# Patient Record
Sex: FEMALE | Race: BLACK OR AFRICAN AMERICAN | NOT HISPANIC OR LATINO | Employment: OTHER | URBAN - METROPOLITAN AREA
[De-identification: names, ages, dates, MRNs, and addresses within clinical notes are randomized per-mention and may not be internally consistent; named-entity substitution may affect disease eponyms.]

---

## 2017-02-14 ENCOUNTER — LAB VISIT (OUTPATIENT)
Dept: LAB | Facility: HOSPITAL | Age: 62
End: 2017-02-14
Attending: INTERNAL MEDICINE
Payer: MEDICARE

## 2017-02-14 ENCOUNTER — INITIAL CONSULT (OUTPATIENT)
Dept: HEMATOLOGY/ONCOLOGY | Facility: CLINIC | Age: 62
End: 2017-02-14
Payer: MEDICARE

## 2017-02-14 VITALS
OXYGEN SATURATION: 95 % | BODY MASS INDEX: 28.86 KG/M2 | HEIGHT: 64 IN | TEMPERATURE: 98 F | WEIGHT: 169.06 LBS | SYSTOLIC BLOOD PRESSURE: 118 MMHG | DIASTOLIC BLOOD PRESSURE: 76 MMHG | HEART RATE: 62 BPM

## 2017-02-14 DIAGNOSIS — R76.8 ELEVATED SERUM IMMUNOGLOBULIN FREE LIGHT CHAINS: Primary | ICD-10-CM

## 2017-02-14 DIAGNOSIS — R76.8 ELEVATED SERUM IMMUNOGLOBULIN FREE LIGHT CHAINS: ICD-10-CM

## 2017-02-14 LAB
ALBUMIN SERPL BCP-MCNC: 3.6 G/DL
ALP SERPL-CCNC: 117 U/L
ALT SERPL W/O P-5'-P-CCNC: 12 U/L
ANION GAP SERPL CALC-SCNC: 6 MMOL/L
AST SERPL-CCNC: 25 U/L
B2 MICROGLOB SERPL-MCNC: 1.8 UG/ML
BASOPHILS # BLD AUTO: 0.03 K/UL
BASOPHILS NFR BLD: 0.6 %
BILIRUB SERPL-MCNC: 0.3 MG/DL
BUN SERPL-MCNC: 12 MG/DL
CALCIUM SERPL-MCNC: 9.2 MG/DL
CHLORIDE SERPL-SCNC: 108 MMOL/L
CO2 SERPL-SCNC: 28 MMOL/L
CREAT SERPL-MCNC: 0.9 MG/DL
DIFFERENTIAL METHOD: NORMAL
EOSINOPHIL # BLD AUTO: 0.1 K/UL
EOSINOPHIL NFR BLD: 2.8 %
ERYTHROCYTE [DISTWIDTH] IN BLOOD BY AUTOMATED COUNT: 14 %
EST. GFR  (AFRICAN AMERICAN): >60 ML/MIN/1.73 M^2
EST. GFR  (NON AFRICAN AMERICAN): >60 ML/MIN/1.73 M^2
GLUCOSE SERPL-MCNC: 83 MG/DL
HCT VFR BLD AUTO: 40 %
HGB BLD-MCNC: 12.9 G/DL
IGA SERPL-MCNC: 352 MG/DL
IGG SERPL-MCNC: 1514 MG/DL
IGM SERPL-MCNC: 69 MG/DL
LDH SERPL L TO P-CCNC: 188 U/L
LYMPHOCYTES # BLD AUTO: 1.7 K/UL
LYMPHOCYTES NFR BLD: 34 %
MCH RBC QN AUTO: 28.2 PG
MCHC RBC AUTO-ENTMCNC: 32.3 %
MCV RBC AUTO: 88 FL
MONOCYTES # BLD AUTO: 0.6 K/UL
MONOCYTES NFR BLD: 11.8 %
NEUTROPHILS # BLD AUTO: 2.5 K/UL
NEUTROPHILS NFR BLD: 50.6 %
PLATELET # BLD AUTO: 280 K/UL
PMV BLD AUTO: 11.2 FL
POTASSIUM SERPL-SCNC: 3.9 MMOL/L
PROT SERPL-MCNC: 7.7 G/DL
RBC # BLD AUTO: 4.57 M/UL
SODIUM SERPL-SCNC: 142 MMOL/L
WBC # BLD AUTO: 5 K/UL

## 2017-02-14 PROCEDURE — 80053 COMPREHEN METABOLIC PANEL: CPT

## 2017-02-14 PROCEDURE — 82784 ASSAY IGA/IGD/IGG/IGM EACH: CPT | Mod: 59

## 2017-02-14 PROCEDURE — 84165 PROTEIN E-PHORESIS SERUM: CPT

## 2017-02-14 PROCEDURE — 83615 LACTATE (LD) (LDH) ENZYME: CPT

## 2017-02-14 PROCEDURE — 85025 COMPLETE CBC W/AUTO DIFF WBC: CPT

## 2017-02-14 PROCEDURE — 36415 COLL VENOUS BLD VENIPUNCTURE: CPT

## 2017-02-14 PROCEDURE — 84165 PROTEIN E-PHORESIS SERUM: CPT | Mod: 26,,, | Performed by: PATHOLOGY

## 2017-02-14 PROCEDURE — 82232 ASSAY OF BETA-2 PROTEIN: CPT

## 2017-02-14 PROCEDURE — 83520 IMMUNOASSAY QUANT NOS NONAB: CPT

## 2017-02-14 PROCEDURE — 99204 OFFICE O/P NEW MOD 45 MIN: CPT | Mod: S$GLB,,, | Performed by: INTERNAL MEDICINE

## 2017-02-14 PROCEDURE — 99999 PR PBB SHADOW E&M-EST. PATIENT-LVL III: CPT | Mod: PBBFAC,,, | Performed by: INTERNAL MEDICINE

## 2017-02-14 NOTE — LETTER
February 15, 2017      Ervin Carlson MD  [address]  [place] [address]           [facility]  [address] [place] [address]  Phone: [phone]          Patient: Bossman Kumari   MR Number: [id number]   YOB: 1955   Date of Visit: 2/14/2017       Dear Dr. Ervin Carlson:    Thank you for referring Bossman Kumari to me for evaluation. Attached you will find relevant portions of my assessment and plan of care.    If you have questions, please do not hesitate to call me. I look forward to following Bossman Kumari along with you.    Sincerely,    Colette Madsen MD    Enclosure  CC:  No Recipients    If you would like to receive this communication electronically, please contact [email or web address] or [phone] to request more information on [employer] Link access.    For providers and/or their staff who would like to refer a patient to [facility], please contact us through our one-stop-shop provider referral line, [facility] , at [phone].    If you feel you have received this communication in error or would no longer like to receive these types of communications, please e-mail [email or web address]

## 2017-02-14 NOTE — MR AVS SNAPSHOT
[facility]  [address] [facility] [address] [place] [address]  Phone: [phone]                  Bossman Kumari   2017 8:00 AM   Initial consult    Description:  Female : 1955   Provider:  Colette Madsen MD   [facility]  [facility]           Reason for Visit     Consult           Diagnoses this Visit        Comments    Elevated serum immunoglobulin free light chains    -  Primary            To Do List           Future Appointments        Provider Department Dept Phone    2017 10:25 AM LAB, [facility] [phone]    3/15/2017 9:00 AM Colette Madsen MD [facility] [phone]      Goals (5 Years of Data)     None      Follow-Up and Disposition     Return in about 1 month (around 3/14/2017).      [facility]     [facility] -  Assistance  Registered nurses in the [facility] provide clinical advisement, health education, appointment booking, and other advisory services.  Call for this free service at [phone].             Medications           Message regarding Medications     Verify the changes and/or additions to your medication regime listed below are the same as discussed with your clinician today.  If any of these changes or additions are incorrect, please notify your healthcare provider.             Verify that the below list of medications is an accurate representation of the medications you are currently taking.  If none reported, the list may be blank. If incorrect, please contact your healthcare provider. Carry this list with you in case of emergency.           Current Medications     albuterol (PROAIR HFA) 90 mcg/actuation inhaler Inhale 2 puffs into the lungs every 6 (six) hours as needed.    cyanocobalamin (VITAMIN B-12) 1000 MCG tablet 1 a day    etodolac (LODINE) 400 MG tablet 1 twice a day if needed. Take with food.  Stop if upsets stomach  "   fluticasone (FLONASE) 50 mcg/actuation nasal spray 1 spray by Each Nare route once daily.    methocarbamol (ROBAXIN) 500 MG Tab 1 qid, prn neck pain    montelukast (SINGULAIR) 10 mg tablet Take 1 tablet (10 mg total) by mouth every evening.    pantoprazole (PROTONIX) 20 MG tablet Take 1 tablet (20 mg total) by mouth once daily.    tramadol (ULTRAM) 50 mg tablet 1 tid, prn, pain    trazodone (DESYREL) 100 MG tablet TK 1 TO 1 AND 1/2 TS PO QD AFTER MEALS AND HS PRN           Clinical Reference Information           Your Vitals Were     BP Pulse Temp Height Weight SpO2    118/76 (BP Location: Right arm, Patient Position: Sitting, BP Method: Manual) 62 98 °F (36.7 °C) (Oral) 5' 4" (1.626 m) 76.7 kg (169 lb 1.5 oz) 95%    BMI                29.02 kg/m2          Blood Pressure          Most Recent Value    BP  118/76      Allergies as of 2/14/2017     No Known Allergies      Immunizations Administered on Date of Encounter - 2/14/2017     None      Orders Placed During Today's Visit     Future Labs/Procedures Expected by Expires    Beta 2 Microglobulin, Serum  2/14/2017 2/14/2018    CBC auto differential  2/14/2017 2/14/2018    Comprehensive metabolic panel  2/14/2017 2/14/2018    Immunoglobulin free LT chains blood  2/14/2017 2/14/2018    Immunoglobulins (IgG, IgA, IgM) Quantitative  2/14/2017 2/14/2018    Lactate dehydrogenase  2/14/2017 2/14/2018    Protein electrophoresis, serum  2/14/2017 2/14/2018    Immunofixation, 24 hour Urine  As directed 2/14/2018    Protein electrophoresis, urine  As directed 2/15/2018      Language Assistance Services     ATTENTION: Language assistance services are available, free of charge. Please call [phone].      ATENCIÓN: Si archiela giovani, tiene a pa disposición servicios gratuitos de asistencia lingüística. Llame al [phone].     CHÚ Ý: N?u b?n nói Ti?ng Vi?t, có các d?ch v? h? tr? ngôn ng? mi?n phí dành cho b?n. G?i s? [phone].         [facility] complies with applicable Federal civil rights laws and does not discriminate on the basis of race, color, national origin, age, disability, or sex.

## 2017-02-14 NOTE — PROGRESS NOTES
Subjective:       Patient ID: Bossman Kumari is a 61 y.o. female.    Chief Complaint: Consult (Abnormal lab results)    HPI   PT ARRIVED 30 MIN LATE FOR VISIT AND STILL SEEN    REASON FOR REFERRAL : ABNORMAL LABORATORY TEST  REFERRING PHYSICIAN: JONELLE CARRILLO MD    HISTORY OF PRESENT ILLNESS:  The patient arrived 30 minutes late for visit and still seen.  The patient is a 61-year-old female with past medical history of GERD, osteoarthritis and  asthma seen today for abnormal laboratory testing.The patient frequently answering cell phone calls during visit.  She has chronic low back pain related to degenerative joint disease of the lumbar spine.  She also has chronic left foot pain related to plantar fasciitis.  She reports intermittent tingling in her feet for which she underwent further evaluation including SPEP testing, which was normal.  A kappa and free light chain level mildly elevated at 3.28 mg/dL with normal lambda free light chains of 1.99 mg/dL with a normal kappa/lambda free light chain ratio of 1.65. No history of anemia. Family history remarkable  for mother diagnosed with liver cancer.  No family history of amyloidosis.  No history of CKD.Her appetite and weight are stable.  No fevers or night sweats.  She is here for further evaluation.    PAST MEDICAL HISTORY:  Cognitive impairment, asthma, chronic, uncomplicated, DDD lumbar, plantar fasciitis, chronic back pain.    PAST SURGICAL HISTORY:  Total abdominal hysterectomy.        FAMILY HISTORY ; MOM  LIVER CANCER AT 83 Y/O. FATHER LIVING HAS HTN, DM.  6 SISTERS, 2 BROTHERS,     SOCIAL HISTORY ; NO TOBACCO . NO ETOH. . 4 CHILDREN. RETIRED AT [employer]  AS [employer] . . POSSIBLE ASBESTOS EXPOSURE    Review of Systems   Constitutional: Negative for appetite change, fatigue, fever and unexpected weight change.   HENT: Negative for mouth sores and nosebleeds.    Eyes: Negative for visual disturbance.   Respiratory:  "Negative for cough and shortness of breath.    Cardiovascular: Negative for chest pain and leg swelling.   Gastrointestinal: Negative for abdominal pain, blood in stool, constipation, diarrhea, nausea and vomiting.   Genitourinary: Negative for frequency and hematuria.   Musculoskeletal: Positive for arthralgias and back pain.   Skin: Negative for rash.   Neurological: Negative for dizziness, seizures, weakness, light-headedness, numbness and headaches.   Hematological: Negative for adenopathy.   Psychiatric/Behavioral: The patient is not nervous/anxious.        Objective:       Vitals:    02/14/17 0842   BP: 118/76   BP Location: Right arm   Patient Position: Sitting   BP Method: Manual   Pulse: 62   Temp: 98 °F (36.7 °C)   TempSrc: Oral   SpO2: 95%   Weight: 76.7 kg (169 lb 1.5 oz)   Height: 5' 4" (1.626 m)       Physical Exam   Constitutional: She is oriented to person, place, and time. She appears well-developed and well-nourished.   HENT:   Head: Normocephalic.   Mouth/Throat: Oropharynx is clear and moist. No oropharyngeal exudate.   Eyes: Conjunctivae and lids are normal. Pupils are equal, round, and reactive to light. No scleral icterus.   Neck: Normal range of motion. Neck supple. No thyromegaly present.   Cardiovascular: Normal rate, regular rhythm and normal heart sounds.    No murmur heard.  Pulmonary/Chest: Breath sounds normal. She has no wheezes. She has no rales.   Abdominal: Soft. Bowel sounds are normal. She exhibits no distension and no mass. There is no hepatosplenomegaly. There is no tenderness. There is no rebound and no guarding.   Musculoskeletal: Normal range of motion. She exhibits no edema or tenderness.   Lymphadenopathy:     She has no cervical adenopathy.     She has no axillary adenopathy.        Right: No supraclavicular adenopathy present.        Left: No supraclavicular adenopathy present.   Neurological: She is alert and oriented to person, place, and time. No cranial nerve " deficit. Coordination normal.   Skin: Skin is warm and dry. No ecchymosis, no petechiae and no rash noted. No erythema.   Psychiatric: She has a normal mood and affect.           Results for VANDANA MATTHEWS (MRN [id number]) as of 2/14/2017 09:04   Ref. Range 10/21/2016 11:30   [place] Free Light Chains Latest Ref Range: 0.33 - 1.94 mg/dL 3.28 (H)   Lambda Free Light Chains Latest Ref Range: 0.57 - 2.63 mg/dL 1.99   Kappa/Lambda FLC Ratio Latest Ref Range: 0.26 - 1.65  1.65     SPEP-Normal total protein.   No paraprotein bands are detected    Lab Results   Component Value Date    WBC 5.00 02/14/2017    HGB 12.9 02/14/2017    HCT 40.0 02/14/2017    MCV 88 02/14/2017     02/14/2017     Results for VANADNA MATTHEWS (MRN [id number]) as of 2/14/2017 09:18   Ref. Range 10/21/2016 11:30   Sodium Latest Ref Range: 136 - 145 mmol/L 142   Potassium Latest Ref Range: 3.5 - 5.1 mmol/L 3.6   Chloride Latest Ref Range: 95 - 110 mmol/L 108   CO2 Latest Ref Range: 23 - 29 mmol/L 26   Anion Gap Latest Ref Range: 8 - 16 mmol/L 8   BUN, Bld Latest Ref Range: 6 - 20 mg/dL 15   Creatinine Latest Ref Range: 0.5 - 1.4 mg/dL 0.9   eGFR if non African American Latest Ref Range: >60 mL/min/1.73 m^2 >60.0   eGFR if African American Latest Ref Range: >60 mL/min/1.73 m^2 >60.0   Glucose Latest Ref Range: 70 - 110 mg/dL 52 (L)   Calcium Latest Ref Range: 8.7 - 10.5 mg/dL 9.7   Alkaline Phosphatase Latest Ref Range: 55 - 135 U/L 145 (H)   Total Protein Latest Ref Range: 6.0 - 8.4 g/dL 8.0   Protein, Serum Latest Ref Range: 6.0 - 8.4 g/dL 7.4   Albumin Latest Ref Range: 3.5 - 5.2 g/dL 3.8   Total Bilirubin Latest Ref Range: 0.1 - 1.0 mg/dL 0.3   AST Latest Ref Range: 10 - 40 U/L 26   ALT Latest Ref Range: 10 - 44 U/L 22     MRI Lumbar Spine 5/29/2015   1. No evidence of a compression fracture involving the lumbar spine.    #2.  Multilevel degenerative change of the lumbar spine most significant at L4-L5 and L5-S1 as detailed above.    #3.   Probable cholelithiasis.    Assessment:       1. Elevated serum immunoglobulin free light chains        Plan:    In summary, the patient is a 61-year-old with a history of GERD and DDD with MLUS or  monoclonal light chain of undetermined significance.  Renal function is within normal.  CBC reveals normal blood counts.  The patient does have some minor tingling in her feet. Plan  testing today including SPEP, free light chains, quantitative immunoglobulins, beta-2 microglobulin and urine studies.  She will follow up in 1 month to review the results of the workup.  All questions posed were answered to the patient's satisfaction.      Thank you for allowing me to participate in the care of your patient.     CC: Ervin Carlson M.D.

## 2017-02-15 LAB
ALBUMIN SERPL ELPH-MCNC: 3.93 G/DL
ALPHA1 GLOB SERPL ELPH-MCNC: 0.32 G/DL
ALPHA2 GLOB SERPL ELPH-MCNC: 0.79 G/DL
B-GLOBULIN SERPL ELPH-MCNC: 1.13 G/DL
GAMMA GLOB SERPL ELPH-MCNC: 1.52 G/DL
KAPPA LC SER QL IA: 2.97 MG/DL
KAPPA LC/LAMBDA SER IA: 1.19
LAMBDA LC SER QL IA: 2.49 MG/DL
PROT SERPL-MCNC: 7.7 G/DL

## 2017-02-16 DIAGNOSIS — Z12.31 ENCOUNTER FOR SCREENING MAMMOGRAM FOR BREAST CANCER: Primary | ICD-10-CM

## 2017-02-17 DIAGNOSIS — J45.30 ASTHMA, CHRONIC, MILD PERSISTENT, UNCOMPLICATED: ICD-10-CM

## 2017-02-17 LAB — PATHOLOGIST INTERPRETATION SPE: NORMAL

## 2017-02-17 RX ORDER — ALBUTEROL SULFATE 90 UG/1
2 AEROSOL, METERED RESPIRATORY (INHALATION) EVERY 6 HOURS PRN
Qty: 3 INHALER | Refills: 5 | Status: SHIPPED | OUTPATIENT
Start: 2017-02-17 | End: 2017-04-06 | Stop reason: SDUPTHER

## 2017-02-17 NOTE — TELEPHONE ENCOUNTER
----- Message from Kashmri Austin sent at 2/17/2017  1:50 PM CST -----  Contact: Self  REFILL: albuterol (PROAIR HFA) 90 mcg/actuation inhaler

## 2017-03-15 ENCOUNTER — OFFICE VISIT (OUTPATIENT)
Dept: HEMATOLOGY/ONCOLOGY | Facility: CLINIC | Age: 62
End: 2017-03-15
Payer: MEDICARE

## 2017-03-15 ENCOUNTER — HOSPITAL ENCOUNTER (EMERGENCY)
Facility: HOSPITAL | Age: 62
Discharge: HOME | End: 2017-03-15
Attending: EMERGENCY MEDICINE
Payer: MEDICARE

## 2017-03-15 VITALS
DIASTOLIC BLOOD PRESSURE: 72 MMHG | HEIGHT: 64 IN | OXYGEN SATURATION: 98 % | HEART RATE: 81 BPM | RESPIRATION RATE: 18 BRPM | WEIGHT: 169 LBS | SYSTOLIC BLOOD PRESSURE: 113 MMHG | TEMPERATURE: 98 F | BODY MASS INDEX: 28.85 KG/M2

## 2017-03-15 VITALS
HEART RATE: 88 BPM | HEIGHT: 64 IN | OXYGEN SATURATION: 99 % | BODY MASS INDEX: 29.2 KG/M2 | WEIGHT: 171.06 LBS | TEMPERATURE: 98 F | DIASTOLIC BLOOD PRESSURE: 64 MMHG | SYSTOLIC BLOOD PRESSURE: 118 MMHG

## 2017-03-15 DIAGNOSIS — R76.8 ELEVATED SERUM IMMUNOGLOBULIN FREE LIGHT CHAINS: Primary | ICD-10-CM

## 2017-03-15 DIAGNOSIS — R53.1 WEAKNESS GENERALIZED: Primary | ICD-10-CM

## 2017-03-15 DIAGNOSIS — R42 DIZZINESS: ICD-10-CM

## 2017-03-15 DIAGNOSIS — R53.1 WEAKNESS: ICD-10-CM

## 2017-03-15 LAB
ALBUMIN SERPL BCP-MCNC: 3.6 G/DL
ALP SERPL-CCNC: 119 U/L
ALT SERPL W/O P-5'-P-CCNC: 31 U/L
ANION GAP SERPL CALC-SCNC: 8 MMOL/L
AST SERPL-CCNC: 36 U/L
BASOPHILS # BLD AUTO: 0.04 K/UL
BASOPHILS NFR BLD: 0.8 %
BILIRUB SERPL-MCNC: 0.2 MG/DL
BILIRUB UR QL STRIP: NEGATIVE
BUN SERPL-MCNC: 13 MG/DL
CALCIUM SERPL-MCNC: 9.6 MG/DL
CHLORIDE SERPL-SCNC: 111 MMOL/L
CLARITY UR: CLEAR
CO2 SERPL-SCNC: 24 MMOL/L
COLOR UR: YELLOW
CREAT SERPL-MCNC: 0.9 MG/DL
DIFFERENTIAL METHOD: NORMAL
EOSINOPHIL # BLD AUTO: 0.3 K/UL
EOSINOPHIL NFR BLD: 5.1 %
ERYTHROCYTE [DISTWIDTH] IN BLOOD BY AUTOMATED COUNT: 14.2 %
EST. GFR  (AFRICAN AMERICAN): >60 ML/MIN/1.73 M^2
EST. GFR  (NON AFRICAN AMERICAN): >60 ML/MIN/1.73 M^2
GLUCOSE SERPL-MCNC: 66 MG/DL
GLUCOSE UR QL STRIP: NEGATIVE
HCT VFR BLD AUTO: 38.1 %
HGB BLD-MCNC: 12.4 G/DL
HGB UR QL STRIP: NEGATIVE
KETONES UR QL STRIP: NEGATIVE
LEUKOCYTE ESTERASE UR QL STRIP: ABNORMAL
LYMPHOCYTES # BLD AUTO: 1.8 K/UL
LYMPHOCYTES NFR BLD: 36.9 %
MAGNESIUM SERPL-MCNC: 2.2 MG/DL
MCH RBC QN AUTO: 28.6 PG
MCHC RBC AUTO-ENTMCNC: 32.5 %
MCV RBC AUTO: 88 FL
MICROSCOPIC COMMENT: NORMAL
MONOCYTES # BLD AUTO: 0.7 K/UL
MONOCYTES NFR BLD: 13.8 %
NEUTROPHILS # BLD AUTO: 2.1 K/UL
NEUTROPHILS NFR BLD: 43.2 %
NITRITE UR QL STRIP: NEGATIVE
PH UR STRIP: 6 [PH] (ref 5–8)
PLATELET # BLD AUTO: 265 K/UL
PMV BLD AUTO: 12.1 FL
POCT GLUCOSE: 101 MG/DL (ref 70–110)
POTASSIUM SERPL-SCNC: 4.1 MMOL/L
PROT SERPL-MCNC: 7.7 G/DL
PROT UR QL STRIP: NEGATIVE
RBC # BLD AUTO: 4.34 M/UL
SODIUM SERPL-SCNC: 143 MMOL/L
SP GR UR STRIP: 1.01 (ref 1–1.03)
T4 FREE SERPL-MCNC: 0.81 NG/DL
TSH SERPL DL<=0.005 MIU/L-ACNC: 0.07 UIU/ML
URN SPEC COLLECT METH UR: ABNORMAL
UROBILINOGEN UR STRIP-ACNC: NEGATIVE EU/DL
WBC # BLD AUTO: 4.91 K/UL
WBC #/AREA URNS HPF: 2 /HPF (ref 0–5)

## 2017-03-15 PROCEDURE — 80053 COMPREHEN METABOLIC PANEL: CPT

## 2017-03-15 PROCEDURE — 93005 ELECTROCARDIOGRAM TRACING: CPT

## 2017-03-15 PROCEDURE — 82962 GLUCOSE BLOOD TEST: CPT

## 2017-03-15 PROCEDURE — 96361 HYDRATE IV INFUSION ADD-ON: CPT

## 2017-03-15 PROCEDURE — 84443 ASSAY THYROID STIM HORMONE: CPT

## 2017-03-15 PROCEDURE — 99213 OFFICE O/P EST LOW 20 MIN: CPT | Mod: S$GLB,,, | Performed by: INTERNAL MEDICINE

## 2017-03-15 PROCEDURE — 81000 URINALYSIS NONAUTO W/SCOPE: CPT

## 2017-03-15 PROCEDURE — 25000003 PHARM REV CODE 250: Performed by: EMERGENCY MEDICINE

## 2017-03-15 PROCEDURE — 1160F RVW MEDS BY RX/DR IN RCRD: CPT | Mod: S$GLB,,, | Performed by: INTERNAL MEDICINE

## 2017-03-15 PROCEDURE — 99284 EMERGENCY DEPT VISIT MOD MDM: CPT | Mod: 25

## 2017-03-15 PROCEDURE — 84439 ASSAY OF FREE THYROXINE: CPT

## 2017-03-15 PROCEDURE — 96360 HYDRATION IV INFUSION INIT: CPT

## 2017-03-15 PROCEDURE — 83735 ASSAY OF MAGNESIUM: CPT

## 2017-03-15 PROCEDURE — 85025 COMPLETE CBC W/AUTO DIFF WBC: CPT

## 2017-03-15 PROCEDURE — 99999 PR PBB SHADOW E&M-EST. PATIENT-LVL III: CPT | Mod: PBBFAC,,, | Performed by: INTERNAL MEDICINE

## 2017-03-15 RX ADMIN — SODIUM CHLORIDE 1000 ML: 0.9 INJECTION, SOLUTION INTRAVENOUS at 12:03

## 2017-03-15 NOTE — DISCHARGE INSTRUCTIONS
Please return immediately if you get worse or if new problems develop.  Please make an appointment to see your primary care doctor this week.  Rest.  Lots of liquids.  Small frequent meals.  Rest.

## 2017-03-15 NOTE — PROGRESS NOTES
"Subjective:       Patient ID: Bossman Kumari is a 61 y.o. female.    Chief Complaint: Follow-up    HPI       Follow-up for: elevated serum FLC      HISTORY OF PRESENT ILLNESS: The patient is a 61-year-old female with past medical history of GERD, osteoarthritis and  asthma seen today for f/u abnormal laboratory testing.T She has chronic low back pain related to degenerative joint disease of the lumbar spine.  She also has chronic left foot pain related to plantar fasciitis.  She reports intermittent tingling in her feet for which she underwent further evaluation including SPEP testing, which was normal.  A kappa and free light chain level mildly elevated at 3.28 mg/dL with normal lambda free light chains of 1.99 mg/dL with a normal kappa/lambda free light chain ratio of 1.65. No history of anemia. Family history remarkable  for mother diagnosed with liver cancer.  No family history of amyloidosis.  No history of CKD.      Today, she reports she "feels bad"  She reports she has dizziness and generalized weakness 2 days ago  She reports she " can't think"   No syncopal episode  Her appetite and weight are stable.    No fevers or night sweats.    No SOB/CP/N/V  No diarrhea or rashes  No pain     Pt again answering phone calls during visit  Pt reports " son in [facility]"     She is process of establishing care with new PCP    She did not undergo follow-up blood work or urine studies  She did not undergo surveillance Mammo       PAST MEDICAL HISTORY:  Cognitive impairment, asthma, chronic, uncomplicated, DDD lumbar, plantar fasciitis, chronic back pain.    PAST SURGICAL HISTORY:  Total abdominal hysterectomy.        FAMILY HISTORY ; MOM  LIVER CANCER AT 85 Y/O. FATHER LIVING HAS HTN, DM.  6 SISTERS, 2 BROTHERS,     SOCIAL HISTORY ; NO TOBACCO . NO ETOH. . 4 CHILDREN. RETIRED AT [employer]  AS [employer] . . POSSIBLE ASBESTOS EXPOSURE    Review of Systems   Constitutional: Positive for fatigue. " "Negative for appetite change, fever and unexpected weight change.   HENT: Negative for mouth sores and nosebleeds.    Eyes: Negative for visual disturbance.   Respiratory: Negative for cough and shortness of breath.    Cardiovascular: Negative for chest pain and leg swelling.   Gastrointestinal: Negative for abdominal pain, blood in stool, constipation, diarrhea, nausea and vomiting.   Genitourinary: Negative for frequency and hematuria.   Musculoskeletal: Positive for arthralgias and back pain.   Skin: Negative for rash.   Neurological: Positive for dizziness and weakness. Negative for seizures, light-headedness, numbness and headaches.   Hematological: Negative for adenopathy.   Psychiatric/Behavioral: The patient is not nervous/anxious.        Objective:       Vitals:    03/15/17 0912   BP: 118/64   BP Location: Right arm   Patient Position: Sitting   BP Method: Manual   Pulse: 88   Temp: 97.8 °F (36.6 °C)   TempSrc: Oral   SpO2: 99%   Weight: 77.6 kg (171 lb 1.2 oz)   Height: 5' 4" (1.626 m)       Physical Exam   Constitutional: She is oriented to person, place, and time. She appears well-developed and well-nourished.   HENT:   Head: Normocephalic.   Mouth/Throat: Oropharynx is clear and moist. No oropharyngeal exudate.   Eyes: Conjunctivae and lids are normal. Pupils are equal, round, and reactive to light. No scleral icterus.   Neck: Normal range of motion. Neck supple. No thyromegaly present.   Cardiovascular: Normal rate, regular rhythm and normal heart sounds.    No murmur heard.  Pulmonary/Chest: Breath sounds normal. She has no wheezes. She has no rales.   Abdominal: Soft. Bowel sounds are normal. She exhibits no distension and no mass. There is no hepatosplenomegaly. There is no tenderness. There is no rebound and no guarding.   Musculoskeletal: Normal range of motion. She exhibits no edema or tenderness.   Lymphadenopathy:     She has no cervical adenopathy.     She has no axillary adenopathy.        " Right: No supraclavicular adenopathy present.        Left: No supraclavicular adenopathy present.   Neurological: She is alert and oriented to person, place, and time. No cranial nerve deficit. Coordination normal.   Skin: Skin is warm and dry. No ecchymosis, no petechiae and no rash noted. No erythema.   Psychiatric: She has a normal mood and affect.         Results for VANDANA MATTHEWS (MRN [id number]) as of 3/15/2017 09:26   Ref. Range 10/21/2016 11:30 2/14/2017 10:07   Protein, Serum Latest Ref Range: 6.0 - 8.4 g/dL 7.4 7.7   Albumin grams/dl Latest Ref Range: 3.35 - 5.55 g/dL 3.74 3.93   Alpha-1 grams/dl Latest Ref Range: 0.17 - 0.41 g/dL 0.32 0.32   Alpha-2 grams/dl Latest Ref Range: 0.43 - 0.99 g/dL 0.71 0.79   Beta grams/dl Latest Ref Range: 0.50 - 1.10 g/dL 1.17 (H) 1.13 (H)   Gamma grams/dl Latest Ref Range: 0.67 - 1.58 g/dL 1.46 1.52   Pathologist Interpretation SPE Unknown REVIEWED REVIEWED   [place] Free Light Chains Latest Ref Range: 0.33 - 1.94 mg/dL 3.28 (H) 2.97 (H)   Lambda Free Light Chains Latest Ref Range: 0.57 - 2.63 mg/dL 1.99 2.49   Kappa/Lambda FLC Ratio Latest Ref Range: 0.26 - 1.65  1.65 1.19         SPEP-Normal total protein.   No paraprotein bands are detected    Lab Results   Component Value Date    WBC 5.00 02/14/2017    HGB 12.9 02/14/2017    HCT 40.0 02/14/2017    MCV 88 02/14/2017     02/14/2017         MRI Lumbar Spine 5/29/2015   1. No evidence of a compression fracture involving the lumbar spine.    #2.  Multilevel degenerative change of the lumbar spine most significant at L4-L5 and L5-S1 as detailed above.    #3.  Probable cholelithiasis.    Assessment:       1. Elevated serum immunoglobulin free light chains    2. Dizziness    3. Weakness        Plan:   61-year-old with a history of GERD and DDD with MLUS or  monoclonal light chain of undetermined significance.   No labs done   Urine studies not done  Referral to ED   F/u 6 mos with cbc,cmp, SPEP, FLC prior to f/u      CC:  Ervin Carlson M.D.

## 2017-03-15 NOTE — ED NOTES
Dr. Melendez notified of patients blood glucose level 66, per Dr. Melendez provide pt with 4oz juice and meal tray. Juice and food were given to patient. Will reassess blood glucose.

## 2017-03-15 NOTE — ED TRIAGE NOTES
Pt arrives to ED via transportation with c/o generalized weakness, fatigue, dizziness and nausea x couple days. Denies chest pain, SOB, or any other symptoms at this time.

## 2017-03-15 NOTE — ED AVS SNAPSHOT
[facility]  [address] [place] [address]               Bossman Kumari   3/15/2017 10:27 AM   ED    Description:  Female : 1955   Department:  [facility]           Your Care was Coordinated By:     Provider Role From To    Julio C Melendez MD Attending Provider 03/15/17 1029 --      Reason for Visit     General Weakness           Diagnoses this Visit        Comments    Weakness generalized    -  Primary     Dizziness           ED Disposition     None           To Do List           Follow-up Information     Follow up with Paula Martinez MD In 3 days.    Specialty:  Family Medicine    Contact information:    [address] [place] [address]  [phone]        [facility]     [facility] Care [facility] -  Assistance  Registered nurses in the [facility] provide clinical advisement, health education, appointment booking, and other advisory services.  Call for this free service at [phone].             Medications           Message regarding Medications     Verify the changes and/or additions to your medication regime listed below are the same as discussed with your clinician today.  If any of these changes or additions are incorrect, please notify your healthcare provider.        These medications were administered today        Dose Freq    sodium chloride 0.9% bolus 1,000 mL 1,000 mL ED 1 Time    Sig: Inject 1,000 mLs into the vein ED 1 Time.    Class: Normal    Route: Intravenous           Verify that the below list of medications is an accurate representation of the medications you are currently taking.  If none reported, the list may be blank. If incorrect, please contact your healthcare provider. Carry this list with you in case of emergency.           Current Medications     albuterol (PROAIR HFA) 90 mcg/actuation inhaler Inhale 2 puffs into the lungs every 6 (six) hours as needed.    cyanocobalamin  "(VITAMIN B-12) 1000 MCG tablet 1 a day    etodolac (LODINE) 400 MG tablet 1 twice a day if needed. Take with food.  Stop if upsets stomach    fluticasone (FLONASE) 50 mcg/actuation nasal spray 1 spray by Each Nare route once daily.    methocarbamol (ROBAXIN) 500 MG Tab 1 qid, prn neck pain    montelukast (SINGULAIR) 10 mg tablet Take 1 tablet (10 mg total) by mouth every evening.    pantoprazole (PROTONIX) 20 MG tablet Take 1 tablet (20 mg total) by mouth once daily.    tramadol (ULTRAM) 50 mg tablet 1 tid, prn, pain    trazodone (DESYREL) 100 MG tablet TK 1 TO 1 AND 1/2 TS PO QD AFTER MEALS AND HS PRN           Clinical Reference Information           Your Vitals Were     BP Pulse Temp Resp Height Weight    136/72 78 97.7 °F (36.5 °C) (Oral) 18 5' 4" (1.626 m) 76.7 kg (169 lb)    SpO2 BMI             100% 29.01 kg/m2         Allergies as of 3/15/2017     No Known Allergies      Immunizations Administered on Date of Encounter - 3/15/2017     None      ED Micro, Lab, POCT     Start Ordered       Status Ordering Provider    03/15/17 1324 03/15/17 1324  POCT glucose  Once      Final result     03/15/17 1217 03/15/17 1216  Comprehensive metabolic panel  STAT      Final result     03/15/17 1217 03/15/17 1216  CBC auto differential  STAT      Final result     03/15/17 1217 03/15/17 1216  Magnesium  STAT      Final result     03/15/17 1217 03/15/17 1216  TSH  STAT      Final result     03/15/17 1217 03/15/17 1216  Urinalysis  STAT      Final result     03/15/17 1216 03/15/17 1216  Urinalysis Microscopic  Once      Final result     03/15/17 1216 03/15/17 1216  T4, free  Once      Final result       ED Imaging Orders     None        Discharge Instructions       Please return immediately if you get worse or if new problems develop.  Please make an appointment to see your primary care doctor this week.  Rest.  Lots of liquids.  Small frequent meals.  Rest.     [facility] complies with applicable Federal " civil rights laws and does not discriminate on the basis of race, color, national origin, age, disability, or sex.        Language Assistance Services     ATTENTION: Language assistance services are available, free of charge. Please call [phone].      ATENCIÓN: Si habla giovani, tiene a pa disposición servicios gratuitos de asistencia lingüística. Llame al [phone].     CHÚ Ý: N?u b?n nói Ti?ng Vi?t, có các d?ch v? h? tr? ngôn ng? mi?n phí dành cho b?n. G?i s? [phone].

## 2017-03-15 NOTE — ED PROVIDER NOTES
"Encounter Date: 3/15/2017    SCRIBE #1 NOTE: I, Jose Julio, am scribing for, and in the presence of, Julio C Melendez MD. Other sections scribed: HPI, ROS.       History     Chief Complaint   Patient presents with    General Weakness     pta was at Dr. Perkins's office and started feeling dizzy; states she has been feeling nauseated and felt generally weak x 2 days; denies dizziness at the moment     Review of patient's allergies indicates:  No Known Allergies  HPI Comments: CC: Weakness, Dizziness  HPI: This 61 y.o. female with asthma, GERD, DDD, chronic bronchitis and Hx of TOBIN, cholecystectomy presents to the ED c/o generalized weakness, fatigue, nausea, subjective fevers, increased appetite, and episodes of dizziness for the past 3 days. Pt describes the dizziness as feeling "like I'm drunk". Pt states that she became SOB and diaphoretic during an episode of dizziness last night; she denies any episodes of dizziness since then. Pt states that she has been hydrating more than usual since onset, and it seems to have helped her dizziness. Pt also reports "a weird feeling inside, maybe it's like a nerve thing". She reports stinging pain in her feet 2 nights ago. Pt reports moderate bilateral eye pain yesterday. Sx are acute. Pt states that she was seen by Dr. Madsen for appointment this morning, and was referred here for evaluation. She denies any V/D, cough, abdominal pain, chest pain, HA, syncope.        The history is provided by the patient.     Past Medical History:   Diagnosis Date    Acute cholecystitis 12/10/2015    Cholelithiasis 12/10/2015    DDD (degenerative disc disease), lumbar     Family history of diabetes mellitus type II 11/11/2015    GERD (gastroesophageal reflux disease)     Unspecified asthma, with exacerbation     Vaginal delivery     x4     Past Surgical History:   Procedure Laterality Date    TOTAL ABDOMINAL HYSTERECTOMY       Family History   Problem Relation Age of Onset    " Hypertension Mother     Diabetes Mother     Hypertension Father      Social History   Substance Use Topics    Smoking status: Never Smoker    Smokeless tobacco: Never Used    Alcohol use No     Review of Systems   Constitutional: Positive for appetite change (increased), diaphoresis (brief episode last night, resolved), fatigue and fever (subjective).   HENT: Negative for ear pain, rhinorrhea and sore throat.    Eyes: Positive for pain (bilateral 2 days ago, resolved). Negative for visual disturbance.   Respiratory: Positive for shortness of breath (brief episode last night, resolved). Negative for cough.    Cardiovascular: Negative for chest pain.   Gastrointestinal: Positive for nausea. Negative for abdominal pain, diarrhea and vomiting.   Genitourinary: Negative for dysuria and flank pain.   Musculoskeletal: Negative for arthralgias, back pain, myalgias and neck pain.        (+) bilateral foot pain 2 days ago, resolved   Skin: Negative for rash and wound.   Neurological: Positive for weakness (generalized). Negative for syncope and headaches.       Physical Exam   Initial Vitals   BP Pulse Resp Temp SpO2   03/15/17 1011 03/15/17 1011 03/15/17 1011 03/15/17 1011 03/15/17 1011   105/61 71 20 97.7 °F (36.5 °C) 99 %     Physical Exam  The patient was examined specifically for the following:   General:No significant distress, Good color, Warm and dry. Head and neck:Scalp atraumatic, Neck supple. Neurological:Appropriate conversation, Gross motor deficits. Eyes:Conjugate gaze, Clear corneas. ENT: No epistaxis. Cardiac: Regular rate and rhythm, Grossly normal heart tones. Pulmonary: Wheezing, Rales. Gastrointestinal: Abdominal tenderness, Abdominal distention. Musculoskeletal: Extremity deformity, Apparent pain with range of motion of the joints. Skin: Rash.   The findings on examination were normal .  Mental status examination, cranial nerves, motor and sensory examination are normal.  The extremities are  nontender.  There is no pain with range of motion of any joints.  The patient has no rash.   ED Course   Procedures  Labs Reviewed   COMPREHENSIVE METABOLIC PANEL - Abnormal; Notable for the following:        Result Value    Chloride 111 (*)     Glucose 66 (*)     All other components within normal limits   TSH - Abnormal; Notable for the following:     TSH 0.075 (*)     All other components within normal limits   URINALYSIS - Abnormal; Notable for the following:     Leukocytes, UA Trace (*)     All other components within normal limits   CBC W/ AUTO DIFFERENTIAL   MAGNESIUM   URINALYSIS MICROSCOPIC   T4, FREE   POCT GLUCOSE     EKG Readings: (Independently Interpreted)   Patient's EKG reveals a sinus rhythm with a first-degree AV block.  There are no significant ST segment or T-wave changes.  There is no evidence of acute myocardial infarction or malignant arrhythmia.     Medical decision making: Given the above, this patient presents to emergency room with weakness and dizziness.  Laboratory evaluation is essentially unremarkable except for low TSH.  Her blood pressures 105/61.  I can find no significant laboratory abnormalities to explain the patient's symptoms.  She does have borderline low glucose.  I will advise regular small meals.  I will have the patient return if she gets worse or if new problems develop.  She also is a borderline low blood pressure.  I will advise lots of liquids.  I will have her return if she gets worse or if new problems develop.                  Scribe Attestation:   Scribe #1: I performed the above scribed service and the documentation accurately describes the services I performed. I attest to the accuracy of the note.    Attending Attestation:           Physician Attestation for Scribe:  Physician Attestation Statement for Scribe #1: I, Julio C Melendez MD, reviewed documentation, as scribed by Jose Kim in my presence, and it is both accurate and complete.                 ED  Course     Clinical Impression:   The primary encounter diagnosis was Weakness generalized. A diagnosis of Dizziness was also pertinent to this visit.          Julio C Melendez MD  03/16/17 4811

## 2017-04-06 ENCOUNTER — OFFICE VISIT (OUTPATIENT)
Dept: FAMILY MEDICINE | Facility: CLINIC | Age: 62
End: 2017-04-06
Payer: MEDICARE

## 2017-04-06 VITALS
SYSTOLIC BLOOD PRESSURE: 100 MMHG | HEART RATE: 94 BPM | BODY MASS INDEX: 28.79 KG/M2 | TEMPERATURE: 98 F | WEIGHT: 168.63 LBS | DIASTOLIC BLOOD PRESSURE: 70 MMHG | HEIGHT: 64 IN | OXYGEN SATURATION: 93 %

## 2017-04-06 DIAGNOSIS — Z00.00 ANNUAL PHYSICAL EXAM: Primary | ICD-10-CM

## 2017-04-06 DIAGNOSIS — K21.9 GASTROESOPHAGEAL REFLUX DISEASE, ESOPHAGITIS PRESENCE NOT SPECIFIED: ICD-10-CM

## 2017-04-06 DIAGNOSIS — J45.30 ASTHMA, CHRONIC, MILD PERSISTENT, UNCOMPLICATED: ICD-10-CM

## 2017-04-06 DIAGNOSIS — M54.50 CHRONIC MIDLINE LOW BACK PAIN WITHOUT SCIATICA: ICD-10-CM

## 2017-04-06 DIAGNOSIS — G89.29 CHRONIC MIDLINE LOW BACK PAIN WITHOUT SCIATICA: ICD-10-CM

## 2017-04-06 DIAGNOSIS — J30.9 ALLERGIC RHINITIS, UNSPECIFIED ALLERGIC RHINITIS TRIGGER, UNSPECIFIED RHINITIS SEASONALITY: ICD-10-CM

## 2017-04-06 DIAGNOSIS — G47.00 INSOMNIA, UNSPECIFIED TYPE: ICD-10-CM

## 2017-04-06 DIAGNOSIS — R79.9 ABNORMAL FINDING OF BLOOD CHEMISTRY: ICD-10-CM

## 2017-04-06 DIAGNOSIS — Z12.31 ENCOUNTER FOR SCREENING MAMMOGRAM FOR BREAST CANCER: ICD-10-CM

## 2017-04-06 PROCEDURE — 99999 PR PBB SHADOW E&M-EST. PATIENT-LVL IV: CPT | Mod: PBBFAC,,, | Performed by: FAMILY MEDICINE

## 2017-04-06 PROCEDURE — 99396 PREV VISIT EST AGE 40-64: CPT | Mod: S$GLB,,, | Performed by: FAMILY MEDICINE

## 2017-04-06 PROCEDURE — 99499 UNLISTED E&M SERVICE: CPT | Mod: S$GLB,,, | Performed by: FAMILY MEDICINE

## 2017-04-06 RX ORDER — PANTOPRAZOLE SODIUM 20 MG/1
20 TABLET, DELAYED RELEASE ORAL DAILY
Qty: 90 TABLET | Refills: 3 | Status: SHIPPED | OUTPATIENT
Start: 2017-04-06 | End: 2017-06-13 | Stop reason: SDUPTHER

## 2017-04-06 RX ORDER — MONTELUKAST SODIUM 10 MG/1
10 TABLET ORAL NIGHTLY
Qty: 90 TABLET | Refills: 1 | Status: SHIPPED | OUTPATIENT
Start: 2017-04-06 | End: 2017-06-13 | Stop reason: SDUPTHER

## 2017-04-06 RX ORDER — FLUTICASONE PROPIONATE 50 MCG
1 SPRAY, SUSPENSION (ML) NASAL DAILY
Qty: 1 BOTTLE | Refills: 5 | Status: SHIPPED | OUTPATIENT
Start: 2017-04-06 | End: 2017-06-13 | Stop reason: SDUPTHER

## 2017-04-06 RX ORDER — TRAZODONE HYDROCHLORIDE 150 MG/1
150 TABLET ORAL NIGHTLY PRN
Qty: 90 TABLET | Refills: 1 | Status: SHIPPED | OUTPATIENT
Start: 2017-04-06 | End: 2017-06-13 | Stop reason: SDUPTHER

## 2017-04-06 RX ORDER — ALBUTEROL SULFATE 90 UG/1
2 AEROSOL, METERED RESPIRATORY (INHALATION) EVERY 6 HOURS PRN
Qty: 3 INHALER | Refills: 5 | Status: SHIPPED | OUTPATIENT
Start: 2017-04-06 | End: 2017-06-13 | Stop reason: SDUPTHER

## 2017-04-06 RX ORDER — METHOCARBAMOL 500 MG/1
TABLET, FILM COATED ORAL
Qty: 40 TABLET | Refills: 1 | Status: SHIPPED | OUTPATIENT
Start: 2017-04-06 | End: 2017-06-13 | Stop reason: SDUPTHER

## 2017-04-06 RX ORDER — LANOLIN ALCOHOL/MO/W.PET/CERES
CREAM (GRAM) TOPICAL
Qty: 90 TABLET | Refills: 1 | COMMUNITY
Start: 2017-04-06 | End: 2017-08-17 | Stop reason: SDUPTHER

## 2017-04-06 RX ORDER — HYDROXYZINE HYDROCHLORIDE 25 MG/1
25 TABLET, FILM COATED ORAL 3 TIMES DAILY PRN
Qty: 60 TABLET | Refills: 0 | Status: SHIPPED | OUTPATIENT
Start: 2017-04-06 | End: 2017-06-13 | Stop reason: SDUPTHER

## 2017-04-06 RX ORDER — ETODOLAC 400 MG/1
TABLET, FILM COATED ORAL
Qty: 60 TABLET | Refills: 3 | Status: SHIPPED | OUTPATIENT
Start: 2017-04-06 | End: 2017-06-13

## 2017-04-06 RX ORDER — TRAMADOL HYDROCHLORIDE 50 MG/1
TABLET ORAL
Qty: 82 TABLET | Refills: 0 | Status: SHIPPED | OUTPATIENT
Start: 2017-04-06 | End: 2017-06-13

## 2017-04-06 NOTE — MR AVS SNAPSHOT
[facility]  [address]  [place] [address]  Phone: [phone]  Fax: [phone]                  Bossman Kumari   2017 10:30 AM   Office Visit    Description:  Female : 1955   Provider:  Malena Arevalo MD   Department:  [facility]           Reason for Visit     Back Pain     Rash           Diagnoses this Visit        Comments    Encounter for screening mammogram for breast cancer    -  Primary     Osteoarthritis of left knee, unspecified osteoarthritis type         Osteoarthritis of ankle, unspecified laterality, unspecified osteoarthritis type         Gastroesophageal reflux disease, esophagitis presence not specified         Cervical strain, initial encounter         Delgado splint, right, subsequent encounter         Asthma, chronic, mild persistent, uncomplicated         Annual physical exam         Chronic midline low back pain without sciatica         Abnormal finding of blood chemistry                To Do List           Goals (5 Years of Data)     None       These Medications        Disp Refills Start End    trazodone (DESYREL) 150 MG tablet 90 tablet 1 2017     Take 1 tablet (150 mg total) by mouth nightly as needed for Insomnia. - Oral    Pharmacy: [facility] [address] [place] [address] EXPY AT [facility] Ph #: [phone]       tramadol (ULTRAM) 50 mg tablet 82 tablet 0 2017     1 tid, prn, pain    Pharmacy: [facility] [address] [place] [address] [place] AT [facility] Ph #: [phone]       pantoprazole (PROTONIX) 20 MG tablet 90 tablet 3 2017     Take 1 tablet (20 mg total) by mouth once daily. - Oral    Pharmacy: [facility] [address] [place] [address] [place] AT [facility] Ph #: [phone]       montelukast (SINGULAIR) 10 mg tablet 90 tablet 1 2017     Take 1 tablet (10 mg total) by mouth every evening. - Oral    Pharmacy:  [facility] [address] [place] AT [facility] Ph #: [phone]       methocarbamol (ROBAXIN) 500 MG Tab 40 tablet 1 2017     1 qid, prn neck pain    Pharmacy: [address] [place] AT [facility] Ph #: [phone]       fluticasone (FLONASE) 50 mcg/actuation nasal spray 1 Bottle 5 2017     1 spray by Each Nare route once daily. - Each Nare    Pharmacy: [address] [place] AT [facility] Ph #: [phone]       etodolac (LODINE) 400 MG tablet 60 tablet 3 2017     1 twice a day if needed. Take with food.  Stop if upsets stomach    Pharmacy: [address] [place] AT [facility] Ph #: [phone]       albuterol (PROAIR HFA) 90 mcg/actuation inhaler 3 Inhaler 5 2017    Inhale 2 puffs into the lungs every 6 (six) hours as needed. - Inhalation    Pharmacy: [address] [place] AT [facility] Ph #: [phone]         PURCHASE these Medications (No prescription required)        Start End    cyanocobalamin (VITAMIN B-12) 1000 MCG tablet 2017     Si a day    Class: OTC      [facility]     [facility] On [facility] Nurse Care Line -  Assistance  Unless otherwise directed by your provider, please contact [facility], our nurse care line that is available for  assistance.     Registered nurses in the [facility] On Call Center provide: appointment scheduling, clinical advisement, health education, and other advisory services.  Call: [phone] (toll free)               Medications           Message regarding Medications     Verify the changes and/or additions to your medication regime listed below are the same as discussed with your clinician today.  If any of these changes or additions are incorrect, please notify  "your healthcare provider.        CHANGE how you are taking these medications     Start Taking Instead of    trazodone (DESYREL) 150 MG tablet trazodone (DESYREL) 100 MG tablet    Dosage:  Take 1 tablet (150 mg total) by mouth nightly as needed for Insomnia. Dosage:  TK 1 TO 1 AND 1/2 TS PO QD AFTER MEALS AND HS PRN    Reason for Change:  Reorder            Verify that the below list of medications is an accurate representation of the medications you are currently taking.  If none reported, the list may be blank. If incorrect, please contact your healthcare provider. Carry this list with you in case of emergency.           Current Medications     albuterol (PROAIR HFA) 90 mcg/actuation inhaler Inhale 2 puffs into the lungs every 6 (six) hours as needed.    cyanocobalamin (VITAMIN B-12) 1000 MCG tablet 1 a day    etodolac (LODINE) 400 MG tablet 1 twice a day if needed. Take with food.  Stop if upsets stomach    fluticasone (FLONASE) 50 mcg/actuation nasal spray 1 spray by Each Nare route once daily.    methocarbamol (ROBAXIN) 500 MG Tab 1 qid, prn neck pain    montelukast (SINGULAIR) 10 mg tablet Take 1 tablet (10 mg total) by mouth every evening.    pantoprazole (PROTONIX) 20 MG tablet Take 1 tablet (20 mg total) by mouth once daily.    tramadol (ULTRAM) 50 mg tablet 1 tid, prn, pain    trazodone (DESYREL) 150 MG tablet Take 1 tablet (150 mg total) by mouth nightly as needed for Insomnia.           Clinical Reference Information           Your Vitals Were     BP Pulse Temp Height Weight SpO2    100/70 (BP Location: Right arm, Patient Position: Sitting, BP Method: Manual) 94 97.5 °F (36.4 °C) (Oral) 5' 4" (1.626 m) 76.5 kg (168 lb 10.4 oz) 93%    BMI                28.95 kg/m2          Blood Pressure          Most Recent Value    BP  100/70      Allergies as of 4/6/2017     No Known Allergies      Immunizations Administered on Date of Encounter - 4/6/2017     None      Orders Placed During Today's Visit      Normal " Orders This Visit    Ambulatory Consult to Back & Spine Clinic     Ambulatory Referral to Physical/Occupational Therapy     Future Labs/Procedures Expected by Expires    Hemoglobin A1c  4/6/2017 6/5/2018    Lipid panel  4/6/2017 6/5/2018    Mammo Digital Screening Bilat with CAD  4/6/2017 6/7/2018      Language Assistance Services     ATTENTION: Language assistance services are available, free of charge. Please call [phone].      ATENCIÓN: Si habla español, tiene a pa disposición servicios gratuitos de asistencia lingüística. Llame al [phone].     CHÚ Ý: N?u b?n nói Ti?ng Vi?t, có các d?ch v? h? tr? ngôn ng? mi?n phí dành cho b?n. G?i s? [phone].         [facility] complies with applicable Federal civil rights laws and does not discriminate on the basis of race, color, national origin, age, disability, or sex.

## 2017-04-06 NOTE — PROGRESS NOTES
Routine Office Visit    Patient Name: Bossman Kumari    : 1955  MRN: [id number]    Subjective:  Bossman is a 61 y.o. female who presents today for     1. Establish care / new to me   2. Chronic low back pain - pt has degenerative disc and states that she has chronic back pain. She has already done physical therapy and back injections. She is not interested in surgical management of her back pain. Pain is constant, worse on some days and is alleviated with tramadol and heating pad. She takes the tramadol as needed. She denies abuse of medication. She states pain does change in severity and there are some days when pain is worse than others.   3. Itchy skin - pt is unsure why but she is starting to have itching sensation in her skin. No associated rash. No known cause. She is unsure if she is allergic to anything that is causing these symptoms.     Review of Systems   Constitutional: Negative for chills and fever.   HENT: Negative for congestion.    Eyes: Negative for blurred vision.   Respiratory: Negative for cough.    Cardiovascular: Negative for chest pain.   Gastrointestinal: Negative for abdominal pain, constipation, diarrhea, heartburn, nausea and vomiting.   Genitourinary: Negative for dysuria.   Musculoskeletal: Negative for myalgias.   Skin: Negative for itching and rash.   Neurological: Negative for dizziness and headaches.   Psychiatric/Behavioral: Negative for depression.       Active Problem List  Patient Active Problem List   Diagnosis    DDD (degenerative disc disease), lumbar    GERD (gastroesophageal reflux disease)    Asthma, chronic    Osteoarthritis of hip    Allergic rhinitis    Depression    Plantar fasciitis    Fatty liver    Pes planus of right foot    Hammertoe    Osteoarthritis of knee    Osteoarthritis of ankle    Numbness of toes       Past Surgical History  Past Surgical History:   Procedure Laterality Date    CHOLECYSTECTOMY      TOTAL ABDOMINAL HYSTERECTOMY    "      Family History  Family History   Problem Relation Age of Onset    Hypertension Mother     Diabetes Mother     Hypertension Father        Social History  Social History     Social History    Marital status:      Spouse name: N/A    Number of children: N/A    Years of education: N/A     Occupational History    Not on file.     Social History Main Topics    Smoking status: Never Smoker    Smokeless tobacco: Never Used    Alcohol use No    Drug use: No    Sexual activity: Yes     Partners: Male     Other Topics Concern    Not on file     Social History Narrative    No narrative on file       Medications and Allergies  Reviewed and updated.       Physical Exam  /70 (BP Location: Right arm, Patient Position: Sitting, BP Method: Manual)  Pulse 94  Temp 97.5 °F (36.4 °C) (Oral)   Ht 5' 4" (1.626 m)  Wt 76.5 kg (168 lb 10.4 oz)  SpO2 (!) 93%  BMI 28.95 kg/m2  Physical Exam   Constitutional: She is oriented to person, place, and time. She appears well-developed and well-nourished.   HENT:   Head: Normocephalic and atraumatic.   Eyes: Conjunctivae and EOM are normal. Pupils are equal, round, and reactive to light.   Neck: Normal range of motion. Neck supple.   Cardiovascular: Normal rate, regular rhythm and normal heart sounds.  Exam reveals no gallop and no friction rub.    No murmur heard.  Pulmonary/Chest: Breath sounds normal. No respiratory distress.   Abdominal: Soft. Bowel sounds are normal. She exhibits no distension. There is no tenderness.   Musculoskeletal: Normal range of motion.   Lymphadenopathy:     She has no cervical adenopathy.   Neurological: She is alert and oriented to person, place, and time.   Skin: Skin is warm.   Psychiatric: She has a normal mood and affect.         Assessment/Plan:  Bossman Kumari is a 61 y.o. female who presents today for :    Annual physical exam  -     Lipid panel; Future; Expected date: 4/6/17  -     Hemoglobin A1c; Future; Expected date: " 4/6/17  I personally reviewed patient's chart and blood work   She had recent ER visit for weakness and dizziness; symptoms have improved   She states she needs all of her medications refilled     Gastroesophageal reflux disease, esophagitis presence not specified  -     pantoprazole (PROTONIX) 20 MG tablet; Take 1 tablet (20 mg total) by mouth once daily.  Dispense: 90 tablet; Refill: 3  The current medical regimen is effective;  continue present plan and medications.    Asthma, chronic, mild persistent, uncomplicated  -     montelukast (SINGULAIR) 10 mg tablet; Take 1 tablet (10 mg total) by mouth every evening.  Dispense: 90 tablet; Refill: 1  -     albuterol (PROAIR HFA) 90 mcg/actuation inhaler; Inhale 2 puffs into the lungs every 6 (six) hours as needed.  Dispense: 3 Inhaler; Refill: 5  The current medical regimen is effective;  continue present plan and medications.    Encounter for screening mammogram for breast cancer  -     Mammo Digital Screening Bilat with CAD; Future; Expected date: 4/6/17    Chronic midline low back pain without sciatica  -     tramadol (ULTRAM) 50 mg tablet; 1 tid, prn, pain  Dispense: 82 tablet; Refill: 0  -     methocarbamol (ROBAXIN) 500 MG Tab; 1 qid, prn neck pain  Dispense: 40 tablet; Refill: 1  -     etodolac (LODINE) 400 MG tablet; 1 twice a day if needed. Take with food.  Stop if upsets stomach  Dispense: 60 tablet; Refill: 3  -     Ambulatory Referral to Physical/Occupational Therapy  -     Ambulatory Consult to Back [facility] Spine Clinic  Unable to find patient on [place]  - pt may have received medication through mail order   Advise that goal is to decrease need for tramadol for back pain   Recommend that patient re-start physical therapy to decrease pain and be referred back to back and spine for further intervention   Will decrease tramdol dose 90 tablets to 82 tablets     Abnormal finding of blood chemistry   -     Lipid panel; Future; Expected date: 4/6/17  -     Hemoglobin  A1c; Future; Expected date: 4/6/17  Low blood glucose noted in chart     Allergic rhinitis, unspecified allergic rhinitis trigger, unspecified rhinitis seasonality  -     fluticasone (FLONASE) 50 mcg/actuation nasal spray; 1 spray by Each Nare route once daily.  Dispense: 1 Bottle; Refill: 5  -     hydrOXYzine HCl (ATARAX) 25 MG tablet; Take 1 tablet (25 mg total) by mouth 3 (three) times daily as needed for Itching.  Dispense: 60 tablet; Refill: 0  Will prescribe for allergy and itching     Insomnia, unspecified type  -     trazodone (DESYREL) 150 MG tablet; Take 1 tablet (150 mg total) by mouth nightly as needed for Insomnia.  Dispense: 90 tablet; Refill: 1  The current medical regimen is effective;  continue present plan and medications.    Other orders  -     cyanocobalamin (VITAMIN B-12) 1000 MCG tablet; 1 a day  Dispense: 90 tablet; Refill: 1        Return in about 3 months (around 7/6/2017), or if symptoms worsen or fail to improve.

## 2017-04-21 ENCOUNTER — TELEPHONE (OUTPATIENT)
Dept: FAMILY MEDICINE | Facility: CLINIC | Age: 62
End: 2017-04-21

## 2017-04-21 NOTE — TELEPHONE ENCOUNTER
Patient called and stated that the appt on 6/7 at [facility] [place], operated by [facility] would need to rescheduled to something on the [facility], she has no transportation to go there.

## 2017-04-21 NOTE — TELEPHONE ENCOUNTER
----- Message from Sarahydennys Sears sent at 4/21/2017  8:02 AM CDT -----  Contact: self   Pt request to speak to the nurse regarding about her lab work order. Please contact the pt at [phone]. Thanks!

## 2017-06-13 ENCOUNTER — OFFICE VISIT (OUTPATIENT)
Dept: FAMILY MEDICINE | Facility: CLINIC | Age: 62
End: 2017-06-13
Payer: MEDICARE

## 2017-06-13 VITALS
BODY MASS INDEX: 28.47 KG/M2 | WEIGHT: 166.75 LBS | OXYGEN SATURATION: 97 % | HEIGHT: 64 IN | TEMPERATURE: 98 F | HEART RATE: 65 BPM | DIASTOLIC BLOOD PRESSURE: 70 MMHG | SYSTOLIC BLOOD PRESSURE: 108 MMHG

## 2017-06-13 DIAGNOSIS — J45.30 ASTHMA, CHRONIC, MILD PERSISTENT, UNCOMPLICATED: ICD-10-CM

## 2017-06-13 DIAGNOSIS — G89.29 CHRONIC MIDLINE LOW BACK PAIN WITHOUT SCIATICA: ICD-10-CM

## 2017-06-13 DIAGNOSIS — Z12.31 ENCOUNTER FOR SCREENING MAMMOGRAM FOR BREAST CANCER: ICD-10-CM

## 2017-06-13 DIAGNOSIS — K21.9 GASTROESOPHAGEAL REFLUX DISEASE, ESOPHAGITIS PRESENCE NOT SPECIFIED: ICD-10-CM

## 2017-06-13 DIAGNOSIS — G47.00 INSOMNIA, UNSPECIFIED TYPE: ICD-10-CM

## 2017-06-13 DIAGNOSIS — J30.9 ALLERGIC RHINITIS, UNSPECIFIED ALLERGIC RHINITIS TRIGGER, UNSPECIFIED RHINITIS SEASONALITY: ICD-10-CM

## 2017-06-13 DIAGNOSIS — M51.36 DEGENERATIVE DISC DISEASE, LUMBAR: Primary | ICD-10-CM

## 2017-06-13 DIAGNOSIS — M54.50 CHRONIC MIDLINE LOW BACK PAIN WITHOUT SCIATICA: ICD-10-CM

## 2017-06-13 PROCEDURE — 99499 UNLISTED E&M SERVICE: CPT | Mod: S$GLB,,, | Performed by: FAMILY MEDICINE

## 2017-06-13 PROCEDURE — 99214 OFFICE O/P EST MOD 30 MIN: CPT | Mod: S$GLB,,, | Performed by: FAMILY MEDICINE

## 2017-06-13 PROCEDURE — 99999 PR PBB SHADOW E&M-EST. PATIENT-LVL III: CPT | Mod: PBBFAC,,, | Performed by: FAMILY MEDICINE

## 2017-06-13 RX ORDER — ETODOLAC 400 MG/1
TABLET, FILM COATED ORAL
Qty: 60 TABLET | Refills: 3 | Status: CANCELLED | OUTPATIENT
Start: 2017-06-13

## 2017-06-13 RX ORDER — HYDROXYZINE HYDROCHLORIDE 25 MG/1
25 TABLET, FILM COATED ORAL 3 TIMES DAILY PRN
Qty: 60 TABLET | Refills: 0 | Status: SHIPPED | OUTPATIENT
Start: 2017-06-13 | End: 2017-08-17 | Stop reason: SDUPTHER

## 2017-06-13 RX ORDER — MONTELUKAST SODIUM 10 MG/1
10 TABLET ORAL NIGHTLY
Qty: 90 TABLET | Refills: 1 | Status: SHIPPED | OUTPATIENT
Start: 2017-06-13 | End: 2017-08-17 | Stop reason: SDUPTHER

## 2017-06-13 RX ORDER — METHOCARBAMOL 500 MG/1
TABLET, FILM COATED ORAL
Qty: 60 TABLET | Refills: 1 | Status: SHIPPED | OUTPATIENT
Start: 2017-06-13 | End: 2017-08-17 | Stop reason: SDUPTHER

## 2017-06-13 RX ORDER — FLUTICASONE PROPIONATE 50 MCG
1 SPRAY, SUSPENSION (ML) NASAL DAILY
Qty: 1 BOTTLE | Refills: 5 | Status: SHIPPED | OUTPATIENT
Start: 2017-06-13 | End: 2017-08-17 | Stop reason: SDUPTHER

## 2017-06-13 RX ORDER — KETOROLAC TROMETHAMINE 10 MG/1
10 TABLET, FILM COATED ORAL EVERY 12 HOURS PRN
Qty: 60 TABLET | Refills: 0 | Status: SHIPPED | OUTPATIENT
Start: 2017-06-13 | End: 2017-06-26

## 2017-06-13 RX ORDER — TRAMADOL HYDROCHLORIDE 50 MG/1
50 TABLET ORAL EVERY 12 HOURS PRN
Qty: 60 TABLET | Refills: 0 | Status: SHIPPED | OUTPATIENT
Start: 2017-06-13 | End: 2017-06-23

## 2017-06-13 RX ORDER — TRAMADOL HYDROCHLORIDE 50 MG/1
TABLET ORAL
Qty: 82 TABLET | Refills: 0 | Status: CANCELLED | OUTPATIENT
Start: 2017-06-13

## 2017-06-13 RX ORDER — PANTOPRAZOLE SODIUM 20 MG/1
20 TABLET, DELAYED RELEASE ORAL DAILY
Qty: 90 TABLET | Refills: 3 | Status: SHIPPED | OUTPATIENT
Start: 2017-06-13 | End: 2017-08-17 | Stop reason: SDUPTHER

## 2017-06-13 RX ORDER — TRAMADOL HYDROCHLORIDE 50 MG/1
50 TABLET ORAL EVERY 12 HOURS PRN
Qty: 60 TABLET | Refills: 0 | Status: SHIPPED | OUTPATIENT
Start: 2017-06-13 | End: 2017-06-13 | Stop reason: SDUPTHER

## 2017-06-13 RX ORDER — ALBUTEROL SULFATE 90 UG/1
2 AEROSOL, METERED RESPIRATORY (INHALATION) EVERY 6 HOURS PRN
Qty: 3 INHALER | Refills: 5 | Status: SHIPPED | OUTPATIENT
Start: 2017-06-13 | End: 2017-08-17 | Stop reason: SDUPTHER

## 2017-06-13 RX ORDER — TRAZODONE HYDROCHLORIDE 150 MG/1
150 TABLET ORAL NIGHTLY PRN
Qty: 90 TABLET | Refills: 1 | Status: SHIPPED | OUTPATIENT
Start: 2017-06-13 | End: 2017-08-17 | Stop reason: SDUPTHER

## 2017-06-19 NOTE — PROGRESS NOTES
[facility]  Progress Note    SUBJECTIVE:     Chief Complaint   Patient presents with    Back Pain       HPI   Bossman Kumari  is a 61 y.o. female here for c/o back pain for the past few weeks. It is getting worst. Rates it as moderate/severe, non-radiating. Tramadol helps. Denies any heavy lifting/falls. Would like a cane to assist with walking.     Review of patient's allergies indicates:  No Known Allergies    Past Medical History:   Diagnosis Date    Acute cholecystitis 12/10/2015    Cholelithiasis 12/10/2015    DDD (degenerative disc disease), lumbar     Family history of diabetes mellitus type II 11/11/2015    GERD (gastroesophageal reflux disease)     Unspecified asthma, with exacerbation     Vaginal delivery     x4     Past Surgical History:   Procedure Laterality Date    CHOLECYSTECTOMY      TOTAL ABDOMINAL HYSTERECTOMY       Family History   Problem Relation Age of Onset    Hypertension Mother     Diabetes Mother     Hypertension Father      Social History   Substance Use Topics    Smoking status: Never Smoker    Smokeless tobacco: Never Used    Alcohol use No        Review of Systems   Constitutional: Negative for chills, fever and malaise/fatigue.   Respiratory: Negative.    Cardiovascular: Negative.    Gastrointestinal: Negative for abdominal pain, constipation and diarrhea.   Musculoskeletal: Positive for back pain. Negative for falls, myalgias and neck pain.   Neurological: Negative for weakness and headaches.     OBJECTIVE:     Vitals:    06/13/17 1026   BP: 108/70   Pulse: 65   Temp: 97.6 °F (36.4 °C)     Body mass index is 28.63 kg/m².    Physical Exam   Constitutional: She is oriented to person, place, and time. She appears distressed (mild).   HENT:   Head: Normocephalic and atraumatic.   Neck: Normal range of motion. Neck supple.   Musculoskeletal: She exhibits tenderness (to palpation of paraspinal muscles). She exhibits no edema.   Unsteady gait due to pain.    Neurological: She is alert and oriented to person, place, and time. No cranial nerve deficit.   Skin: Skin is warm. She is not diaphoretic.       Old records were reviewed. Labs and/or images were independently reviewed.    ASSESSMENT     1. Degenerative disc disease, lumbar    2. Allergic rhinitis, unspecified allergic rhinitis trigger, unspecified rhinitis seasonality    3. Gastroesophageal reflux disease, esophagitis presence not specified    4. Insomnia, unspecified type    5. Chronic midline low back pain without sciatica    6. Asthma, chronic, mild persistent, uncomplicated    7. Encounter for screening mammogram for breast cancer        PLAN:     Degenerative disc disease, lumbar  -     Discontinue: tramadol (ULTRAM) 50 mg tablet; Take 1 tablet (50 mg total) by mouth every 12 (twelve) hours as needed for Pain.  Dispense: 60 tablet; Refill: 0  -     ketorolac (TORADOL) 10 mg tablet; Take 1 tablet (10 mg total) by mouth every 12 (twelve) hours as needed for Pain.  Dispense: 60 tablet; Refill: 0  -     tramadol (ULTRAM) 50 mg tablet; Take 1 tablet (50 mg total) by mouth every 12 (twelve) hours as needed for Pain.  Dispense: 60 tablet; Refill: 0  -     CANE FOR HOME USE  -     Patient counseled on good posture and stretching exercises. Continue to place ice packs on affected areas 3-4 times daily. Take medications as prescribed. Instructed patient to call MD if symptoms persist or worsen.    Allergic rhinitis, unspecified allergic rhinitis trigger, unspecified rhinitis seasonality  -     hydrOXYzine HCl (ATARAX) 25 MG tablet; Take 1 tablet (25 mg total) by mouth 3 (three) times daily as needed for Itching.  Dispense: 60 tablet; Refill: 0  -     fluticasone (FLONASE) 50 mcg/actuation nasal spray; 1 spray by Each Nare route once daily.  Dispense: 1 Bottle; Refill: 5    Gastroesophageal reflux disease, esophagitis presence not specified  -     pantoprazole (PROTONIX) 20 MG tablet; Take 1 tablet (20 mg total) by  mouth once daily.  Dispense: 90 tablet; Refill: 3    Insomnia, unspecified type  -     trazodone (DESYREL) 150 MG tablet; Take 1 tablet (150 mg total) by mouth nightly as needed for Insomnia.  Dispense: 90 tablet; Refill: 1    Chronic midline low back pain without sciatica  -     methocarbamol (ROBAXIN) 500 MG Tab; 1 qid, prn neck pain  Dispense: 60 tablet; Refill: 1    Asthma, chronic, mild persistent, uncomplicated  -     albuterol (PROAIR HFA) 90 mcg/actuation inhaler; Inhale 2 puffs into the lungs every 6 (six) hours as needed.  Dispense: 3 Inhaler; Refill: 5  -     montelukast (SINGULAIR) 10 mg tablet; Take 1 tablet (10 mg total) by mouth every evening.  Dispense: 90 tablet; Refill: 1    Encounter for screening mammogram for breast cancer  -     Cancel: Mammo Digital Screening Bilateral With CAD; Future; Expected date: 06/13/2017  -     Mammo Digital Screening Bilat with Tomosynthesis_CAD; Future; Expected date: 06/15/2017    Other orders  -     Cancel: tramadol (ULTRAM) 50 mg tablet; 1 tid, prn, pain  Dispense: 82 tablet; Refill: 0  -     Cancel: etodolac (LODINE) 400 MG tablet; 1 twice a day if needed. Take with food.  Stop if upsets stomach  Dispense: 60 tablet; Refill: 3  -     Cancel: Lipid panel; Future; Expected date: 06/13/2017  -     Cancel: Case request GI: COLONOSCOPY      RTC PRN    Dave Gotti MD  06/18/2017 9:55 PM

## 2017-06-26 ENCOUNTER — HOSPITAL ENCOUNTER (EMERGENCY)
Facility: HOSPITAL | Age: 62
Discharge: HOME | End: 2017-06-26
Attending: EMERGENCY MEDICINE
Payer: MEDICARE

## 2017-06-26 VITALS
OXYGEN SATURATION: 99 % | RESPIRATION RATE: 16 BRPM | TEMPERATURE: 98 F | DIASTOLIC BLOOD PRESSURE: 63 MMHG | SYSTOLIC BLOOD PRESSURE: 96 MMHG | HEART RATE: 73 BPM

## 2017-06-26 DIAGNOSIS — R53.83 FATIGUE: ICD-10-CM

## 2017-06-26 DIAGNOSIS — M51.36 DEGENERATIVE DISC DISEASE, LUMBAR: ICD-10-CM

## 2017-06-26 DIAGNOSIS — N30.00 ACUTE CYSTITIS WITHOUT HEMATURIA: Primary | ICD-10-CM

## 2017-06-26 DIAGNOSIS — J32.9 SINUSITIS, UNSPECIFIED CHRONICITY, UNSPECIFIED LOCATION: ICD-10-CM

## 2017-06-26 DIAGNOSIS — R51.9 HEADACHE: ICD-10-CM

## 2017-06-26 LAB
ALBUMIN SERPL BCP-MCNC: 3.6 G/DL
ALP SERPL-CCNC: 121 U/L
ALT SERPL W/O P-5'-P-CCNC: 18 U/L
ANION GAP SERPL CALC-SCNC: 8 MMOL/L
AST SERPL-CCNC: 27 U/L
BACTERIA #/AREA URNS HPF: ABNORMAL /HPF
BASOPHILS # BLD AUTO: 0.03 K/UL
BASOPHILS NFR BLD: 0.5 %
BILIRUB SERPL-MCNC: 0.3 MG/DL
BILIRUB UR QL STRIP: NEGATIVE
BNP SERPL-MCNC: 10 PG/ML
BUN SERPL-MCNC: 11 MG/DL
CALCIUM SERPL-MCNC: 9.2 MG/DL
CHLORIDE SERPL-SCNC: 109 MMOL/L
CLARITY UR: CLEAR
CO2 SERPL-SCNC: 22 MMOL/L
COLOR UR: YELLOW
CREAT SERPL-MCNC: 1 MG/DL
DIFFERENTIAL METHOD: ABNORMAL
EOSINOPHIL # BLD AUTO: 0.3 K/UL
EOSINOPHIL NFR BLD: 4.9 %
ERYTHROCYTE [DISTWIDTH] IN BLOOD BY AUTOMATED COUNT: 14.7 %
EST. GFR  (AFRICAN AMERICAN): >60 ML/MIN/1.73 M^2
EST. GFR  (NON AFRICAN AMERICAN): >60 ML/MIN/1.73 M^2
GLUCOSE SERPL-MCNC: 106 MG/DL
GLUCOSE UR QL STRIP: NEGATIVE
HCT VFR BLD AUTO: 38.1 %
HGB BLD-MCNC: 12.3 G/DL
HGB UR QL STRIP: NEGATIVE
KETONES UR QL STRIP: NEGATIVE
LEUKOCYTE ESTERASE UR QL STRIP: ABNORMAL
LIPASE SERPL-CCNC: 23 U/L
LYMPHOCYTES # BLD AUTO: 2.3 K/UL
LYMPHOCYTES NFR BLD: 39.1 %
MCH RBC QN AUTO: 28 PG
MCHC RBC AUTO-ENTMCNC: 32.3 %
MCV RBC AUTO: 87 FL
MICROSCOPIC COMMENT: ABNORMAL
MONOCYTES # BLD AUTO: 0.6 K/UL
MONOCYTES NFR BLD: 10.6 %
NEUTROPHILS # BLD AUTO: 2.6 K/UL
NEUTROPHILS NFR BLD: 44.9 %
NITRITE UR QL STRIP: NEGATIVE
PH UR STRIP: 6 [PH] (ref 5–8)
PLATELET # BLD AUTO: 306 K/UL
PMV BLD AUTO: 10.8 FL
POTASSIUM SERPL-SCNC: 3.6 MMOL/L
PROT SERPL-MCNC: 8 G/DL
PROT UR QL STRIP: NEGATIVE
RBC # BLD AUTO: 4.39 M/UL
SODIUM SERPL-SCNC: 139 MMOL/L
SP GR UR STRIP: 1.01 (ref 1–1.03)
SQUAMOUS #/AREA URNS HPF: 5 /HPF
TROPONIN I SERPL DL<=0.01 NG/ML-MCNC: <0.006 NG/ML
TSH SERPL DL<=0.005 MIU/L-ACNC: 1.88 UIU/ML
URN SPEC COLLECT METH UR: ABNORMAL
UROBILINOGEN UR STRIP-ACNC: NEGATIVE EU/DL
WBC # BLD AUTO: 5.75 K/UL
WBC #/AREA URNS HPF: 12 /HPF (ref 0–5)

## 2017-06-26 PROCEDURE — 25000003 PHARM REV CODE 250: Performed by: EMERGENCY MEDICINE

## 2017-06-26 PROCEDURE — 93005 ELECTROCARDIOGRAM TRACING: CPT

## 2017-06-26 PROCEDURE — 80053 COMPREHEN METABOLIC PANEL: CPT

## 2017-06-26 PROCEDURE — 85025 COMPLETE CBC W/AUTO DIFF WBC: CPT

## 2017-06-26 PROCEDURE — 84443 ASSAY THYROID STIM HORMONE: CPT

## 2017-06-26 PROCEDURE — 81000 URINALYSIS NONAUTO W/SCOPE: CPT

## 2017-06-26 PROCEDURE — 84484 ASSAY OF TROPONIN QUANT: CPT

## 2017-06-26 PROCEDURE — 87086 URINE CULTURE/COLONY COUNT: CPT

## 2017-06-26 PROCEDURE — 83880 ASSAY OF NATRIURETIC PEPTIDE: CPT

## 2017-06-26 PROCEDURE — 99284 EMERGENCY DEPT VISIT MOD MDM: CPT

## 2017-06-26 PROCEDURE — 83690 ASSAY OF LIPASE: CPT

## 2017-06-26 RX ORDER — METOCLOPRAMIDE 10 MG/1
10 TABLET ORAL
Status: COMPLETED | OUTPATIENT
Start: 2017-06-26 | End: 2017-06-26

## 2017-06-26 RX ORDER — METOCLOPRAMIDE HYDROCHLORIDE 5 MG/ML
10 INJECTION INTRAMUSCULAR; INTRAVENOUS
Status: DISCONTINUED | OUTPATIENT
Start: 2017-06-26 | End: 2017-06-26

## 2017-06-26 RX ORDER — CEPHALEXIN 250 MG/1
500 CAPSULE ORAL
Status: COMPLETED | OUTPATIENT
Start: 2017-06-26 | End: 2017-06-26

## 2017-06-26 RX ORDER — KETOROLAC TROMETHAMINE 10 MG/1
10 TABLET, FILM COATED ORAL
Status: COMPLETED | OUTPATIENT
Start: 2017-06-26 | End: 2017-06-26

## 2017-06-26 RX ORDER — CEPHALEXIN 500 MG/1
500 CAPSULE ORAL EVERY 8 HOURS
Qty: 15 CAPSULE | Refills: 0 | Status: SHIPPED | OUTPATIENT
Start: 2017-06-26 | End: 2017-07-01

## 2017-06-26 RX ORDER — KETOROLAC TROMETHAMINE 30 MG/ML
10 INJECTION, SOLUTION INTRAMUSCULAR; INTRAVENOUS
Status: DISCONTINUED | OUTPATIENT
Start: 2017-06-26 | End: 2017-06-26

## 2017-06-26 RX ORDER — KETOROLAC TROMETHAMINE 10 MG/1
10 TABLET, FILM COATED ORAL EVERY 6 HOURS PRN
Qty: 20 TABLET | Refills: 0 | Status: SHIPPED | OUTPATIENT
Start: 2017-06-26 | End: 2018-04-10

## 2017-06-26 RX ORDER — METOCLOPRAMIDE 10 MG/1
10 TABLET ORAL EVERY 6 HOURS PRN
Qty: 20 TABLET | Refills: 0 | Status: SHIPPED | OUTPATIENT
Start: 2017-06-26 | End: 2017-08-17 | Stop reason: SDUPTHER

## 2017-06-26 RX ORDER — LORATADINE 10 MG/1
10 TABLET ORAL DAILY
Qty: 30 TABLET | Refills: 2 | Status: SHIPPED | OUTPATIENT
Start: 2017-06-26 | End: 2017-08-17 | Stop reason: SDUPTHER

## 2017-06-26 RX ADMIN — CEPHALEXIN 500 MG: 250 CAPSULE ORAL at 05:06

## 2017-06-26 RX ADMIN — KETOROLAC TROMETHAMINE 10 MG: 10 TABLET, FILM COATED ORAL at 05:06

## 2017-06-26 RX ADMIN — METOCLOPRAMIDE 10 MG: 10 TABLET ORAL at 06:06

## 2017-06-26 NOTE — ED TRIAGE NOTES
Pt here for eval of body weakness onset 8pm with headache. No relief after 2 advil PM. Denies trauma, injury,  Chest pain, or sob. Pt states I have bronchitis already. Moves all ext without diff, easy rr/nonlabored

## 2017-06-26 NOTE — ED PROVIDER NOTES
"Encounter Date: 6/26/2017    SCRIBE #1 NOTE: I, Franck Dickey, am scribing for, and in the presence of,  Farzana Morton MD. I have scribed the following portions of the note - Other sections scribed: HPI, ROS and PE.       History     Chief Complaint   Patient presents with    Fatigue    Headache     CC: Fatigue and Headache    HPI: This 61 y.o. Female presents to the ED c/o of gradual onset, moderate (6/10), frontal headache and fatigue which began at 2000 yesterday. Pt reports "feeling weak" and has a "pounding headache"; she can't rest due to the headache. Pt reports associated toe cramping which began at the same time as the headache. Pt reports headache is alleviated when lights are turned off. Pt denies headache alleviation by lying down or eating. Pt reports no exacerating factors. Pt notes that she does not usually get headaches. Pt denies nausea, vomiting, diaphoresis, fever, chest pain, SOB, and any other associated symptoms. Pt reports taking 1 Advil when the headache began, and took another 1 Advil after a few minutes, with no relief.     FHx DM type II   PMHx GERD, DDD, asthma, acute cholecystitis, and cholelithiasis  PSH: pilar, hysterectomy      The history is provided by the patient. No  was used.     Review of patient's allergies indicates:  No Known Allergies  Past Medical History:   Diagnosis Date    Acute cholecystitis 12/10/2015    Cholelithiasis 12/10/2015    DDD (degenerative disc disease), lumbar     Family history of diabetes mellitus type II 11/11/2015    GERD (gastroesophageal reflux disease)     Unspecified asthma, with exacerbation     Vaginal delivery     x4     Past Surgical History:   Procedure Laterality Date    CHOLECYSTECTOMY      TOTAL ABDOMINAL HYSTERECTOMY       Family History   Problem Relation Age of Onset    Hypertension Mother     Diabetes Mother     Hypertension Father      Social History   Substance Use Topics    Smoking status: Never " Smoker    Smokeless tobacco: Never Used    Alcohol use No     Review of Systems   Constitutional: Positive for fatigue. Negative for diaphoresis and fever.   HENT: Negative for sore throat.    Eyes: Positive for photophobia. Negative for visual disturbance.   Respiratory: Negative for shortness of breath.    Cardiovascular: Negative for chest pain.   Gastrointestinal: Negative for abdominal pain, nausea and vomiting.   Genitourinary: Negative for dysuria.   Musculoskeletal: Negative for back pain, neck pain and neck stiffness.        (+)Toe cramping   Skin: Negative for rash.   Neurological: Positive for headaches (frontal). Negative for weakness.       Physical Exam     Initial Vitals [06/26/17 0129]   BP Pulse Resp Temp SpO2   132/77 67 20 98.2 °F (36.8 °C) 96 %      MAP       95.33         Physical Exam    Nursing note and vitals reviewed.  Constitutional: She appears well-developed and well-nourished. She is not diaphoretic.  Non-toxic appearance. She does not appear ill. No distress.   Awake and alert, nontoxic-appearing   HENT:   Head: Normocephalic and atraumatic.   Mouth/Throat: Oropharynx is clear and moist.   Eyes: Conjunctivae and EOM are normal. Pupils are equal, round, and reactive to light.   Neck: Normal range of motion. Neck supple.   Cardiovascular: Normal rate, regular rhythm, normal heart sounds and intact distal pulses.   Pulmonary/Chest: Effort normal and breath sounds normal. No respiratory distress. She has no wheezes. She has no rhonchi. She has no rales.   Abdominal: Soft. Normal appearance and bowel sounds are normal. She exhibits no distension. There is no tenderness.   Musculoskeletal: Normal range of motion. She exhibits no edema or tenderness.   Neurological: She is alert and oriented to person, place, and time. She has normal strength. No cranial nerve deficit.   Skin: Skin is warm and dry.   Psychiatric: She has a normal mood and affect.         ED Course   Procedures  Labs Reviewed    CBC W/ AUTO DIFFERENTIAL - Abnormal; Notable for the following:        Result Value    RDW 14.7 (*)     All other components within normal limits   CULTURE, URINE   COMPREHENSIVE METABOLIC PANEL   B-TYPE NATRIURETIC PEPTIDE   TROPONIN I   URINALYSIS   LIPASE   TSH     EKG Readings: (Independently Interpreted)   01:37: NSR with PAC, HR 67. Normal axis and intervals. No STEMI.        X-Rays:   Independently Interpreted Readings:   Other Readings:  CXR NAD    Medical Decision Making:   History:   Old Medical Records: I decided to obtain old medical records.  Old Records Summarized: records from previous admission(s).  Initial Assessment:   This is an emergent evaluation of a 61 y.o. Female with acute onset HA and fatigue.   Differential Diagnosis:   Ddx includes migraine, viral syndrome, SAH, intracranial mass, other etiology of fatigue such as anemia, other.  Independently Interpreted Test(s):   I have ordered and independently interpreted X-rays - see prior notes.  I have ordered and independently interpreted EKG Reading(s) - see prior notes  Clinical Tests:   Lab Tests: Ordered and Reviewed  Radiological Study: Ordered and Reviewed  Medical Tests: Ordered and Reviewed  ED Management:  Head CT no intracranial disease; ?sinusitis. Neuro intact. Not thunderclap HA or worst of life; lower suspicion for SAH.    EKG reassuring.    Labs also largely without acute pathology. UA 2+ leukocytes. Patient states she is not having dysuria but given fatigue will tx (keflex).     Patient had reglan/toradol PO in ED and will be d/c'ed home on same.    F/u to PMD encouraged. Pt states she can see her physician tomorrow.            Scribe Attestation:   Scribe #1: I performed the above scribed service and the documentation accurately describes the services I performed. I attest to the accuracy of the note.    Attending Attestation:           Physician Attestation for Scribe:  Physician Attestation Statement for Scribe #1: Farzana DUNCAN  Fermín reviewed documentation, as scribed by Franck Dickey in my presence, and it is both accurate and complete.                 ED Course     Clinical Impression:   The primary encounter diagnosis was Acute cystitis without hematuria. Diagnoses of Fatigue, Headache, Degenerative disc disease, lumbar, and Sinusitis, unspecified chronicity, unspecified location were also pertinent to this visit.                           Farzana Morton MD  06/26/17 0978

## 2017-06-28 LAB — BACTERIA UR CULT: NORMAL

## 2017-07-03 ENCOUNTER — HOSPITAL ENCOUNTER (EMERGENCY)
Facility: HOSPITAL | Age: 62
Discharge: HOME | End: 2017-07-03
Attending: EMERGENCY MEDICINE
Payer: MEDICARE

## 2017-07-03 VITALS
BODY MASS INDEX: 25.61 KG/M2 | DIASTOLIC BLOOD PRESSURE: 67 MMHG | SYSTOLIC BLOOD PRESSURE: 114 MMHG | TEMPERATURE: 100 F | HEART RATE: 77 BPM | WEIGHT: 150 LBS | OXYGEN SATURATION: 98 % | HEIGHT: 64 IN | RESPIRATION RATE: 20 BRPM

## 2017-07-03 DIAGNOSIS — T18.9XXA FOREIGN BODY ALIMENTARY TRACT: ICD-10-CM

## 2017-07-03 PROCEDURE — 99283 EMERGENCY DEPT VISIT LOW MDM: CPT | Mod: 25

## 2017-07-03 PROCEDURE — 93010 ELECTROCARDIOGRAM REPORT: CPT | Mod: ,,, | Performed by: INTERNAL MEDICINE

## 2017-07-03 PROCEDURE — 25000003 PHARM REV CODE 250: Performed by: EMERGENCY MEDICINE

## 2017-07-03 PROCEDURE — 96374 THER/PROPH/DIAG INJ IV PUSH: CPT

## 2017-07-03 PROCEDURE — 93005 ELECTROCARDIOGRAM TRACING: CPT

## 2017-07-03 RX ORDER — GLUCAGON 1 MG
1 KIT INJECTION
Status: COMPLETED | OUTPATIENT
Start: 2017-07-03 | End: 2017-07-03

## 2017-07-03 RX ORDER — LIDOCAINE HYDROCHLORIDE 20 MG/ML
5 SOLUTION OROPHARYNGEAL
Status: COMPLETED | OUTPATIENT
Start: 2017-07-03 | End: 2017-07-03

## 2017-07-03 RX ADMIN — GLUCAGON 1 MG: 1 INJECTION, POWDER, LYOPHILIZED, FOR SOLUTION INTRAMUSCULAR; INTRAVENOUS at 08:07

## 2017-07-03 RX ADMIN — LIDOCAINE HYDROCHLORIDE 5 ML: 20 SOLUTION ORAL; TOPICAL at 08:07

## 2017-07-04 NOTE — ED TRIAGE NOTES
Pt seen in Ed complaining of having a chicken bone stuck in her throat. Pt sating 100% on Ra. Pt stated she can not swallow her spit.

## 2017-07-04 NOTE — ED NOTES
Pt requesting to leave MD MOOKIE to be notified. Pt refusing vital signs or any other care. Will get MOOKIE paperwork, and continue to monitor.

## 2017-07-04 NOTE — ED NOTES
Pt stated she wants to leave AMA, MD notified. Pt advised on the dangers of leaving AMA, pt refused to sign AMA form and vital signs. Pt stated she feels she has not been treated fairly and has waited for hours. Pt ambulated with steady gait to the exit. Respirations even and unlabored, in NAD.

## 2017-07-04 NOTE — ED PROVIDER NOTES
"Encounter Date: 7/3/2017    SCRIBE #1 NOTE: I, Cathie Rowell, am scribing for, and in the presence of,  Farzana Morton MD. I have scribed the following portions of the note - Other sections scribed: HPI, ROS, PE.       History     Chief Complaint   Patient presents with    Foreign Body In Throat     Pt reports "I was eating chicken just prior to arrival and I think I have a chicken bone stuck in my throat." Pt refusing to swallow her saliva and reports felling of something "caught in my throat".     CC: Foreign Body Sensation in Throat    HPI: 61 year old female presents to the ED c/o a foreign body sensation in throat with associated pain and difficulty swallowing that began 3 hours ago after pt ate chicken. Pt reports she thinks she may have swallowed a chicken bone. Pt reports attempting to vomit it up but was unsuccessful. Symptoms are acute onset and mild (4/10). Pt otherwise denies fever, chills, N/V, SOB, chest pain, cough, and choking.    No similar episodes in past.    Pt has a past medical history of Acute cholecystitis; Cholelithiasis; DDD, lumbar; GERD; Unspecified asthma, with exacerbation; and Vaginal delivery.    Pt has a past surgical history that includes Total abdominal hysterectomy and Cholecystectomy.      The history is provided by the patient. No  was used.     Review of patient's allergies indicates:  No Known Allergies  Past Medical History:   Diagnosis Date    Acute cholecystitis 12/10/2015    Cholelithiasis 12/10/2015    DDD (degenerative disc disease), lumbar     Family history of diabetes mellitus type II 11/11/2015    GERD (gastroesophageal reflux disease)     Unspecified asthma, with exacerbation     Vaginal delivery     x4     Past Surgical History:   Procedure Laterality Date    CHOLECYSTECTOMY      TOTAL ABDOMINAL HYSTERECTOMY       Family History   Problem Relation Age of Onset    Hypertension Mother     Diabetes Mother     Hypertension Father  "     Social History   Substance Use Topics    Smoking status: Never Smoker    Smokeless tobacco: Never Used    Alcohol use No     Review of Systems   Constitutional: Negative for chills, diaphoresis and fever.   HENT: Positive for sore throat (pain) and trouble swallowing. Negative for voice change.         (+) foreign body sensation in throat   Eyes: Negative for photophobia and visual disturbance.   Respiratory: Negative for cough, choking and shortness of breath.    Cardiovascular: Negative for chest pain.   Gastrointestinal: Negative for abdominal pain, diarrhea, nausea and vomiting.   Genitourinary: Negative for dysuria.   Musculoskeletal: Negative for back pain and neck pain.   Skin: Negative for rash.   Neurological: Negative for headaches.       Physical Exam     Initial Vitals [07/03/17 1847]   BP Pulse Resp Temp SpO2   114/67 77 20 99.6 °F (37.6 °C) 98 %      MAP       82.67         Physical Exam    Nursing note and vitals reviewed.  Constitutional: She appears well-developed and well-nourished. She is not diaphoretic. She does not appear ill. No distress.   Awake, alert, non-toxic appearing. Speaking in complete sentences and handling secretions.   HENT:   Head: Normocephalic and atraumatic.   Mouth/Throat: Oropharynx is clear and moist.   Eyes: Conjunctivae and EOM are normal. Pupils are equal, round, and reactive to light.   Neck: Trachea normal and normal range of motion. Neck supple.   Cardiovascular: Normal rate, regular rhythm and normal heart sounds.   Pulmonary/Chest: Effort normal and breath sounds normal. No respiratory distress. She has no wheezes. She has no rales.   Abdominal: Soft. Normal appearance and bowel sounds are normal. She exhibits no distension. There is no tenderness.   Musculoskeletal: Normal range of motion. She exhibits no edema or tenderness.   Neurological: She is alert and oriented to person, place, and time. She has normal strength.   Skin: Skin is warm and dry.    Psychiatric: She has a normal mood and affect.         ED Course   Procedures  Labs Reviewed - No data to display  EKG Readings: (Independently Interpreted)   19:17: NSR, HR 78. Normal axis and intervals. No STEMI.       X-Rays:   Independently Interpreted Readings:   Other Readings:  CXR NAD  Soft tissue neck XR NAD    Medical Decision Making:   History:   Old Medical Records: I decided to obtain old medical records.  Old Records Summarized: records from previous admission(s).  Initial Assessment:   This is an emergent evaluation of a 61 y.o. Female with foreign body sensation s/p eating chicken earlier tonight.  Differential Diagnosis:   Ddx includes esophageal foreign body, esophageal local trauma without retained foreign body, airway compromise, other.  Independently Interpreted Test(s):   I have ordered and independently interpreted X-rays - see prior notes.  I have ordered and independently interpreted EKG Reading(s) - see prior notes  Clinical Tests:   Radiological Study: Ordered and Reviewed  Medical Tests: Ordered and Reviewed  ED Management:  Patient tolerated PO viscous lidocaine and had IV glucagon. She continued to complain of foreign body sensation. However, she was tolerating secretions and had no voice change and no SOB or difficulty phonating in ED.    I had planned on discussing patient with GI versus additional treatment in ED, though my suspicion for foreign body is low (I suspect patient has local tissue trauma without retained FB). However, patient became impatient with ED delays (other critical patients) and subsequently eloped.            Scribe Attestation:   Scribe #1: I performed the above scribed service and the documentation accurately describes the services I performed. I attest to the accuracy of the note.    Attending Attestation:           Physician Attestation for Scribe:  Physician Attestation Statement for Scribe #1: I, Farzana Morton MD, reviewed documentation, as scribed by  Cathie Rowell in my presence, and it is both accurate and complete.                 ED Course     Clinical Impression:   Diagnoses of Foreign body alimentary tract and Foreign body alimentary tract were pertinent to this visit.                           Farzana Morton MD  07/04/17 0801

## 2017-08-17 ENCOUNTER — HOSPITAL ENCOUNTER (OUTPATIENT)
Dept: RADIOLOGY | Facility: HOSPITAL | Age: 62
Discharge: HOME | End: 2017-08-17
Attending: FAMILY MEDICINE
Payer: MEDICARE

## 2017-08-17 ENCOUNTER — OFFICE VISIT (OUTPATIENT)
Dept: FAMILY MEDICINE | Facility: CLINIC | Age: 62
End: 2017-08-17
Payer: MEDICARE

## 2017-08-17 VITALS
OXYGEN SATURATION: 97 % | SYSTOLIC BLOOD PRESSURE: 98 MMHG | TEMPERATURE: 99 F | HEIGHT: 64 IN | DIASTOLIC BLOOD PRESSURE: 68 MMHG | BODY MASS INDEX: 29.32 KG/M2 | HEART RATE: 67 BPM | WEIGHT: 171.75 LBS

## 2017-08-17 DIAGNOSIS — Z12.31 ENCOUNTER FOR SCREENING MAMMOGRAM FOR BREAST CANCER: ICD-10-CM

## 2017-08-17 DIAGNOSIS — J45.30 ASTHMA, CHRONIC, MILD PERSISTENT, UNCOMPLICATED: ICD-10-CM

## 2017-08-17 DIAGNOSIS — J06.9 VIRAL URI: Primary | ICD-10-CM

## 2017-08-17 DIAGNOSIS — K21.9 GASTROESOPHAGEAL REFLUX DISEASE, ESOPHAGITIS PRESENCE NOT SPECIFIED: ICD-10-CM

## 2017-08-17 DIAGNOSIS — Z12.11 ENCOUNTER FOR SCREENING FOR MALIGNANT NEOPLASM OF COLON: ICD-10-CM

## 2017-08-17 DIAGNOSIS — M54.50 CHRONIC MIDLINE LOW BACK PAIN WITHOUT SCIATICA: ICD-10-CM

## 2017-08-17 DIAGNOSIS — G47.00 INSOMNIA, UNSPECIFIED TYPE: ICD-10-CM

## 2017-08-17 DIAGNOSIS — G89.29 CHRONIC MIDLINE LOW BACK PAIN WITHOUT SCIATICA: ICD-10-CM

## 2017-08-17 PROCEDURE — 99999 PR PBB SHADOW E&M-EST. PATIENT-LVL III: CPT | Mod: PBBFAC,,, | Performed by: FAMILY MEDICINE

## 2017-08-17 PROCEDURE — 77063 BREAST TOMOSYNTHESIS BI: CPT | Mod: 26,,, | Performed by: RADIOLOGY

## 2017-08-17 PROCEDURE — 3008F BODY MASS INDEX DOCD: CPT | Mod: S$GLB,,, | Performed by: FAMILY MEDICINE

## 2017-08-17 PROCEDURE — 99499 UNLISTED E&M SERVICE: CPT | Mod: S$GLB,,, | Performed by: FAMILY MEDICINE

## 2017-08-17 PROCEDURE — 99214 OFFICE O/P EST MOD 30 MIN: CPT | Mod: S$GLB,,, | Performed by: FAMILY MEDICINE

## 2017-08-17 PROCEDURE — 77067 SCR MAMMO BI INCL CAD: CPT | Mod: 26,,, | Performed by: RADIOLOGY

## 2017-08-17 PROCEDURE — 77067 SCR MAMMO BI INCL CAD: CPT | Mod: TC

## 2017-08-17 RX ORDER — ALBUTEROL SULFATE 90 UG/1
2 AEROSOL, METERED RESPIRATORY (INHALATION) EVERY 6 HOURS PRN
Qty: 3 INHALER | Refills: 5 | Status: SHIPPED | OUTPATIENT
Start: 2017-08-17 | End: 2017-12-14 | Stop reason: SDUPTHER

## 2017-08-17 RX ORDER — PANTOPRAZOLE SODIUM 20 MG/1
20 TABLET, DELAYED RELEASE ORAL DAILY
Qty: 90 TABLET | Refills: 3 | Status: SHIPPED | OUTPATIENT
Start: 2017-08-17 | End: 2017-12-14 | Stop reason: SDUPTHER

## 2017-08-17 RX ORDER — MONTELUKAST SODIUM 10 MG/1
10 TABLET ORAL NIGHTLY
Qty: 90 TABLET | Refills: 1 | Status: SHIPPED | OUTPATIENT
Start: 2017-08-17 | End: 2017-12-14 | Stop reason: SDUPTHER

## 2017-08-17 RX ORDER — TRAZODONE HYDROCHLORIDE 150 MG/1
150 TABLET ORAL NIGHTLY PRN
Qty: 90 TABLET | Refills: 1 | Status: SHIPPED | OUTPATIENT
Start: 2017-08-17 | End: 2017-12-14 | Stop reason: SDUPTHER

## 2017-08-17 RX ORDER — METOCLOPRAMIDE 10 MG/1
10 TABLET ORAL EVERY 6 HOURS PRN
Qty: 20 TABLET | Refills: 0 | Status: SHIPPED | OUTPATIENT
Start: 2017-08-17 | End: 2020-07-20

## 2017-08-17 RX ORDER — LANOLIN ALCOHOL/MO/W.PET/CERES
CREAM (GRAM) TOPICAL
Qty: 90 TABLET | Refills: 1 | Status: SHIPPED | OUTPATIENT
Start: 2017-08-17 | End: 2020-08-25

## 2017-08-17 RX ORDER — HYDROXYZINE HYDROCHLORIDE 25 MG/1
25 TABLET, FILM COATED ORAL 3 TIMES DAILY PRN
Qty: 60 TABLET | Refills: 0 | Status: SHIPPED | OUTPATIENT
Start: 2017-08-17 | End: 2017-11-03 | Stop reason: SDUPTHER

## 2017-08-17 RX ORDER — LORATADINE 10 MG/1
10 TABLET ORAL DAILY
Qty: 30 TABLET | Refills: 2 | Status: SHIPPED | OUTPATIENT
Start: 2017-08-17 | End: 2019-10-24 | Stop reason: SDUPTHER

## 2017-08-17 RX ORDER — FLUTICASONE PROPIONATE 50 MCG
1 SPRAY, SUSPENSION (ML) NASAL DAILY
Qty: 1 BOTTLE | Refills: 5 | Status: SHIPPED | OUTPATIENT
Start: 2017-08-17 | End: 2017-11-03 | Stop reason: SDUPTHER

## 2017-08-17 RX ORDER — METHOCARBAMOL 500 MG/1
TABLET, FILM COATED ORAL
Qty: 60 TABLET | Refills: 1 | Status: SHIPPED | OUTPATIENT
Start: 2017-08-17 | End: 2017-11-03 | Stop reason: SDUPTHER

## 2017-08-17 NOTE — PROGRESS NOTES
[facility]  Progress Note    SUBJECTIVE:     Chief Complaint   Patient presents with    Back Pain    Leg Pain       HPI   Bossman Kumari  is a 61 y.o. female here for follow up of her chronic conditiions. She also has c/o cough, congestion for the past week. She has tried otc meds without relief. No fevers, chills, SOB. No known sick contacts. Not getting much better.     Review of patient's allergies indicates:  No Known Allergies    Past Medical History:   Diagnosis Date    Acute cholecystitis 12/10/2015    Cholelithiasis 12/10/2015    DDD (degenerative disc disease), lumbar     Family history of diabetes mellitus type II 11/11/2015    GERD (gastroesophageal reflux disease)     Unspecified asthma, with exacerbation     Vaginal delivery     x4     Past Surgical History:   Procedure Laterality Date    CHOLECYSTECTOMY      TOTAL ABDOMINAL HYSTERECTOMY       Family History   Problem Relation Age of Onset    Hypertension Mother     Diabetes Mother     Hypertension Father      Social History   Substance Use Topics    Smoking status: Never Smoker    Smokeless tobacco: Never Used    Alcohol use No        Review of Systems   Constitutional: Negative for chills, fever and malaise/fatigue.   HENT: Positive for congestion. Negative for sore throat.    Respiratory: Positive for cough. Negative for sputum production and shortness of breath.    Cardiovascular: Negative.  Negative for chest pain.   Gastrointestinal: Negative.  Negative for abdominal pain, nausea and vomiting.   Genitourinary: Negative.    Neurological: Negative for weakness and headaches.   All other systems reviewed and are negative.    OBJECTIVE:     Vitals:    08/17/17 0920   BP: 98/68   Pulse: 67   Temp: 98.5 °F (36.9 °C)     Body mass index is 29.48 kg/m².    Physical Exam   Constitutional: She is oriented to person, place, and time and well-developed, well-nourished, and in no distress. No distress.   HENT:   Head: Normocephalic  and atraumatic.   Right Ear: External ear normal. Tympanic membrane is not perforated, not erythematous, not retracted and not bulging. No hemotympanum.   Left Ear: External ear normal. Tympanic membrane is not perforated, not erythematous, not retracted and not bulging. No hemotympanum.   Nose: Nose normal.   Mouth/Throat: Oropharynx is clear and moist. No oropharyngeal exudate.   Eyes: Conjunctivae and EOM are normal.   Cardiovascular: Normal rate, regular rhythm and normal heart sounds.  Exam reveals no gallop and no friction rub.    No murmur heard.  Pulmonary/Chest: Effort normal and breath sounds normal. No respiratory distress. She has no wheezes. She has no rales. She exhibits no tenderness.   Abdominal: Soft. Bowel sounds are normal. She exhibits no distension. There is no tenderness. There is no rebound.   Neurological: She is alert and oriented to person, place, and time.   Skin: Skin is warm. She is not diaphoretic.       Old records were reviewed. Labs and/or images were independently reviewed.    ASSESSMENT     1. Viral URI    2. Insomnia, unspecified type    3. Gastroesophageal reflux disease, esophagitis presence not specified    4. Asthma, chronic, mild persistent, uncomplicated    5. Chronic midline low back pain without sciatica    6. Encounter for screening mammogram for breast cancer    7. Encounter for screening for malignant neoplasm of colon        PLAN:     Viral URI   -     OK to take tylenol as needed PRN fever. Take mucinex and or claritin to help decrease congestion. Educated patient to drink plenty of fluids. Instructed patient to call or RTC if symptoms persist or worsen.    Insomnia, unspecified type  -     trazodone (DESYREL) 150 MG tablet; Take 1 tablet (150 mg total) by mouth nightly as needed for Insomnia.  Dispense: 90 tablet; Refill: 1  -     Stable. Continue current regimen.    Gastroesophageal reflux disease, esophagitis presence not specified  -     pantoprazole (PROTONIX) 20  MG tablet; Take 1 tablet (20 mg total) by mouth once daily.  Dispense: 90 tablet; Refill: 3  -     Stable. Continue current regimen.    Asthma, chronic, mild persistent, uncomplicated  -     montelukast (SINGULAIR) 10 mg tablet; Take 1 tablet (10 mg total) by mouth every evening.  Dispense: 90 tablet; Refill: 1  -     albuterol (PROAIR HFA) 90 mcg/actuation inhaler; Inhale 2 puffs into the lungs every 6 (six) hours as needed.  Dispense: 3 Inhaler; Refill: 5    Chronic midline low back pain without sciatica  -     methocarbamol (ROBAXIN) 500 MG Tab; 1 qid, prn neck pain  Dispense: 60 tablet; Refill: 1    Encounter for screening mammogram for breast cancer  -     Mammo Digital Screening Bilat with CAD; Future; Expected date: 08/17/2017    Encounter for screening for malignant neoplasm of colon  -     Case request GI: COLONOSCOPY    Other orders  -     metoclopramide HCl (REGLAN) 10 MG tablet; Take 1 tablet (10 mg total) by mouth every 6 (six) hours as needed (headache or nausea.).  Dispense: 20 tablet; Refill: 0  -     hydrOXYzine HCl (ATARAX) 25 MG tablet; Take 1 tablet (25 mg total) by mouth 3 (three) times daily as needed for Itching.  Dispense: 60 tablet; Refill: 0  -     cyanocobalamin (VITAMIN B-12) 1000 MCG tablet; 1 a day  Dispense: 90 tablet; Refill: 1  -     loratadine (CLARITIN) 10 mg tablet; Take 1 tablet (10 mg total) by mouth once daily.  Dispense: 30 tablet; Refill: 2  -     fluticasone (FLONASE) 50 mcg/actuation nasal spray; 1 spray by Each Nare route once daily.  Dispense: 1 Bottle; Refill: 5      RTC PRN    Dave Gotti MD  08/17/2017 9:45 AM

## 2017-11-03 ENCOUNTER — OFFICE VISIT (OUTPATIENT)
Dept: FAMILY MEDICINE | Facility: CLINIC | Age: 62
End: 2017-11-03
Payer: MEDICARE

## 2017-11-03 VITALS
BODY MASS INDEX: 29.03 KG/M2 | TEMPERATURE: 98 F | OXYGEN SATURATION: 98 % | SYSTOLIC BLOOD PRESSURE: 106 MMHG | HEIGHT: 64 IN | HEART RATE: 63 BPM | WEIGHT: 170.06 LBS | DIASTOLIC BLOOD PRESSURE: 72 MMHG

## 2017-11-03 DIAGNOSIS — G44.209 TENSION HEADACHE: Primary | ICD-10-CM

## 2017-11-03 DIAGNOSIS — G89.29 CHRONIC MIDLINE LOW BACK PAIN WITHOUT SCIATICA: ICD-10-CM

## 2017-11-03 DIAGNOSIS — R29.898 WEAKNESS OF RIGHT LOWER EXTREMITY: ICD-10-CM

## 2017-11-03 DIAGNOSIS — M54.50 CHRONIC MIDLINE LOW BACK PAIN WITHOUT SCIATICA: ICD-10-CM

## 2017-11-03 DIAGNOSIS — Z12.11 ENCOUNTER FOR SCREENING FOR MALIGNANT NEOPLASM OF COLON: ICD-10-CM

## 2017-11-03 PROCEDURE — 99214 OFFICE O/P EST MOD 30 MIN: CPT | Mod: S$GLB,,, | Performed by: FAMILY MEDICINE

## 2017-11-03 PROCEDURE — 99999 PR PBB SHADOW E&M-EST. PATIENT-LVL IV: CPT | Mod: PBBFAC,,, | Performed by: FAMILY MEDICINE

## 2017-11-03 RX ORDER — PHENTERMINE HYDROCHLORIDE 37.5 MG/1
37.5 TABLET ORAL EVERY MORNING
Refills: 0 | COMMUNITY
Start: 2017-09-27 | End: 2018-05-25

## 2017-11-03 RX ORDER — HYDROXYZINE HYDROCHLORIDE 25 MG/1
25 TABLET, FILM COATED ORAL 3 TIMES DAILY PRN
Qty: 60 TABLET | Refills: 0 | Status: SHIPPED | OUTPATIENT
Start: 2017-11-03 | End: 2017-12-14 | Stop reason: SDUPTHER

## 2017-11-03 RX ORDER — METHOCARBAMOL 500 MG/1
TABLET, FILM COATED ORAL
Qty: 60 TABLET | Refills: 1 | Status: SHIPPED | OUTPATIENT
Start: 2017-11-03 | End: 2017-12-14 | Stop reason: SDUPTHER

## 2017-11-03 RX ORDER — FLUTICASONE PROPIONATE 50 MCG
1 SPRAY, SUSPENSION (ML) NASAL DAILY
Qty: 1 BOTTLE | Refills: 5 | Status: SHIPPED | OUTPATIENT
Start: 2017-11-03 | End: 2017-12-14 | Stop reason: SDUPTHER

## 2017-11-03 NOTE — PROGRESS NOTES
[facility]  Progress Note    SUBJECTIVE:     Chief Complaint   Patient presents with    Headache    Extremity Weakness    Back Pain       HPI   Bossman Kumari  is a 61 y.o. female here for multiple complaints.  1. Occasional headaches, which she takes tylenol for and helps. Doesn't want anything stronger.   2. Right lower extremity weakness, with tingling/numbness of right foot/toes. It is getting worst. Hasn't seen anyone for it.   3. Back pain, which she is doing well on robaxin. Admits doesn't exercise/stretch much. Rates it as moderate, whenever it flares up.     Review of patient's allergies indicates:  No Known Allergies    Past Medical History:   Diagnosis Date    Acute cholecystitis 12/10/2015    Cholelithiasis 12/10/2015    DDD (degenerative disc disease), lumbar     Family history of diabetes mellitus type II 11/11/2015    GERD (gastroesophageal reflux disease)     Unspecified asthma, with exacerbation     Vaginal delivery     x4     Past Surgical History:   Procedure Laterality Date    CHOLECYSTECTOMY      TOTAL ABDOMINAL HYSTERECTOMY  1981     Family History   Problem Relation Age of Onset    Hypertension Mother     Diabetes Mother     Hypertension Father      Social History   Substance Use Topics    Smoking status: Never Smoker    Smokeless tobacco: Never Used    Alcohol use No        Review of Systems   Constitutional: Negative for chills, fever and malaise/fatigue.   HENT: Negative.    Respiratory: Negative.  Negative for cough and shortness of breath.    Cardiovascular: Negative.  Negative for chest pain.   Gastrointestinal: Negative.  Negative for abdominal pain, nausea and vomiting.   Genitourinary: Negative.    Musculoskeletal: Positive for back pain.   Neurological: Positive for tingling (of toes of right foot) and headaches. Negative for weakness.   All other systems reviewed and are negative.    OBJECTIVE:     Vitals:    11/03/17 0906   BP: 106/72   Pulse: 63    Temp: 98 °F (36.7 °C)     Body mass index is 29.2 kg/m².    Physical Exam   Constitutional: She is oriented to person, place, and time and well-developed, well-nourished, and in no distress. No distress.   HENT:   Head: Normocephalic and atraumatic.   Nose: Nose normal.   Eyes: Conjunctivae and EOM are normal.   Neck: Normal range of motion. Neck supple.   Cardiovascular: Normal rate, regular rhythm and normal heart sounds.  Exam reveals no gallop and no friction rub.    No murmur heard.  Pulmonary/Chest: Effort normal and breath sounds normal. No respiratory distress. She has no wheezes. She has no rales. She exhibits no tenderness.   Abdominal: Soft. Bowel sounds are normal. She exhibits no distension. There is no tenderness. There is no rebound.   Musculoskeletal: She exhibits tenderness (to palpation of paraspinal muscles). She exhibits no edema.   Neurological: She is alert and oriented to person, place, and time. No cranial nerve deficit.   No neurological deficits. No true motor weakness identified.   Skin: Skin is warm. She is not diaphoretic.       Old records were reviewed. Labs and/or images were independently reviewed.    ASSESSMENT     1. Tension headache    2. Chronic midline low back pain without sciatica    3. Weakness of right lower extremity    4. Encounter for screening for malignant neoplasm of colon        PLAN:     Tension headache   -   OK with tylenol/NSAIDs prn.    Chronic midline low back pain without sciatica  -     methocarbamol (ROBAXIN) 500 MG Tab; 1 qid, prn neck pain  Dispense: 60 tablet; Refill: 1  -     Patient counseled on good posture and stretching exercises. Continue to place ice packs on affected areas 3-4 times daily. Take medications as prescribed. Instructed patient to call MD if symptoms persist or worsen.    Weakness/tingling  of right lower extremity  -     Ambulatory referral to Neurosurgery for further evaluation and treatment.    Encounter for screening for malignant  neoplasm of colon  -     Case request GI: COLONOSCOPY    Other orders  -     hydrOXYzine HCl (ATARAX) 25 MG tablet; Take 1 tablet (25 mg total) by mouth 3 (three) times daily as needed for Itching.  Dispense: 60 tablet; Refill: 0  -     fluticasone (FLONASE) 50 mcg/actuation nasal spray; 1 spray by Each Nare route once daily.  Dispense: 1 Bottle; Refill: 5      RTC BHARGAV Gotti MD  11/03/2017 1:14 PM

## 2017-11-27 ENCOUNTER — TELEPHONE (OUTPATIENT)
Dept: NEUROSURGERY | Facility: CLINIC | Age: 62
End: 2017-11-27

## 2017-11-27 NOTE — TELEPHONE ENCOUNTER
Called pt as she has trouble with transportation to see if she needs to reschedule her appt. Pt stated she forgot and rescheduled to Wednesday Nov. 29, 2017 at 10:20am.

## 2017-12-14 ENCOUNTER — OFFICE VISIT (OUTPATIENT)
Dept: FAMILY MEDICINE | Facility: CLINIC | Age: 62
End: 2017-12-14
Payer: MEDICARE

## 2017-12-14 VITALS
OXYGEN SATURATION: 97 % | SYSTOLIC BLOOD PRESSURE: 130 MMHG | TEMPERATURE: 98 F | DIASTOLIC BLOOD PRESSURE: 76 MMHG | HEART RATE: 69 BPM | HEIGHT: 64 IN | WEIGHT: 172.75 LBS | BODY MASS INDEX: 29.49 KG/M2

## 2017-12-14 DIAGNOSIS — M54.50 CHRONIC MIDLINE LOW BACK PAIN WITHOUT SCIATICA: ICD-10-CM

## 2017-12-14 DIAGNOSIS — G89.29 CHRONIC MIDLINE LOW BACK PAIN WITHOUT SCIATICA: ICD-10-CM

## 2017-12-14 DIAGNOSIS — J45.30 ASTHMA, CHRONIC, MILD PERSISTENT, UNCOMPLICATED: ICD-10-CM

## 2017-12-14 DIAGNOSIS — K21.9 GASTROESOPHAGEAL REFLUX DISEASE, ESOPHAGITIS PRESENCE NOT SPECIFIED: ICD-10-CM

## 2017-12-14 DIAGNOSIS — G47.00 INSOMNIA, UNSPECIFIED TYPE: ICD-10-CM

## 2017-12-14 PROCEDURE — 99999 PR PBB SHADOW E&M-EST. PATIENT-LVL III: CPT | Mod: PBBFAC,,, | Performed by: FAMILY MEDICINE

## 2017-12-14 PROCEDURE — 99499 UNLISTED E&M SERVICE: CPT | Mod: S$GLB,,, | Performed by: FAMILY MEDICINE

## 2017-12-14 PROCEDURE — 99214 OFFICE O/P EST MOD 30 MIN: CPT | Mod: S$GLB,,, | Performed by: FAMILY MEDICINE

## 2017-12-14 RX ORDER — PANTOPRAZOLE SODIUM 20 MG/1
20 TABLET, DELAYED RELEASE ORAL DAILY
Qty: 90 TABLET | Refills: 3 | Status: SHIPPED | OUTPATIENT
Start: 2017-12-14 | End: 2020-07-20

## 2017-12-14 RX ORDER — HYDROXYZINE HYDROCHLORIDE 25 MG/1
25 TABLET, FILM COATED ORAL 3 TIMES DAILY PRN
Qty: 60 TABLET | Refills: 0 | Status: SHIPPED | OUTPATIENT
Start: 2017-12-14 | End: 2018-04-10 | Stop reason: SDUPTHER

## 2017-12-14 RX ORDER — TRAZODONE HYDROCHLORIDE 150 MG/1
150 TABLET ORAL NIGHTLY PRN
Qty: 90 TABLET | Refills: 1 | Status: SHIPPED | OUTPATIENT
Start: 2017-12-14 | End: 2020-08-25

## 2017-12-14 RX ORDER — METHOCARBAMOL 500 MG/1
TABLET, FILM COATED ORAL
Qty: 60 TABLET | Refills: 1 | Status: SHIPPED | OUTPATIENT
Start: 2017-12-14 | End: 2020-08-25

## 2017-12-14 RX ORDER — FLUTICASONE PROPIONATE 50 MCG
1 SPRAY, SUSPENSION (ML) NASAL DAILY
Qty: 1 BOTTLE | Refills: 5 | Status: SHIPPED | OUTPATIENT
Start: 2017-12-14 | End: 2018-06-11 | Stop reason: SDUPTHER

## 2017-12-14 RX ORDER — MONTELUKAST SODIUM 10 MG/1
10 TABLET ORAL NIGHTLY
Qty: 90 TABLET | Refills: 1 | Status: SHIPPED | OUTPATIENT
Start: 2017-12-14 | End: 2020-07-20

## 2017-12-14 RX ORDER — ALBUTEROL SULFATE 90 UG/1
2 AEROSOL, METERED RESPIRATORY (INHALATION) EVERY 6 HOURS PRN
Qty: 3 INHALER | Refills: 5 | Status: SHIPPED | OUTPATIENT
Start: 2017-12-14 | End: 2018-06-11 | Stop reason: SDUPTHER

## 2017-12-14 NOTE — PROGRESS NOTES
[facility]  Progress Note    SUBJECTIVE:     Chief Complaint   Patient presents with    Back Pain       HPI   Bossman Kumari  is a 62 y.o. female here for c/o back pain and leg weakness. She has neurosurgery evaluation today for the leg weakness. The current regimen does give her some relief. Rates back pain as moderate, occasionally rates down leg which makes her legs weak at times while walking. Did have a fall but did not hit head, only hit her right lower leg which is healing well.     Review of patient's allergies indicates:  No Known Allergies    Past Medical History:   Diagnosis Date    Acute cholecystitis 12/10/2015    Cholelithiasis 12/10/2015    DDD (degenerative disc disease), lumbar     Family history of diabetes mellitus type II 11/11/2015    GERD (gastroesophageal reflux disease)     Unspecified asthma, with exacerbation     Vaginal delivery     x4     Past Surgical History:   Procedure Laterality Date    CHOLECYSTECTOMY      TOTAL ABDOMINAL HYSTERECTOMY  1981     Family History   Problem Relation Age of Onset    Hypertension Mother     Diabetes Mother     Hypertension Father      Social History   Substance Use Topics    Smoking status: Never Smoker    Smokeless tobacco: Never Used    Alcohol use No        Review of Systems   Constitutional: Negative for chills, fever and malaise/fatigue.   Respiratory: Negative.    Cardiovascular: Negative.    Gastrointestinal: Negative for abdominal pain, constipation and diarrhea.   Musculoskeletal: Positive for back pain and falls. Negative for myalgias and neck pain.   Neurological: Positive for weakness (both legs). Negative for headaches.     OBJECTIVE:     Vitals:    12/14/17 0835   BP: 130/76   Pulse: 69   Temp: 97.6 °F (36.4 °C)     Body mass index is 29.65 kg/m².    Physical Exam   Constitutional: She is oriented to person, place, and time. She appears distressed (mild).   HENT:   Head: Normocephalic and atraumatic.   Neck: Normal  range of motion. Neck supple.   Musculoskeletal: She exhibits tenderness (to palpation of paraspinal muscles). She exhibits no edema.   Neurological: She is alert and oriented to person, place, and time. No cranial nerve deficit.   Skin: Skin is warm. She is not diaphoretic.   Well healed scab on right lower leg       Old records were reviewed. Labs and/or images were independently reviewed.    ASSESSMENT     1. Chronic midline low back pain without sciatica    2. Asthma, chronic, mild persistent, uncomplicated    3. Gastroesophageal reflux disease, esophagitis presence not specified    4. Insomnia, unspecified type        PLAN:     Chronic midline low back pain without sciatica  -     methocarbamol (ROBAXIN) 500 MG Tab; 1 qid, prn neck pain  Dispense: 60 tablet; Refill: 1  -     Patient has appt with neurosurgery today for evaluation.     Asthma, chronic, mild persistent, uncomplicated  -     albuterol (PROAIR HFA) 90 mcg/actuation inhaler; Inhale 2 puffs into the lungs every 6 (six) hours as needed.  Dispense: 3 Inhaler; Refill: 5  -     montelukast (SINGULAIR) 10 mg tablet; Take 1 tablet (10 mg total) by mouth every evening.  Dispense: 90 tablet; Refill: 1  -     Stable. Continue current regimen.    Gastroesophageal reflux disease, esophagitis presence not specified  -     pantoprazole (PROTONIX) 20 MG tablet; Take 1 tablet (20 mg total) by mouth once daily.  Dispense: 90 tablet; Refill: 3  -     Stable. Continue current regimen.    Insomnia, unspecified type  -     traZODone (DESYREL) 150 MG tablet; Take 1 tablet (150 mg total) by mouth nightly as needed for Insomnia.  Dispense: 90 tablet; Refill: 1  -     Stable. Continue current regimen.    Other orders  -     hydrOXYzine HCl (ATARAX) 25 MG tablet; Take 1 tablet (25 mg total) by mouth 3 (three) times daily as needed for Itching.  Dispense: 60 tablet; Refill: 0  -     fluticasone (FLONASE) 50 mcg/actuation nasal spray; 1 spray by Each Nare route once daily.   Dispense: 1 Bottle; Refill: 5      RTC BHARGAV Gotti MD  12/14/2017 9:00 AM

## 2018-03-06 ENCOUNTER — PES CALL (OUTPATIENT)
Dept: ADMINISTRATIVE | Facility: CLINIC | Age: 63
End: 2018-03-06

## 2018-04-05 ENCOUNTER — OFFICE VISIT (OUTPATIENT)
Dept: OPTOMETRY | Facility: CLINIC | Age: 63
End: 2018-04-05
Payer: MEDICARE

## 2018-04-05 DIAGNOSIS — H25.13 NUCLEAR SCLEROSIS OF BOTH EYES: Primary | ICD-10-CM

## 2018-04-05 DIAGNOSIS — H02.402 PTOSIS, LEFT EYELID: ICD-10-CM

## 2018-04-05 DIAGNOSIS — H52.7 REFRACTIVE ERROR: ICD-10-CM

## 2018-04-05 PROCEDURE — 99999 PR PBB SHADOW E&M-EST. PATIENT-LVL II: CPT | Mod: PBBFAC,,, | Performed by: OPTOMETRIST

## 2018-04-05 PROCEDURE — 92004 COMPRE OPH EXAM NEW PT 1/>: CPT | Mod: S$GLB,,, | Performed by: OPTOMETRIST

## 2018-04-05 PROCEDURE — 99499 UNLISTED E&M SERVICE: CPT | Mod: S$PBB,,, | Performed by: OPTOMETRIST

## 2018-04-05 PROCEDURE — 92015 DETERMINE REFRACTIVE STATE: CPT | Mod: S$GLB,,, | Performed by: OPTOMETRIST

## 2018-04-05 NOTE — PROGRESS NOTES
"Subjective:       Patient ID: Bossman Kumari is a 62 y.o. female      Chief Complaint   Patient presents with    Concerns About Ocular Health     History of Present Illness  Dls: 1 yr     Pt c/o blurry vision at distance and near ou. Pt wears bifocal glasses.   Pt states no tearing  Itching ou burning ou no pain no ha's no floaters.     Eye meds:  None     Assessment/Plan:     1. Nuclear sclerosis of both eyes  Educated patient on the presence of cataracts and effects on vision, including clouded, blurred or dim vision, increasing difficulty with vision at night, sensitivity to light and glare, need for brighter light for reading and other activities, and seeing "halos" around lights. No surgery at this time. Recheck in one year.    2. Ptosis, left eyelid  Pt c/o droopy eyelid OS, difficulty with reading. Referral to Dr. Casas for consult.   - Ambulatory referral to Ophthalmology    3. Refractive error  Educated patient on refractive error and discussed lens options. Dispensed updated spectacle Rx. Educated about adaptation period to new specs.    Eyeglass Final Rx     Eyeglass Final Rx       Sphere Cylinder Axis Add    Right +1.00 +0.25 005 +2.50    Left -0.25 +1.25 120 +2.50    Expiration Date:  4/6/2019    Comments:  OPPOSITE SIGNS                Follow-up in about 1 year (around 4/5/2019) for Annual eye exam.       "

## 2018-04-10 ENCOUNTER — OFFICE VISIT (OUTPATIENT)
Dept: FAMILY MEDICINE | Facility: CLINIC | Age: 63
End: 2018-04-10
Payer: MEDICARE

## 2018-04-10 VITALS
OXYGEN SATURATION: 96 % | WEIGHT: 171.06 LBS | HEIGHT: 64 IN | SYSTOLIC BLOOD PRESSURE: 102 MMHG | DIASTOLIC BLOOD PRESSURE: 64 MMHG | HEART RATE: 68 BPM | BODY MASS INDEX: 29.2 KG/M2 | TEMPERATURE: 98 F

## 2018-04-10 DIAGNOSIS — J45.909 CHRONIC ASTHMA, UNSPECIFIED ASTHMA SEVERITY, UNSPECIFIED WHETHER COMPLICATED, UNSPECIFIED WHETHER PERSISTENT: ICD-10-CM

## 2018-04-10 DIAGNOSIS — R53.82 CHRONIC FATIGUE: ICD-10-CM

## 2018-04-10 DIAGNOSIS — Z12.11 ENCOUNTER FOR SCREENING FOR MALIGNANT NEOPLASM OF COLON: ICD-10-CM

## 2018-04-10 DIAGNOSIS — Z00.00 ROUTINE PHYSICAL EXAMINATION: Primary | ICD-10-CM

## 2018-04-10 DIAGNOSIS — R79.9 ABNORMAL FINDING OF BLOOD CHEMISTRY: ICD-10-CM

## 2018-04-10 DIAGNOSIS — M79.604 RIGHT LEG PAIN: ICD-10-CM

## 2018-04-10 DIAGNOSIS — L29.9 ITCHING: ICD-10-CM

## 2018-04-10 PROCEDURE — 99499 UNLISTED E&M SERVICE: CPT | Mod: S$PBB,,, | Performed by: FAMILY MEDICINE

## 2018-04-10 PROCEDURE — 99999 PR PBB SHADOW E&M-EST. PATIENT-LVL III: CPT | Mod: PBBFAC,,, | Performed by: FAMILY MEDICINE

## 2018-04-10 PROCEDURE — 99396 PREV VISIT EST AGE 40-64: CPT | Mod: S$GLB,,, | Performed by: FAMILY MEDICINE

## 2018-04-10 RX ORDER — HYDROXYZINE HYDROCHLORIDE 25 MG/1
25 TABLET, FILM COATED ORAL 3 TIMES DAILY PRN
Qty: 60 TABLET | Refills: 0 | Status: SHIPPED | OUTPATIENT
Start: 2018-04-10 | End: 2018-06-11 | Stop reason: SDUPTHER

## 2018-04-10 RX ORDER — DICLOFENAC SODIUM 10 MG/G
2 GEL TOPICAL 3 TIMES DAILY
Qty: 100 G | Refills: 0 | Status: SHIPPED | OUTPATIENT
Start: 2018-04-10 | End: 2020-08-25

## 2018-04-10 RX ORDER — MELOXICAM 7.5 MG/1
7.5 TABLET ORAL DAILY PRN
Qty: 30 TABLET | Refills: 0 | Status: SHIPPED | OUTPATIENT
Start: 2018-04-10 | End: 2018-08-21

## 2018-04-10 NOTE — PROGRESS NOTES
[facility]  Progress Note    SUBJECTIVE:     Chief Complaint   Patient presents with    Leg Pain       HPI   Bossman Kumari  is a 62 y.o. female here for routine physical exam. Has c/o right lower leg pain for the past couple weeks. Rates pain as mild-moderate. No falls/trauma. Touching it hurts, as well as certain positions. Good strength otherwise. No calf tenderness.     Review of patient's allergies indicates:  No Known Allergies    Past Medical History:   Diagnosis Date    Acute cholecystitis 12/10/2015    Cholelithiasis 12/10/2015    DDD (degenerative disc disease), lumbar     Family history of diabetes mellitus type II 11/11/2015    GERD (gastroesophageal reflux disease)     Unspecified asthma, with exacerbation     Vaginal delivery     x4     Past Surgical History:   Procedure Laterality Date    CHOLECYSTECTOMY      TOTAL ABDOMINAL HYSTERECTOMY  1981     Family History   Problem Relation Age of Onset    Hypertension Mother     Diabetes Mother     Cataracts Mother     Hypertension Father     Cataracts Father     No Known Problems Sister     No Known Problems Brother     No Known Problems Maternal Aunt     No Known Problems Maternal Uncle     No Known Problems Paternal Aunt     No Known Problems Paternal Uncle     No Known Problems Maternal Grandmother     No Known Problems Maternal Grandfather     No Known Problems Paternal Grandmother     No Known Problems Paternal Grandfather     Amblyopia Neg Hx     Blindness Neg Hx     Cancer Neg Hx     Glaucoma Neg Hx     Macular degeneration Neg Hx     Retinal detachment Neg Hx     Strabismus Neg Hx     Stroke Neg Hx     Thyroid disease Neg Hx      Social History   Substance Use Topics    Smoking status: Never Smoker    Smokeless tobacco: Never Used    Alcohol use No        Review of Systems   Constitutional: Negative for chills, diaphoresis and fever.   HENT: Negative for congestion, ear pain and sore throat.    Eyes:  Negative for photophobia and discharge.   Respiratory: Negative for cough, shortness of breath and wheezing.    Cardiovascular: Negative for chest pain and palpitations.   Gastrointestinal: Negative for abdominal pain, constipation, diarrhea, nausea and vomiting.   Genitourinary: Negative for dysuria and hematuria.   Musculoskeletal: Negative for back pain and myalgias.        + R leg pain   Skin: Negative for itching and rash.   Neurological: Negative for dizziness, sensory change, focal weakness, weakness and headaches.   All other systems reviewed and are negative.    OBJECTIVE:     Vitals:    04/10/18 1439   BP: 102/64   Pulse: 68   Temp: 98.1 °F (36.7 °C)     Body mass index is 29.37 kg/m².    Physical Exam   Constitutional: She is oriented to person, place, and time and well-developed, well-nourished, and in no distress. No distress.   HENT:   Head: Normocephalic and atraumatic.   Right Ear: Tympanic membrane is not perforated, not erythematous and not bulging. No hemotympanum.   Left Ear: Tympanic membrane is not perforated, not erythematous and not bulging. No hemotympanum.   Mouth/Throat: Oropharynx is clear and moist. No oropharyngeal exudate.   Eyes: Conjunctivae and EOM are normal. Pupils are equal, round, and reactive to light.   Neck: No thyromegaly present.   Cardiovascular: Normal rate, regular rhythm and normal heart sounds.  Exam reveals no gallop and no friction rub.    No murmur heard.  Pulmonary/Chest: Effort normal and breath sounds normal. No respiratory distress. She has no wheezes. She has no rales.   Abdominal: Soft. Bowel sounds are normal. She exhibits no distension. There is no tenderness. There is no rebound and no guarding.   Musculoskeletal: Normal range of motion. She exhibits tenderness (to palpation to R posterior knee along the medial proximal gastrocnemius. good strength and ROM. no obvious fractures or dislocations. no calf tenderness). She exhibits no edema.   Lymphadenopathy:      She has no cervical adenopathy.   Neurological: She is alert and oriented to person, place, and time.   Skin: Skin is warm. No rash noted. She is not diaphoretic. No erythema.       Old records were reviewed. Labs and/or images were independently reviewed.    ASSESSMENT     1. Routine physical examination    2. Right leg pain    3. Itching    4. Chronic asthma, unspecified asthma severity, unspecified whether complicated, unspecified whether persistent    5. Abnormal finding of blood chemistry     6. Chronic fatigue     7. Encounter for screening for malignant neoplasm of colon        PLAN:     Routine physical examination  -     CBC auto differential; Future  -     Comprehensive metabolic panel; Future  -     Hemoglobin A1c; Future  -     Lipid panel; Future  -     TSH; Future  -     T4, free; Future  -     We briefly discussed diet, exercise, and routine preventive exams. All questions and comments addressed.    Right leg pain (musculoskeletal)  -     diclofenac sodium (VOLTAREN) 1 % Gel; Apply 2 g topically 3 (three) times daily.  Dispense: 100 g; Refill: 0  -     meloxicam (MOBIC) 7.5 MG tablet; Take 1 tablet (7.5 mg total) by mouth daily as needed for Pain.  Dispense: 30 tablet; Refill: 0    Itching  -     hydrOXYzine HCl (ATARAX) 25 MG tablet; Take 1 tablet (25 mg total) by mouth 3 (three) times daily as needed for Itching.  Dispense: 60 tablet; Refill: 0  -     Stable. Continue current regimen.    Chronic asthma, unspecified asthma severity, unspecified whether complicated, unspecified whether persistent  -     Stable. Continue current regimen.    Encounter for screening for malignant neoplasm of colon  -     Case request GI: COLONOSCOPY      RTC  PRRIMA Gotti MD  04/10/2018 3:00 PM

## 2018-04-11 ENCOUNTER — LAB VISIT (OUTPATIENT)
Dept: LAB | Facility: HOSPITAL | Age: 63
End: 2018-04-11
Attending: FAMILY MEDICINE
Payer: MEDICARE

## 2018-04-11 DIAGNOSIS — Z00.00 ROUTINE PHYSICAL EXAMINATION: ICD-10-CM

## 2018-04-11 DIAGNOSIS — J45.909 CHRONIC ASTHMA, UNSPECIFIED ASTHMA SEVERITY, UNSPECIFIED WHETHER COMPLICATED, UNSPECIFIED WHETHER PERSISTENT: ICD-10-CM

## 2018-04-11 DIAGNOSIS — R53.82 CHRONIC FATIGUE: ICD-10-CM

## 2018-04-11 DIAGNOSIS — R79.9 ABNORMAL FINDING OF BLOOD CHEMISTRY: ICD-10-CM

## 2018-04-11 LAB
ALBUMIN SERPL BCP-MCNC: 3.5 G/DL
ALP SERPL-CCNC: 121 U/L
ALT SERPL W/O P-5'-P-CCNC: 18 U/L
ANION GAP SERPL CALC-SCNC: 5 MMOL/L
AST SERPL-CCNC: 21 U/L
BASOPHILS # BLD AUTO: 0.02 K/UL
BASOPHILS NFR BLD: 0.4 %
BILIRUB SERPL-MCNC: 0.2 MG/DL
BUN SERPL-MCNC: 11 MG/DL
CALCIUM SERPL-MCNC: 9.9 MG/DL
CHLORIDE SERPL-SCNC: 111 MMOL/L
CHOLEST SERPL-MCNC: 129 MG/DL
CHOLEST/HDLC SERPL: 3.8 {RATIO}
CO2 SERPL-SCNC: 26 MMOL/L
CREAT SERPL-MCNC: 1 MG/DL
DIFFERENTIAL METHOD: NORMAL
EOSINOPHIL # BLD AUTO: 0.3 K/UL
EOSINOPHIL NFR BLD: 5.3 %
ERYTHROCYTE [DISTWIDTH] IN BLOOD BY AUTOMATED COUNT: 14.1 %
EST. GFR  (AFRICAN AMERICAN): >60 ML/MIN/1.73 M^2
EST. GFR  (NON AFRICAN AMERICAN): >60 ML/MIN/1.73 M^2
ESTIMATED AVG GLUCOSE: 105 MG/DL
GLUCOSE SERPL-MCNC: 71 MG/DL
HBA1C MFR BLD HPLC: 5.3 %
HCT VFR BLD AUTO: 40.1 %
HDLC SERPL-MCNC: 34 MG/DL
HDLC SERPL: 26.4 %
HGB BLD-MCNC: 13 G/DL
LDLC SERPL CALC-MCNC: 74 MG/DL
LYMPHOCYTES # BLD AUTO: 2.1 K/UL
LYMPHOCYTES NFR BLD: 40.5 %
MCH RBC QN AUTO: 29.5 PG
MCHC RBC AUTO-ENTMCNC: 32.4 G/DL
MCV RBC AUTO: 91 FL
MONOCYTES # BLD AUTO: 0.6 K/UL
MONOCYTES NFR BLD: 11.6 %
NEUTROPHILS # BLD AUTO: 2.2 K/UL
NEUTROPHILS NFR BLD: 42.2 %
NONHDLC SERPL-MCNC: 95 MG/DL
PLATELET # BLD AUTO: 280 K/UL
PMV BLD AUTO: 11 FL
POTASSIUM SERPL-SCNC: 3.7 MMOL/L
PROT SERPL-MCNC: 7.6 G/DL
RBC # BLD AUTO: 4.4 M/UL
SODIUM SERPL-SCNC: 142 MMOL/L
T4 FREE SERPL-MCNC: 0.83 NG/DL
TRIGL SERPL-MCNC: 105 MG/DL
TSH SERPL DL<=0.005 MIU/L-ACNC: 0.9 UIU/ML
WBC # BLD AUTO: 5.26 K/UL

## 2018-04-11 PROCEDURE — 84443 ASSAY THYROID STIM HORMONE: CPT

## 2018-04-11 PROCEDURE — 80061 LIPID PANEL: CPT

## 2018-04-11 PROCEDURE — 84439 ASSAY OF FREE THYROXINE: CPT

## 2018-04-11 PROCEDURE — 80053 COMPREHEN METABOLIC PANEL: CPT

## 2018-04-11 PROCEDURE — 83036 HEMOGLOBIN GLYCOSYLATED A1C: CPT

## 2018-04-11 PROCEDURE — 85025 COMPLETE CBC W/AUTO DIFF WBC: CPT

## 2018-04-11 PROCEDURE — 36415 COLL VENOUS BLD VENIPUNCTURE: CPT

## 2018-06-11 ENCOUNTER — OFFICE VISIT (OUTPATIENT)
Dept: FAMILY MEDICINE | Facility: CLINIC | Age: 63
End: 2018-06-11
Payer: MEDICARE

## 2018-06-11 VITALS
HEART RATE: 59 BPM | HEIGHT: 64 IN | WEIGHT: 171.88 LBS | SYSTOLIC BLOOD PRESSURE: 98 MMHG | OXYGEN SATURATION: 97 % | DIASTOLIC BLOOD PRESSURE: 60 MMHG | TEMPERATURE: 98 F | BODY MASS INDEX: 29.34 KG/M2

## 2018-06-11 DIAGNOSIS — L29.9 ITCHING: ICD-10-CM

## 2018-06-11 DIAGNOSIS — H61.22 EXCESSIVE CERUMEN IN EAR CANAL, LEFT: Primary | ICD-10-CM

## 2018-06-11 DIAGNOSIS — J45.30 ASTHMA, CHRONIC, MILD PERSISTENT, UNCOMPLICATED: ICD-10-CM

## 2018-06-11 PROCEDURE — 99214 OFFICE O/P EST MOD 30 MIN: CPT | Mod: S$GLB,,, | Performed by: FAMILY MEDICINE

## 2018-06-11 PROCEDURE — 99999 PR PBB SHADOW E&M-EST. PATIENT-LVL III: CPT | Mod: PBBFAC,,, | Performed by: FAMILY MEDICINE

## 2018-06-11 PROCEDURE — 3008F BODY MASS INDEX DOCD: CPT | Mod: CPTII,S$GLB,, | Performed by: FAMILY MEDICINE

## 2018-06-11 PROCEDURE — 99499 UNLISTED E&M SERVICE: CPT | Mod: S$PBB,,, | Performed by: FAMILY MEDICINE

## 2018-06-11 RX ORDER — ALBUTEROL SULFATE 90 UG/1
2 AEROSOL, METERED RESPIRATORY (INHALATION) EVERY 6 HOURS PRN
Qty: 3 INHALER | Refills: 5 | Status: SHIPPED | OUTPATIENT
Start: 2018-06-11 | End: 2018-08-21 | Stop reason: SDUPTHER

## 2018-06-11 RX ORDER — FLUTICASONE PROPIONATE 50 MCG
1 SPRAY, SUSPENSION (ML) NASAL DAILY
Qty: 1 BOTTLE | Refills: 5 | Status: SHIPPED | OUTPATIENT
Start: 2018-06-11 | End: 2018-08-21 | Stop reason: SDUPTHER

## 2018-06-11 RX ORDER — HYDROXYZINE HYDROCHLORIDE 25 MG/1
25 TABLET, FILM COATED ORAL 3 TIMES DAILY PRN
Qty: 60 TABLET | Refills: 0 | Status: SHIPPED | OUTPATIENT
Start: 2018-06-11 | End: 2018-08-21 | Stop reason: SDUPTHER

## 2018-06-11 NOTE — PROGRESS NOTES
[facility]  Progress Note    SUBJECTIVE:     Chief Complaint   Patient presents with    Otalgia       HPI   Bossman Kumari  is a 62 y.o. female here for c/o left ear pain. This has been going on for the past week, onset was sudden. No fevers, chills, discharge. Has been trying to clean it with hydrogen peroxide and q-tips without relief. Rates pain as mild-moderate, and dull.     Review of patient's allergies indicates:  No Known Allergies    Past Medical History:   Diagnosis Date    Acute cholecystitis 12/10/2015    Cholelithiasis 12/10/2015    DDD (degenerative disc disease), lumbar     Family history of diabetes mellitus type II 11/11/2015    GERD (gastroesophageal reflux disease)     Unspecified asthma, with exacerbation     Vaginal delivery     x4     Past Surgical History:   Procedure Laterality Date    CHOLECYSTECTOMY      TOTAL ABDOMINAL HYSTERECTOMY  1981     Family History   Problem Relation Age of Onset    Hypertension Mother     Diabetes Mother     Cataracts Mother     Hypertension Father     Cataracts Father     No Known Problems Sister     No Known Problems Brother     No Known Problems Maternal Aunt     No Known Problems Maternal Uncle     No Known Problems Paternal Aunt     No Known Problems Paternal Uncle     No Known Problems Maternal Grandmother     No Known Problems Maternal Grandfather     No Known Problems Paternal Grandmother     No Known Problems Paternal Grandfather     Amblyopia Neg Hx     Blindness Neg Hx     Cancer Neg Hx     Glaucoma Neg Hx     Macular degeneration Neg Hx     Retinal detachment Neg Hx     Strabismus Neg Hx     Stroke Neg Hx     Thyroid disease Neg Hx      Social History   Substance Use Topics    Smoking status: Never Smoker    Smokeless tobacco: Never Used    Alcohol use No        Review of Systems   Constitutional: Negative for chills, fever and malaise/fatigue.   HENT: Positive for ear pain (L ). Negative for ear  discharge.    Respiratory: Negative.  Negative for cough and shortness of breath.    Cardiovascular: Negative.  Negative for chest pain.   Gastrointestinal: Negative.  Negative for abdominal pain, nausea and vomiting.   Genitourinary: Negative.    Neurological: Negative for weakness and headaches.   All other systems reviewed and are negative.    OBJECTIVE:     Vitals:    06/11/18 0853   BP: 98/60   Pulse: (!) 59   Temp: 97.9 °F (36.6 °C)     Body mass index is 29.5 kg/m².    Physical Exam   Constitutional: She is oriented to person, place, and time and well-developed, well-nourished, and in no distress. No distress.   HENT:   Head: Normocephalic and atraumatic.   Nose: Nose normal.   + left excessive cerumen   Eyes: Conjunctivae and EOM are normal.   Cardiovascular: Normal rate, regular rhythm and normal heart sounds.  Exam reveals no gallop and no friction rub.    No murmur heard.  Pulmonary/Chest: Effort normal and breath sounds normal. No respiratory distress. She has no wheezes. She has no rales. She exhibits no tenderness.   Abdominal: Soft. Bowel sounds are normal. She exhibits no distension. There is no tenderness. There is no rebound.   Neurological: She is alert and oriented to person, place, and time.   Skin: Skin is warm. She is not diaphoretic.       Old records were reviewed. Labs and/or images were independently reviewed.    ASSESSMENT     1. Excessive cerumen in ear canal, left    2. Itching    3. Asthma, chronic, mild persistent, uncomplicated        PLAN:     Excessive cerumen in ear canal, left    -    Recommend debrox which can be purchased OTC. Place 5-10 drops in affected ear canal twice daily x 1 week.    Itching  -     hydrOXYzine HCl (ATARAX) 25 MG tablet; Take 1 tablet (25 mg total) by mouth 3 (three) times daily as needed for Itching.  Dispense: 60 tablet; Refill: 0  -     Stable. Continue current regimen.    Asthma, chronic, mild persistent, uncomplicated  -     albuterol (PROAIR HFA) 90  mcg/actuation inhaler; Inhale 2 puffs into the lungs every 6 (six) hours as needed.  Dispense: 3 Inhaler; Refill: 5  -     Stable. Continue current regimen.    Other orders  -     fluticasone (FLONASE) 50 mcg/actuation nasal spray; 1 spray (50 mcg total) by Each Nare route once daily.  Dispense: 1 Bottle; Refill: 5      RTC BHARGAV Gotti MD  06/11/2018 9:09 AM

## 2018-06-15 ENCOUNTER — PES CALL (OUTPATIENT)
Dept: ADMINISTRATIVE | Facility: CLINIC | Age: 63
End: 2018-06-15

## 2018-08-21 ENCOUNTER — HOSPITAL ENCOUNTER (OUTPATIENT)
Dept: RADIOLOGY | Facility: HOSPITAL | Age: 63
Discharge: HOME | End: 2018-08-21
Attending: FAMILY MEDICINE
Payer: MEDICARE

## 2018-08-21 ENCOUNTER — OFFICE VISIT (OUTPATIENT)
Dept: FAMILY MEDICINE | Facility: CLINIC | Age: 63
End: 2018-08-21
Payer: MEDICARE

## 2018-08-21 VITALS
HEART RATE: 62 BPM | DIASTOLIC BLOOD PRESSURE: 70 MMHG | HEIGHT: 64 IN | TEMPERATURE: 98 F | BODY MASS INDEX: 29.23 KG/M2 | SYSTOLIC BLOOD PRESSURE: 102 MMHG | WEIGHT: 171.19 LBS | OXYGEN SATURATION: 97 %

## 2018-08-21 DIAGNOSIS — M17.11 OSTEOARTHRITIS OF RIGHT KNEE, UNSPECIFIED OSTEOARTHRITIS TYPE: Primary | ICD-10-CM

## 2018-08-21 DIAGNOSIS — L29.9 ITCHING: ICD-10-CM

## 2018-08-21 DIAGNOSIS — M17.11 OSTEOARTHRITIS OF RIGHT KNEE, UNSPECIFIED OSTEOARTHRITIS TYPE: ICD-10-CM

## 2018-08-21 DIAGNOSIS — J45.30 ASTHMA, CHRONIC, MILD PERSISTENT, UNCOMPLICATED: ICD-10-CM

## 2018-08-21 PROCEDURE — 99999 PR PBB SHADOW E&M-EST. PATIENT-LVL III: CPT | Mod: PBBFAC,,, | Performed by: FAMILY MEDICINE

## 2018-08-21 PROCEDURE — 73565 X-RAY EXAM OF KNEES: CPT | Mod: TC,FY,PO

## 2018-08-21 PROCEDURE — 3008F BODY MASS INDEX DOCD: CPT | Mod: CPTII,S$GLB,, | Performed by: FAMILY MEDICINE

## 2018-08-21 PROCEDURE — 20610 DRAIN/INJ JOINT/BURSA W/O US: CPT | Mod: RT,S$GLB,, | Performed by: FAMILY MEDICINE

## 2018-08-21 PROCEDURE — 73565 X-RAY EXAM OF KNEES: CPT | Mod: 26,,, | Performed by: RADIOLOGY

## 2018-08-21 PROCEDURE — 99214 OFFICE O/P EST MOD 30 MIN: CPT | Mod: 25,S$GLB,, | Performed by: FAMILY MEDICINE

## 2018-08-21 RX ORDER — ALBUTEROL SULFATE 90 UG/1
2 AEROSOL, METERED RESPIRATORY (INHALATION) EVERY 6 HOURS PRN
Qty: 3 INHALER | Refills: 5 | Status: SHIPPED | OUTPATIENT
Start: 2018-08-21 | End: 2019-12-05 | Stop reason: SDUPTHER

## 2018-08-21 RX ORDER — NAPROXEN 500 MG/1
500 TABLET ORAL 2 TIMES DAILY PRN
Qty: 30 TABLET | Refills: 0 | Status: SHIPPED | OUTPATIENT
Start: 2018-08-21 | End: 2019-10-24 | Stop reason: SDUPTHER

## 2018-08-21 RX ORDER — TRIAMCINOLONE ACETONIDE 40 MG/ML
40 INJECTION, SUSPENSION INTRA-ARTICULAR; INTRAMUSCULAR
Status: DISCONTINUED | OUTPATIENT
Start: 2018-08-21 | End: 2018-08-21 | Stop reason: HOSPADM

## 2018-08-21 RX ORDER — FLUTICASONE PROPIONATE 50 MCG
1 SPRAY, SUSPENSION (ML) NASAL DAILY
Qty: 1 BOTTLE | Refills: 5 | Status: SHIPPED | OUTPATIENT
Start: 2018-08-21 | End: 2019-10-24

## 2018-08-21 RX ORDER — HYDROXYZINE HYDROCHLORIDE 25 MG/1
25 TABLET, FILM COATED ORAL 3 TIMES DAILY PRN
Qty: 60 TABLET | Refills: 0 | Status: SHIPPED | OUTPATIENT
Start: 2018-08-21 | End: 2019-10-24 | Stop reason: SDUPTHER

## 2018-08-21 RX ADMIN — TRIAMCINOLONE ACETONIDE 40 MG: 40 INJECTION, SUSPENSION INTRA-ARTICULAR; INTRAMUSCULAR at 10:08

## 2018-08-21 NOTE — PROCEDURES
Large Joint Aspiration/Injection  Date/Time: 8/21/2018 10:47 AM  Performed by: Dave Gotti MD  Authorized by: Dave Gotti MD     Consent Done?:  Yes (Verbal)  Indications:  Pain  Procedure site marked: Yes    Timeout: Prior to procedure the correct patient, procedure, and site was verified      Location:  Knee  Needle size:  25 G  Medications:  40 mg triamcinolone acetonide 40 mg/mL  Patient tolerance:  Patient tolerated the procedure well with no immediate complications

## 2018-08-21 NOTE — PROGRESS NOTES
[facility]  Progress Note    SUBJECTIVE:     Chief Complaint   Patient presents with    Leg Pain       HPI   Bossman Kumari  is a 62 y.o. female here for c/o right knee pain. Rates pain as moderate, and does not radiate. No falls/trauma. Tried otc meds without relief. This started over a week ago. Patient has no other new complaints/problems at this time.      Review of patient's allergies indicates:  No Known Allergies    Past Medical History:   Diagnosis Date    Acute cholecystitis 12/10/2015    Cholelithiasis 12/10/2015    DDD (degenerative disc disease), lumbar     Family history of diabetes mellitus type II 11/11/2015    GERD (gastroesophageal reflux disease)     Unspecified asthma, with exacerbation     Vaginal delivery     x4     Past Surgical History:   Procedure Laterality Date    CHOLECYSTECTOMY      TOTAL ABDOMINAL HYSTERECTOMY  1981     Family History   Problem Relation Age of Onset    Hypertension Mother     Diabetes Mother     Cataracts Mother     Hypertension Father     Cataracts Father     No Known Problems Sister     No Known Problems Brother     No Known Problems Maternal Aunt     No Known Problems Maternal Uncle     No Known Problems Paternal Aunt     No Known Problems Paternal Uncle     No Known Problems Maternal Grandmother     No Known Problems Maternal Grandfather     No Known Problems Paternal Grandmother     No Known Problems Paternal Grandfather     Amblyopia Neg Hx     Blindness Neg Hx     Cancer Neg Hx     Glaucoma Neg Hx     Macular degeneration Neg Hx     Retinal detachment Neg Hx     Strabismus Neg Hx     Stroke Neg Hx     Thyroid disease Neg Hx      Social History     Tobacco Use    Smoking status: Never Smoker    Smokeless tobacco: Never Used   Substance Use Topics    Alcohol use: No    Drug use: No        Review of Systems   Constitutional: Negative for chills, fever and malaise/fatigue.   HENT: Negative.    Respiratory: Negative.   Negative for cough and shortness of breath.    Cardiovascular: Negative.  Negative for chest pain.   Gastrointestinal: Negative.  Negative for abdominal pain, nausea and vomiting.   Genitourinary: Negative.    Musculoskeletal: Positive for joint pain (R knee pain).   Neurological: Negative for weakness and headaches.   All other systems reviewed and are negative.    OBJECTIVE:     Vitals:    08/21/18 0950   BP: 102/70   Pulse: 62   Temp: 98 °F (36.7 °C)     Body mass index is 29.38 kg/m².    Physical Exam   Constitutional: She is oriented to person, place, and time and well-developed, well-nourished, and in no distress. No distress.   HENT:   Head: Normocephalic and atraumatic.   Eyes: Conjunctivae and EOM are normal.   Pulmonary/Chest: Effort normal.   Musculoskeletal: She exhibits tenderness (to palpation of R medial tibial-femoral compartment, decreased joint space. ACL/PCL intact, negative McMurrays sign).   Neurological: She is alert and oriented to person, place, and time.   Skin: No rash noted. She is not diaphoretic. No erythema.       Old records were reviewed. Labs and/or images were independently reviewed.    ASSESSMENT     1. Osteoarthritis of right knee, unspecified osteoarthritis type    2. Asthma, chronic, mild persistent, uncomplicated    3. Itching        PLAN:     Osteoarthritis of right knee, unspecified osteoarthritis type  -     X-Ray Knee AP Standing Bilateral; Future; Expected date: 08/21/2018  -     Start naproxen (NAPROSYN) 500 MG tablet; Take 1 tablet (500 mg total) by mouth 2 (two) times daily as needed.  Dispense: 30 tablet; Refill: 0  -     Patient counseled on good posture and stretching exercises. Continue to place ice packs on affected areas 3-4 times daily. Take medications as prescribed. Instructed patient to call MD if symptoms persist or worsen.  -     Injected today with some relief of symptoms.     Asthma, chronic, mild persistent, uncomplicated  -     albuterol (PROAIR HFA) 90  mcg/actuation inhaler; Inhale 2 puffs into the lungs every 6 (six) hours as needed.  Dispense: 3 Inhaler; Refill: 5    Itching  -     hydrOXYzine HCl (ATARAX) 25 MG tablet; Take 1 tablet (25 mg total) by mouth 3 (three) times daily as needed for Itching.  Dispense: 60 tablet; Refill: 0    Other orders  -     fluticasone (FLONASE) 50 mcg/actuation nasal spray; 1 spray (50 mcg total) by Each Nare route once daily.  Dispense: 1 Bottle; Refill: 5      RTC BHARGAV Gotti MD  08/21/2018 10:32 AM

## 2018-08-24 ENCOUNTER — PES CALL (OUTPATIENT)
Dept: ADMINISTRATIVE | Facility: CLINIC | Age: 63
End: 2018-08-24

## 2019-01-14 ENCOUNTER — HOSPITAL ENCOUNTER (EMERGENCY)
Facility: HOSPITAL | Age: 64
Discharge: HOME | End: 2019-01-14
Attending: EMERGENCY MEDICINE
Payer: MEDICARE

## 2019-01-14 VITALS
DIASTOLIC BLOOD PRESSURE: 74 MMHG | HEART RATE: 80 BPM | RESPIRATION RATE: 19 BRPM | OXYGEN SATURATION: 97 % | SYSTOLIC BLOOD PRESSURE: 124 MMHG | TEMPERATURE: 98 F | WEIGHT: 171 LBS | BODY MASS INDEX: 29.19 KG/M2 | HEIGHT: 64 IN

## 2019-01-14 DIAGNOSIS — R05.9 COUGH: ICD-10-CM

## 2019-01-14 DIAGNOSIS — B34.9 VIRAL SYNDROME: ICD-10-CM

## 2019-01-14 DIAGNOSIS — H66.92 LEFT OTITIS MEDIA, UNSPECIFIED OTITIS MEDIA TYPE: Primary | ICD-10-CM

## 2019-01-14 DIAGNOSIS — R09.81 SINUS CONGESTION: ICD-10-CM

## 2019-01-14 LAB
CTP QC/QA: YES
FLUAV AG NPH QL: NEGATIVE
FLUBV AG NPH QL: NEGATIVE

## 2019-01-14 PROCEDURE — 99284 EMERGENCY DEPT VISIT MOD MDM: CPT | Mod: 25

## 2019-01-14 RX ORDER — CETIRIZINE HYDROCHLORIDE 10 MG/1
10 TABLET ORAL DAILY
Qty: 20 TABLET | Refills: 0 | Status: SHIPPED | OUTPATIENT
Start: 2019-01-14 | End: 2019-10-24

## 2019-01-14 RX ORDER — AMOXICILLIN AND CLAVULANATE POTASSIUM 875; 125 MG/1; MG/1
1 TABLET, FILM COATED ORAL EVERY 12 HOURS
Qty: 14 TABLET | Refills: 0 | Status: SHIPPED | OUTPATIENT
Start: 2019-01-14 | End: 2019-01-21

## 2019-01-14 RX ORDER — BENZONATATE 100 MG/1
100 CAPSULE ORAL 3 TIMES DAILY PRN
Qty: 20 CAPSULE | Refills: 0 | Status: SHIPPED | OUTPATIENT
Start: 2019-01-14 | End: 2019-10-24 | Stop reason: SDUPTHER

## 2019-01-14 RX ORDER — FLUTICASONE PROPIONATE 50 MCG
1 SPRAY, SUSPENSION (ML) NASAL 2 TIMES DAILY PRN
Qty: 15 G | Refills: 0 | Status: SHIPPED | OUTPATIENT
Start: 2019-01-14 | End: 2019-10-24

## 2019-01-14 NOTE — DISCHARGE INSTRUCTIONS
Take antibiotics for ear infection as prescribed twice daily until they are gone.  You should not have any pills left over.  Take all other medications as needed and only as prescribed.    Follow-up with your regular doctor for re-evaluation and further treatment.    Return to the emergency department for any new or worsening symptoms or as needed for any additional concerns.

## 2019-01-14 NOTE — ED TRIAGE NOTES
Patient arrived to ED with c/o left earache with productive cough x 3 days.  Denies fever, sore throat, or decreased hearing.  No acute distress noted.

## 2019-01-14 NOTE — ED PROVIDER NOTES
"Encounter Date: 1/14/2019    SCRIBE #1 NOTE: I, Briana Lopes, am scribing for, and in the presence of,  Chris Allan NP. I have scribed the following portions of the note - Other sections scribed: HPI, ROS.       History     Chief Complaint   Patient presents with    Otalgia     left ear otalgia and cough x3 days; reports she came in because the ear pain is worsening and the cough is keeping her from sleeping; reports the ear pain is "on the inside"     CC: Otalgia     HPI: This is a 64 y/o female with  PMHx of DDD and GERD who presents to the ED for emergent evaluation of acute onset left ear otalgia, epistaxis, frontal headache, cough, nasal congestion, sore throat, and chest soreness that began x3 days ago. Pt reports that she has been blowing her nose a lot. Pt rates pain as moderate (7/10). Pt has been taking Sudafed, [place], Vicks VapoRub, and an oral anesthetic with no relief. Denies fever or further symptoms.         The history is provided by the patient. No  was used.     Review of patient's allergies indicates:  No Known Allergies  Past Medical History:   Diagnosis Date    Acute cholecystitis 12/10/2015    Cholelithiasis 12/10/2015    DDD (degenerative disc disease), lumbar     Family history of diabetes mellitus type II 11/11/2015    GERD (gastroesophageal reflux disease)     Unspecified asthma, with exacerbation     Vaginal delivery     x4     Past Surgical History:   Procedure Laterality Date    CHOLECYSTECTOMY      CHOLECYSTECTOMY-LAPAROSCOPIC N/A 12/10/2015    Performed by Clovis Isidro III[facility] at [facility]    TOTAL ABDOMINAL HYSTERECTOMY  1981     Family History   Problem Relation Age of Onset    Hypertension Mother     Diabetes Mother     Cataracts Mother     Hypertension Father     Cataracts Father     No Known Problems Sister     No Known Problems Brother     No Known Problems Maternal Aunt     No Known Problems Maternal Uncle     No Known " Problems Paternal Aunt     No Known Problems Paternal Uncle     No Known Problems Maternal Grandmother     No Known Problems Maternal Grandfather     No Known Problems Paternal Grandmother     No Known Problems Paternal Grandfather     Amblyopia Neg Hx     Blindness Neg Hx     Cancer Neg Hx     Glaucoma Neg Hx     Macular degeneration Neg Hx     Retinal detachment Neg Hx     Strabismus Neg Hx     Stroke Neg Hx     Thyroid disease Neg Hx      Social History     Tobacco Use    Smoking status: Never Smoker    Smokeless tobacco: Never Used   Substance Use Topics    Alcohol use: No    Drug use: No     Review of Systems   Constitutional: Negative for chills and fever.   HENT: Positive for congestion (nasal), ear pain (to left ear), nosebleeds and sore throat. Negative for rhinorrhea.    Eyes: Negative for pain and visual disturbance.   Respiratory: Positive for cough and chest tightness (soreness from coughing). Negative for shortness of breath.    Cardiovascular: Negative for chest pain.   Gastrointestinal: Negative for abdominal pain, diarrhea, nausea and vomiting.   Genitourinary: Negative for dysuria.   Musculoskeletal: Negative for back pain and neck pain.   Skin: Negative for rash.   Neurological: Positive for headaches (frontal).       Physical Exam     Initial Vitals [01/14/19 0235]   BP Pulse Resp Temp SpO2   124/74 80 19 98.4 °F (36.9 °C) 97 %      MAP       --         Physical Exam    Nursing note and vitals reviewed.  Constitutional: Vital signs are normal. She appears well-developed and well-nourished. She is not diaphoretic. She is active and cooperative.  Non-toxic appearance. She does not have a sickly appearance. She does not appear ill. No distress.   HENT:   Head: Normocephalic and atraumatic.   Right Ear: Tympanic membrane, external ear and ear canal normal. No mastoid tenderness. Tympanic membrane is not erythematous and not bulging.   Left Ear: External ear and ear canal normal. No  mastoid tenderness. Tympanic membrane is erythematous and bulging.   Nose: Mucosal edema present. No rhinorrhea. Right sinus exhibits no maxillary sinus tenderness and no frontal sinus tenderness. Left sinus exhibits no maxillary sinus tenderness and no frontal sinus tenderness.   Mouth/Throat: Uvula is midline, oropharynx is clear and moist and mucous membranes are normal. No trismus in the jaw. No uvula swelling. No oropharyngeal exudate, posterior oropharyngeal edema, posterior oropharyngeal erythema or tonsillar abscesses.   Left TM slightly erythematous and bulging.  Auditory canal is normal without evidence of infection.  Right TM and canal are normal.  No mastoid tenderness, erythema, or swelling bilaterally. Nasal mucosal edema without active epistaxis or dried blood.  No frontal or maxillary sinus tenderness. Uvula midline. No trismus.  No oropharyngeal erythema, edema, or exudate.  Tonsils are 1+ and equal bilaterally.   Eyes: Conjunctivae and EOM are normal. Pupils are equal, round, and reactive to light. Right eye exhibits no discharge. Left eye exhibits no discharge. No scleral icterus.   Neck: Normal range of motion. Neck supple. No thyromegaly present. No tracheal deviation present. No JVD present.   Cardiovascular: Normal rate, regular rhythm, normal heart sounds and intact distal pulses. Exam reveals no gallop and no friction rub.    No murmur heard.  Pulmonary/Chest: Effort normal and breath sounds normal. No accessory muscle usage or stridor. No tachypnea. No respiratory distress. She has no decreased breath sounds. She has no wheezes. She has no rhonchi. She has no rales.   Lungs clear to auscultation bilaterally in all fields.  No increased work of breathing, tachypnea, respiratory distress.   Abdominal: Soft. Bowel sounds are normal. She exhibits no distension and no mass. There is no tenderness. There is no rebound and no guarding.   Musculoskeletal: Normal range of motion. She exhibits no  "edema or tenderness.   Lymphadenopathy:     She has no cervical adenopathy.   Neurological: She is alert and oriented to person, place, and time. She has normal strength and normal reflexes. She displays normal reflexes. No cranial nerve deficit or sensory deficit.   Skin: Skin is warm and dry. No rash and no abscess noted. No erythema. No pallor.   Psychiatric: She has a normal mood and affect. Her behavior is normal. Judgment and thought content normal.         ED Course   Procedures  Labs Reviewed   POCT INFLUENZA A/B          Imaging Results          X-Ray Chest PA And Lateral (Final result)  Result time 01/14/19 03:58:23    Final result by Marcio Villareal MD (01/14/19 03:58:23)                 Impression:      No acute cardiopulmonary finding.      Electronically signed by: Marcio Villareal MD  Date:    01/14/2019  Time:    03:58             Narrative:    EXAMINATION:  XR CHEST PA AND LATERAL    CLINICAL HISTORY:  Provided history is "  Cough".    TECHNIQUE:  Frontal and lateral views of the chest were performed.    COMPARISON:  07/03/2017.    FINDINGS:  Cardiac silhouette is not enlarged.  Atherosclerotic calcifications overlie the aortic arch.  There are coarsened interstitial lung markings, but no large focal consolidation.  No sizable pleural effusion.  No pneumothorax.  No detrimental change.                                 Medical Decision Making:   Differential Diagnosis:   Otitis media, otitis externa, mastoiditis, sinusitis, pharyngitis, viral URI, influenza, bronchitis, pneumonia, others  Clinical Tests:   Radiological Study: Ordered and Reviewed  ED Management:  Patient is afebrile, well-appearing, nontoxic, in no distress.  Hemodynamically stable. Vitals within normal limits. Symptoms are consistent with an acute viral upper respiratory infection.  Also with evidence of left otitis media.  No evidence of mastoiditis, perforated TM, foreign body, or otitis externa.  Based upon the history and " physical exam the patient does not appear to have a serious bacterial infection such as pneumonia, sepsis, bacterial sinusitis, strep pharyngitis, parapharyngeal or peritonsillar abscess, meningitis.  Influenza swab was negative. Respiratory effort is normal with no signs of increased work of breathing or respiratory distress. Chest x-ray with no evidence of pneumonia or other acute abnormality.  Mucous membranes are moist and the patient is tolerating P.O. Patient is afebrile, alert, active, and appears very well at this time and I have given specific return precautions to the patient.  Will treat with antibiotics due to otitis media.  Also prescribed medications for symptomatic relief.    The results and physical exam findings were reviewed with the patient. Advised patient to follow up with her PCP for re-evaluation and further management.  ED return precautions given. All questions regarding diagnosis and plan were answered to the patient's fullest possible satisfaction. Patient expressed understanding of diagnosis, discharge instructions, and return precautions.    My attending physician was available for consultation during this case            Scribe Attestation:   Scribe #1: I performed the above scribed service and the documentation accurately describes the services I performed. I attest to the accuracy of the note.    Attending Attestation:           Physician Attestation for Scribe:  Physician Attestation Statement for Scribe #1: I, Chris Allan NP, reviewed documentation, as scribed by Briana Lopes in my presence, and it is both accurate and complete.                    Clinical Impression:   The primary encounter diagnosis was Left otitis media, unspecified otitis media type. Diagnoses of Cough, Viral syndrome, and Sinus congestion were also pertinent to this visit.      Disposition:   Disposition: Discharged  Condition: Stable                        Chris Allan NP  01/14/19 0538

## 2019-02-27 ENCOUNTER — PES CALL (OUTPATIENT)
Dept: ADMINISTRATIVE | Facility: CLINIC | Age: 64
End: 2019-02-27

## 2019-06-11 DIAGNOSIS — Z12.11 ENCOUNTER FOR FIT (FECAL IMMUNOCHEMICAL TEST) SCREENING: Primary | ICD-10-CM

## 2019-06-13 PROBLEM — H25.13 NUCLEAR SCLEROSIS OF BOTH EYES: Status: ACTIVE | Noted: 2019-06-13

## 2019-06-13 PROBLEM — H52.7 REFRACTIVE ERROR: Status: ACTIVE | Noted: 2019-06-13

## 2019-06-28 ENCOUNTER — PATIENT OUTREACH (OUTPATIENT)
Dept: ADMINISTRATIVE | Facility: OTHER | Age: 64
End: 2019-06-28

## 2019-07-22 ENCOUNTER — PES CALL (OUTPATIENT)
Dept: ADMINISTRATIVE | Facility: CLINIC | Age: 64
End: 2019-07-22

## 2019-08-02 ENCOUNTER — PES CALL (OUTPATIENT)
Dept: ADMINISTRATIVE | Facility: CLINIC | Age: 64
End: 2019-08-02

## 2019-08-05 ENCOUNTER — PES CALL (OUTPATIENT)
Dept: ADMINISTRATIVE | Facility: CLINIC | Age: 64
End: 2019-08-05

## 2019-08-23 DIAGNOSIS — Z12.39 BREAST CANCER SCREENING: ICD-10-CM

## 2019-09-13 ENCOUNTER — PATIENT OUTREACH (OUTPATIENT)
Dept: ADMINISTRATIVE | Facility: OTHER | Age: 64
End: 2019-09-13

## 2019-10-01 ENCOUNTER — TELEPHONE (OUTPATIENT)
Dept: FAMILY MEDICINE | Facility: CLINIC | Age: 64
End: 2019-10-01

## 2019-10-01 NOTE — TELEPHONE ENCOUNTER
Spoke with the pt and she is using  transportation and the  told her, that she might be 15 to 20 minutes late. I advise the pt, that if she is more then 15 minutes late, please call first to see if the doctor will still she her.

## 2019-10-01 NOTE — TELEPHONE ENCOUNTER
----- Message from Alisha Julien sent at 10/1/2019 10:55 AM CDT -----  Contact: Self   Type: Patient Call Back    What is the request in detail: Pt calling to speak to a nurse regarding being late to her appt today.     Can the clinic reply by [facility]? No    Would the patient rather a call back or a response via [facility]? Call back     Best call back number: [phone]

## 2019-10-10 ENCOUNTER — PATIENT OUTREACH (OUTPATIENT)
Dept: ADMINISTRATIVE | Facility: OTHER | Age: 64
End: 2019-10-10

## 2019-10-14 ENCOUNTER — OFFICE VISIT (OUTPATIENT)
Dept: OPTOMETRY | Facility: CLINIC | Age: 64
End: 2019-10-14
Payer: MEDICARE

## 2019-10-14 DIAGNOSIS — H10.13 ALLERGIC CONJUNCTIVITIS, BILATERAL: ICD-10-CM

## 2019-10-14 DIAGNOSIS — H02.402 PTOSIS, LEFT EYELID: ICD-10-CM

## 2019-10-14 DIAGNOSIS — H25.13 NUCLEAR SCLEROSIS OF BOTH EYES: Primary | ICD-10-CM

## 2019-10-14 DIAGNOSIS — H52.7 REFRACTIVE ERROR: ICD-10-CM

## 2019-10-14 PROCEDURE — 92014 PR EYE EXAM, EST PATIENT,COMPREHESV: ICD-10-PCS | Mod: S$GLB,,, | Performed by: OPTOMETRIST

## 2019-10-14 PROCEDURE — 92014 COMPRE OPH EXAM EST PT 1/>: CPT | Mod: S$GLB,,, | Performed by: OPTOMETRIST

## 2019-10-14 PROCEDURE — 92015 DETERMINE REFRACTIVE STATE: CPT | Mod: S$GLB,,, | Performed by: OPTOMETRIST

## 2019-10-14 PROCEDURE — 99999 PR PBB SHADOW E&M-EST. PATIENT-LVL II: ICD-10-PCS | Mod: PBBFAC,,, | Performed by: OPTOMETRIST

## 2019-10-14 PROCEDURE — 99999 PR PBB SHADOW E&M-EST. PATIENT-LVL II: CPT | Mod: PBBFAC,,, | Performed by: OPTOMETRIST

## 2019-10-14 PROCEDURE — 92015 PR REFRACTION: ICD-10-PCS | Mod: S$GLB,,, | Performed by: OPTOMETRIST

## 2019-10-14 RX ORDER — OLOPATADINE HYDROCHLORIDE 1 MG/ML
1 SOLUTION/ DROPS OPHTHALMIC 2 TIMES DAILY
Qty: 5 ML | Refills: 2 | Status: SHIPPED | OUTPATIENT
Start: 2019-10-14 | End: 2020-08-25

## 2019-10-14 NOTE — PROGRESS NOTES
"Subjective:       Patient ID: Bossman Kumari is a 63 y.o. female      Chief Complaint   Patient presents with    Concerns About Ocular Health    Diabetic Eye Exam     History of Present Illness  Dls: 4/5/18 Dr. Dunne     64 y/o female presents today for diabetic eye exam.   Pt c/o drooping lids [place]. Pt c/ blurry vision os.   Pt wears bifocal glasses.     LBS ?     No tearing  + itching  No burning  No pain  No ha's  No floaters  No flashes    Eye meds  otc gtts ou prn         Assessment/Plan:     1. Nuclear sclerosis of both eyes  Educated patient on the presence of cataracts and effects on vision, including clouded, blurred or dim vision, increasing difficulty with vision at night, sensitivity to light and glare, need for brighter light for reading and other activities, and seeing "halos" around lights. No surgery at this time. Recheck in one year.    2. Allergic conjunctivitis, bilateral  Patanol BID for itching. AT PRN.  - olopatadine (PATANOL) 0.1 % ophthalmic solution; Place 1 drop into both eyes 2 (two) times daily.  Dispense: 5 mL; Refill: 2    3. Ptosis, left eyelid  Pt had appt with Dr. Casas scheduled last year but canceled. Does not want appt scheduled at this time. States she will call to schedule when ready.   - Ambulatory referral to Ophthalmology    4. Refractive error  Pt c/o difficulty with reading in OS - likely due to ptosis when looking down in BF. Recommend SVL readers for near if pt does extensive near work.     Educated patient on refractive error and discussed lens options. Dispensed updated spectacle Rx. Educated about adaptation period to new specs.    Eyeglass Final Rx     Eyeglass Final Rx       Sphere Cylinder Axis Add    Right +1.00 +0.75 005 +2.50    Left [place] +1.25 120 +2.50    Expiration Date:  10/14/2020                  Follow up in about 1 year (around 10/14/2020).       "

## 2019-10-24 ENCOUNTER — OFFICE VISIT (OUTPATIENT)
Dept: FAMILY MEDICINE | Facility: CLINIC | Age: 64
End: 2019-10-24
Payer: MEDICARE

## 2019-10-24 VITALS
DIASTOLIC BLOOD PRESSURE: 72 MMHG | WEIGHT: 170.63 LBS | HEIGHT: 64 IN | OXYGEN SATURATION: 97 % | SYSTOLIC BLOOD PRESSURE: 126 MMHG | HEART RATE: 63 BPM | TEMPERATURE: 98 F | BODY MASS INDEX: 29.13 KG/M2

## 2019-10-24 DIAGNOSIS — R79.9 ABNORMAL FINDING OF BLOOD CHEMISTRY, UNSPECIFIED: ICD-10-CM

## 2019-10-24 DIAGNOSIS — Z00.00 ROUTINE PHYSICAL EXAMINATION: Primary | ICD-10-CM

## 2019-10-24 DIAGNOSIS — Z12.31 ENCOUNTER FOR SCREENING MAMMOGRAM FOR BREAST CANCER: ICD-10-CM

## 2019-10-24 DIAGNOSIS — R93.89 ABNORMAL FINDINGS ON DIAGNOSTIC IMAGING OF OTHER SPECIFIED BODY STRUCTURES: ICD-10-CM

## 2019-10-24 DIAGNOSIS — J06.9 VIRAL URI: ICD-10-CM

## 2019-10-24 DIAGNOSIS — M54.31 RIGHT SIDED SCIATICA: ICD-10-CM

## 2019-10-24 DIAGNOSIS — L29.9 ITCHING: ICD-10-CM

## 2019-10-24 DIAGNOSIS — M17.11 OSTEOARTHRITIS OF RIGHT KNEE, UNSPECIFIED OSTEOARTHRITIS TYPE: ICD-10-CM

## 2019-10-24 PROCEDURE — 99214 OFFICE O/P EST MOD 30 MIN: CPT | Mod: 25,S$GLB,, | Performed by: FAMILY MEDICINE

## 2019-10-24 PROCEDURE — 3008F PR BODY MASS INDEX (BMI) DOCUMENTED: ICD-10-PCS | Mod: CPTII,S$GLB,, | Performed by: FAMILY MEDICINE

## 2019-10-24 PROCEDURE — 99396 PREV VISIT EST AGE 40-64: CPT | Mod: S$GLB,,, | Performed by: FAMILY MEDICINE

## 2019-10-24 PROCEDURE — 3008F BODY MASS INDEX DOCD: CPT | Mod: CPTII,S$GLB,, | Performed by: FAMILY MEDICINE

## 2019-10-24 PROCEDURE — 99999 PR PBB SHADOW E&M-EST. PATIENT-LVL IV: CPT | Mod: PBBFAC,,, | Performed by: FAMILY MEDICINE

## 2019-10-24 PROCEDURE — 99214 PR OFFICE/OUTPT VISIT, EST, LEVL IV, 30-39 MIN: ICD-10-PCS | Mod: 25,S$GLB,, | Performed by: FAMILY MEDICINE

## 2019-10-24 PROCEDURE — 99999 PR PBB SHADOW E&M-EST. PATIENT-LVL IV: ICD-10-PCS | Mod: PBBFAC,,, | Performed by: FAMILY MEDICINE

## 2019-10-24 PROCEDURE — 99396 PR PREVENTIVE VISIT,EST,40-64: ICD-10-PCS | Mod: S$GLB,,, | Performed by: FAMILY MEDICINE

## 2019-10-24 RX ORDER — LORATADINE 10 MG/1
10 TABLET ORAL DAILY
Qty: 30 TABLET | Refills: 2 | Status: SHIPPED | OUTPATIENT
Start: 2019-10-24 | End: 2020-07-20

## 2019-10-24 RX ORDER — HYDROXYZINE HYDROCHLORIDE 25 MG/1
25 TABLET, FILM COATED ORAL 3 TIMES DAILY PRN
Qty: 60 TABLET | Refills: 0 | Status: SHIPPED | OUTPATIENT
Start: 2019-10-24 | End: 2019-12-05 | Stop reason: SDUPTHER

## 2019-10-24 RX ORDER — BENZONATATE 100 MG/1
100 CAPSULE ORAL 3 TIMES DAILY PRN
Qty: 30 CAPSULE | Refills: 0 | Status: SHIPPED | OUTPATIENT
Start: 2019-10-24 | End: 2019-12-05 | Stop reason: SDUPTHER

## 2019-10-24 RX ORDER — NAPROXEN 500 MG/1
500 TABLET ORAL 2 TIMES DAILY PRN
Qty: 60 TABLET | Refills: 0 | Status: SHIPPED | OUTPATIENT
Start: 2019-10-24 | End: 2020-07-20

## 2019-10-24 RX ORDER — FLUTICASONE PROPIONATE 50 MCG
1 SPRAY, SUSPENSION (ML) NASAL DAILY
Qty: 1 BOTTLE | Refills: 5 | Status: SHIPPED | OUTPATIENT
Start: 2019-10-24 | End: 2019-12-05 | Stop reason: SDUPTHER

## 2019-10-24 NOTE — PROGRESS NOTES
[facility]  Progress Note    SUBJECTIVE:     Chief Complaint   Patient presents with    Leg Pain     Right leg       HPI   Bossman Kumari  is a 63 y.o. female here for routine physical exam. Also has c/o right lower leg pain which will be discussed below. Patient has no other new complaints/problems at this time.      Review of patient's allergies indicates:  No Known Allergies    Past Medical History:   Diagnosis Date    Acute cholecystitis 12/10/2015    Cholelithiasis 12/10/2015    DDD (degenerative disc disease), lumbar     Family history of diabetes mellitus type II 11/11/2015    GERD (gastroesophageal reflux disease)     Nuclear sclerosis of both eyes 6/13/2019    Unspecified asthma, with exacerbation     Vaginal delivery     x4     Past Surgical History:   Procedure Laterality Date    CHOLECYSTECTOMY      TOTAL ABDOMINAL HYSTERECTOMY  1981     Family History   Problem Relation Age of Onset    Hypertension Mother     Diabetes Mother     Cataracts Mother     Hypertension Father     Cataracts Father     No Known Problems Sister     No Known Problems Brother     No Known Problems Maternal Aunt     No Known Problems Maternal Uncle     No Known Problems Paternal Aunt     No Known Problems Paternal Uncle     No Known Problems Maternal Grandmother     No Known Problems Maternal Grandfather     No Known Problems Paternal Grandmother     No Known Problems Paternal Grandfather     Amblyopia Neg Hx     Blindness Neg Hx     Cancer Neg Hx     Glaucoma Neg Hx     Macular degeneration Neg Hx     Retinal detachment Neg Hx     Strabismus Neg Hx     Stroke Neg Hx     Thyroid disease Neg Hx      Social History     Tobacco Use    Smoking status: Never Smoker    Smokeless tobacco: Never Used   Substance Use Topics    Alcohol use: No    Drug use: No        Review of Systems   Constitutional: Negative for chills, diaphoresis and fever.   HENT: Negative for ear pain and sore throat.     Eyes: Negative for photophobia and discharge.   Respiratory: Negative for shortness of breath and wheezing.    Cardiovascular: Negative for chest pain and palpitations.   Gastrointestinal: Negative for abdominal pain, constipation, diarrhea, nausea and vomiting.   Genitourinary: Negative for dysuria and hematuria.   Musculoskeletal: Negative for back pain and myalgias.   Skin: Negative for itching and rash.   Neurological: Negative for dizziness, sensory change, focal weakness, weakness and headaches.   All other systems reviewed and are negative.    OBJECTIVE:     Vitals:    10/24/19 0842   BP: 126/72   Pulse: 63   Temp: 97.6 °F (36.4 °C)     Body mass index is 29.29 kg/m².    Physical Exam   Constitutional: She is oriented to person, place, and time and well-developed, well-nourished, and in no distress. No distress.   HENT:   Head: Normocephalic and atraumatic.   Right Ear: Tympanic membrane is not perforated, not erythematous and not bulging. No hemotympanum.   Left Ear: Tympanic membrane is not perforated, not erythematous and not bulging. No hemotympanum.   Mouth/Throat: Oropharynx is clear and moist. No oropharyngeal exudate.   Eyes: Pupils are equal, round, and reactive to light. Conjunctivae and EOM are normal.   Neck: No thyromegaly present.   Cardiovascular: Normal rate, regular rhythm and normal heart sounds. Exam reveals no gallop and no friction rub.   No murmur heard.  Pulmonary/Chest: Effort normal and breath sounds normal. No respiratory distress. She has no wheezes. She has no rales.   Abdominal: Soft. Bowel sounds are normal. She exhibits no distension. There is no tenderness. There is no rebound and no guarding.   Musculoskeletal: Normal range of motion. She exhibits no edema or tenderness.   Lymphadenopathy:     She has no cervical adenopathy.   Neurological: She is alert and oriented to person, place, and time.   Skin: Skin is warm. No rash noted. She is not diaphoretic. No erythema.        Old records were reviewed. Labs and/or images were independently reviewed.    ASSESSMENT     1. Routine physical examination    2. Right sided sciatica    3. Osteoarthritis of right knee, unspecified osteoarthritis type    4. Viral URI    5. Itching    6. Abnormal finding of blood chemistry, unspecified     7. Abnormal findings on diagnostic imaging of other specified body structures     8. Encounter for screening mammogram for breast cancer        PLAN:     Routine physical examination  -     CBC auto differential; Future  -     Comprehensive metabolic panel; Future  -     Hemoglobin A1c; Future  -     Lipid panel; Future  -     TSH; Future  -     T4, free; Future  -     We briefly discussed diet, exercise, and routine preventive exams. All questions and comments addressed.    Itching  -     hydrOXYzine HCl (ATARAX) 25 MG tablet; Take 1 tablet (25 mg total) by mouth 3 (three) times daily as needed for Itching.  Dispense: 60 tablet; Refill: 0    Encounter for screening mammogram for breast cancer  -     Mammo Digital Screening Bilat; Future; Expected date: 10/24/2019      RTC BHARAGV Gotti MD  10/24/2019 8:55 AM      Additional Evaluation & Management issues:    In addition to today's Annual Physical, patient has other medical issues that need to be addressed, as well as their associated prescription management that is separate from today's physical, as documented separately below.       HPI  Pt c/o right lower back pain with radiation down right leg. Started couple months ago. Rates pain as moderate, and seems to be getting worst. Not taking anything for it. No falls/trauma. Also c/o cough, congestion, sputum production for the past week. Onset was sudden. No fevers, chills.     PSH - unremarkable    ROS  + R sciatica  + back pain  + cough, congestion, sputum production.  - fevers, chills, SOB      P/E  PULM: CTA b/l. No wheezes, crackles, rhonchi.  + paraspinal muscle tenderness, slight tenderness to R  piriformis muscle  -  staright leg raise test.     Viral URI  -     fluticasone propionate (FLONASE) 50 mcg/actuation nasal spray; 1 spray (50 mcg total) by Each Nostril route once daily.  Dispense: 1 Bottle; Refill: 5  -     benzonatate (TESSALON) 100 MG capsule; Take 1 capsule (100 mg total) by mouth 3 (three) times daily as needed for Cough.  Dispense: 30 capsule; Refill: 0  -     OK to take tylenol as needed PRN fever. Take mucinex and or claritin to help decrease congestion. Educated patient to drink plenty of fluids and to take vitamin C to help boost immune system. Instructed patient to call or RTC if symptoms persist or worsen.    Right sided sciatica/Osteoarthritis of right knee, unspecified osteoarthritis type  -     naproxen (NAPROSYN) 500 MG tablet; Take 1 tablet (500 mg total) by mouth 2 (two) times daily as needed.  Dispense: 60 tablet; Refill: 0  -     Patient counseled on good posture and stretching exercises. Continue to place ice packs on affected areas 3-4 times daily. Take medications as prescribed. Instructed patient to call MD if symptoms persist or worsen.      RTC PRN.

## 2019-10-31 ENCOUNTER — PATIENT OUTREACH (OUTPATIENT)
Dept: ADMINISTRATIVE | Facility: HOSPITAL | Age: 64
End: 2019-10-31

## 2019-12-02 ENCOUNTER — TELEPHONE (OUTPATIENT)
Dept: ADMINISTRATIVE | Facility: OTHER | Age: 64
End: 2019-12-02

## 2019-12-02 ENCOUNTER — PATIENT OUTREACH (OUTPATIENT)
Dept: ADMINISTRATIVE | Facility: HOSPITAL | Age: 64
End: 2019-12-02

## 2019-12-02 DIAGNOSIS — Z12.31 ENCOUNTER FOR SCREENING MAMMOGRAM FOR BREAST CANCER: Primary | ICD-10-CM

## 2019-12-02 NOTE — TELEPHONE ENCOUNTER
Spoke with patient.  She agrees to do FitKit. Message sent to [facility] to mail new kit to patient

## 2019-12-04 ENCOUNTER — TELEPHONE (OUTPATIENT)
Dept: FAMILY MEDICINE | Facility: CLINIC | Age: 64
End: 2019-12-04

## 2019-12-04 DIAGNOSIS — Z12.11 ENCOUNTER FOR FIT (FECAL IMMUNOCHEMICAL TEST) SCREENING: Primary | ICD-10-CM

## 2019-12-04 NOTE — TELEPHONE ENCOUNTER
States that the fit kit states someone else name and  on it. Says the name is on the evenlope but the label and tube it has someone else name. States that she is suppose to come tomorrow 2019. Patient states that she would bring in the kit tomorrow when she comes to  her kit.

## 2019-12-04 NOTE — TELEPHONE ENCOUNTER
----- Message from Aileen Oro sent at 12/4/2019  2:12 PM CST -----  Contact: Self  Type: Patient Call Back    Who called:Bossman    What is the request in detail:Patient called to notify office that the wrong info was on the kit mailed to her home. Please call to advise    Can the clinic reply by [facility]?    Would the patient rather a call back or a response via [facility]? Call    Best call back number:[phone]

## 2019-12-05 ENCOUNTER — OFFICE VISIT (OUTPATIENT)
Dept: FAMILY MEDICINE | Facility: CLINIC | Age: 64
End: 2019-12-05
Payer: MEDICARE

## 2019-12-05 ENCOUNTER — HOSPITAL ENCOUNTER (OUTPATIENT)
Dept: RADIOLOGY | Facility: HOSPITAL | Age: 64
Discharge: HOME | End: 2019-12-05
Attending: FAMILY MEDICINE
Payer: MEDICARE

## 2019-12-05 VITALS
SYSTOLIC BLOOD PRESSURE: 130 MMHG | OXYGEN SATURATION: 99 % | WEIGHT: 175.69 LBS | DIASTOLIC BLOOD PRESSURE: 64 MMHG | TEMPERATURE: 98 F | HEART RATE: 60 BPM | BODY MASS INDEX: 29.99 KG/M2 | HEIGHT: 64 IN

## 2019-12-05 DIAGNOSIS — J06.9 VIRAL URI: Primary | ICD-10-CM

## 2019-12-05 DIAGNOSIS — J06.9 VIRAL URI: ICD-10-CM

## 2019-12-05 DIAGNOSIS — Z12.31 ENCOUNTER FOR SCREENING MAMMOGRAM FOR BREAST CANCER: ICD-10-CM

## 2019-12-05 DIAGNOSIS — L29.9 ITCHING: ICD-10-CM

## 2019-12-05 DIAGNOSIS — J45.30 ASTHMA, CHRONIC, MILD PERSISTENT, UNCOMPLICATED: ICD-10-CM

## 2019-12-05 DIAGNOSIS — R07.89 ATYPICAL CHEST PAIN: ICD-10-CM

## 2019-12-05 PROCEDURE — 3008F BODY MASS INDEX DOCD: CPT | Mod: CPTII,S$GLB,, | Performed by: FAMILY MEDICINE

## 2019-12-05 PROCEDURE — 77067 SCR MAMMO BI INCL CAD: CPT | Mod: TC,PO

## 2019-12-05 PROCEDURE — 77067 SCR MAMMO BI INCL CAD: CPT | Mod: 26,,, | Performed by: RADIOLOGY

## 2019-12-05 PROCEDURE — 3008F PR BODY MASS INDEX (BMI) DOCUMENTED: ICD-10-PCS | Mod: CPTII,S$GLB,, | Performed by: FAMILY MEDICINE

## 2019-12-05 PROCEDURE — 99214 PR OFFICE/OUTPT VISIT, EST, LEVL IV, 30-39 MIN: ICD-10-PCS | Mod: S$GLB,,, | Performed by: FAMILY MEDICINE

## 2019-12-05 PROCEDURE — 99214 OFFICE O/P EST MOD 30 MIN: CPT | Mod: S$GLB,,, | Performed by: FAMILY MEDICINE

## 2019-12-05 PROCEDURE — 77063 BREAST TOMOSYNTHESIS BI: CPT | Mod: 26,,, | Performed by: RADIOLOGY

## 2019-12-05 PROCEDURE — 71046 XR CHEST PA AND LATERAL: ICD-10-PCS | Mod: 26,,, | Performed by: RADIOLOGY

## 2019-12-05 PROCEDURE — 71046 X-RAY EXAM CHEST 2 VIEWS: CPT | Mod: 26,,, | Performed by: RADIOLOGY

## 2019-12-05 PROCEDURE — 77063 MAMMO DIGITAL SCREENING BILAT WITH TOMOSYNTHESIS_CAD: ICD-10-PCS | Mod: 26,,, | Performed by: RADIOLOGY

## 2019-12-05 PROCEDURE — 99499 UNLISTED E&M SERVICE: CPT | Mod: S$GLB,,, | Performed by: FAMILY MEDICINE

## 2019-12-05 PROCEDURE — 99999 PR PBB SHADOW E&M-EST. PATIENT-LVL IV: CPT | Mod: PBBFAC,,, | Performed by: FAMILY MEDICINE

## 2019-12-05 PROCEDURE — 71046 X-RAY EXAM CHEST 2 VIEWS: CPT | Mod: TC,FY,PO

## 2019-12-05 PROCEDURE — 77067 MAMMO DIGITAL SCREENING BILAT WITH TOMOSYNTHESIS_CAD: ICD-10-PCS | Mod: 26,,, | Performed by: RADIOLOGY

## 2019-12-05 PROCEDURE — 99499 RISK ADDL DX/OHS AUDIT: ICD-10-PCS | Mod: S$GLB,,, | Performed by: FAMILY MEDICINE

## 2019-12-05 PROCEDURE — 99999 PR PBB SHADOW E&M-EST. PATIENT-LVL IV: ICD-10-PCS | Mod: PBBFAC,,, | Performed by: FAMILY MEDICINE

## 2019-12-05 RX ORDER — ALBUTEROL SULFATE 90 UG/1
2 AEROSOL, METERED RESPIRATORY (INHALATION) EVERY 6 HOURS PRN
Qty: 3 INHALER | Refills: 5 | Status: SHIPPED | OUTPATIENT
Start: 2019-12-05 | End: 2021-08-18 | Stop reason: SDUPTHER

## 2019-12-05 RX ORDER — FLUTICASONE PROPIONATE 50 MCG
1 SPRAY, SUSPENSION (ML) NASAL DAILY
Qty: 1 BOTTLE | Refills: 5 | Status: SHIPPED | OUTPATIENT
Start: 2019-12-05 | End: 2021-08-18 | Stop reason: SDUPTHER

## 2019-12-05 RX ORDER — HYDROXYZINE HYDROCHLORIDE 25 MG/1
25 TABLET, FILM COATED ORAL 3 TIMES DAILY PRN
Qty: 60 TABLET | Refills: 0 | Status: SHIPPED | OUTPATIENT
Start: 2019-12-05 | End: 2020-04-08 | Stop reason: SDUPTHER

## 2019-12-05 RX ORDER — BENZONATATE 100 MG/1
100 CAPSULE ORAL 3 TIMES DAILY PRN
Qty: 30 CAPSULE | Refills: 0 | Status: SHIPPED | OUTPATIENT
Start: 2019-12-05 | End: 2020-07-20

## 2019-12-05 NOTE — PROGRESS NOTES
[facility]  Progress Note    SUBJECTIVE:     Chief Complaint   Patient presents with    Breast Pain    Chest Pain    Cough       HPI   Bossman Kumari  is a 64 y.o. female here with multiple complaints.  1. Cough, congestion for the past week. Cough is productive of yellow sputum. No fevers, chills, SOB. Tried otc meds without relief.   2. Occasional chest pain. Episodes occur sporadically, which can be assocaited with some SOB at times. Can occur at rest, and last for 5 minutes. Nothing makes it better or worst. No chest pain, SOB currently.     Review of patient's allergies indicates:  No Known Allergies    Past Medical History:   Diagnosis Date    Acute cholecystitis 12/10/2015    Cholelithiasis 12/10/2015    DDD (degenerative disc disease), lumbar     Family history of diabetes mellitus type II 11/11/2015    GERD (gastroesophageal reflux disease)     Nuclear sclerosis of both eyes 6/13/2019    Unspecified asthma, with exacerbation     Vaginal delivery     x4     Past Surgical History:   Procedure Laterality Date    CHOLECYSTECTOMY      TOTAL ABDOMINAL HYSTERECTOMY  1981     Family History   Problem Relation Age of Onset    Hypertension Mother     Diabetes Mother     Cataracts Mother     Hypertension Father     Cataracts Father     No Known Problems Sister     No Known Problems Brother     No Known Problems Maternal Aunt     No Known Problems Maternal Uncle     No Known Problems Paternal Aunt     No Known Problems Paternal Uncle     No Known Problems Maternal Grandmother     No Known Problems Maternal Grandfather     No Known Problems Paternal Grandmother     No Known Problems Paternal Grandfather     Amblyopia Neg Hx     Blindness Neg Hx     Cancer Neg Hx     Glaucoma Neg Hx     Macular degeneration Neg Hx     Retinal detachment Neg Hx     Strabismus Neg Hx     Stroke Neg Hx     Thyroid disease Neg Hx      Social History     Tobacco Use    Smoking status: Never  Smoker    Smokeless tobacco: Never Used   Substance Use Topics    Alcohol use: No    Drug use: No        Review of Systems   Constitutional: Negative for chills, fever and malaise/fatigue.   HENT: Positive for congestion.    Respiratory: Positive for cough and sputum production. Negative for shortness of breath and wheezing.    Cardiovascular: Positive for chest pain (occasional).   Gastrointestinal: Negative.  Negative for abdominal pain, nausea and vomiting.   Genitourinary: Negative.    Musculoskeletal: Negative for myalgias and neck pain.   Neurological: Negative for weakness and headaches.   All other systems reviewed and are negative.    OBJECTIVE:     Vitals:    12/05/19 0842   BP: 130/64   Pulse: 60   Temp: 97.6 °F (36.4 °C)     Body mass index is 30.16 kg/m².    Physical Exam   Constitutional: She is oriented to person, place, and time and well-developed, well-nourished, and in no distress. No distress.   HENT:   Head: Normocephalic and atraumatic.   Right Ear: External ear normal. Tympanic membrane is not perforated, not erythematous, not retracted and not bulging. No hemotympanum.   Left Ear: External ear normal. Tympanic membrane is not perforated, not erythematous, not retracted and not bulging. No hemotympanum.   Nose: Nose normal.   Mouth/Throat: Oropharynx is clear and moist. No oropharyngeal exudate.   Eyes: Conjunctivae and EOM are normal.   Cardiovascular: Normal rate, regular rhythm and normal heart sounds. Exam reveals no gallop and no friction rub.   No murmur heard.  Pulmonary/Chest: Effort normal and breath sounds normal. No respiratory distress. She has no wheezes. She has no rales. She exhibits no tenderness.   Abdominal: Soft. Bowel sounds are normal. She exhibits no distension. There is no tenderness. There is no rebound.   Lymphadenopathy:     She has no cervical adenopathy.   Neurological: She is alert and oriented to person, place, and time.   Skin: Skin is warm. She is not  diaphoretic.       Old records were reviewed. Labs and/or images were independently reviewed.    ASSESSMENT     1. Viral URI    2. Atypical chest pain    3. Asthma, chronic, mild persistent, uncomplicated    4. Itching    5. Encounter for screening mammogram for breast cancer        PLAN:     Viral URI  -     X-Ray Chest PA And Lateral; Future; Expected date: 12/05/2019  -     benzonatate (TESSALON) 100 MG capsule; Take 1 capsule (100 mg total) by mouth 3 (three) times daily as needed for Cough.  Dispense: 30 capsule; Refill: 0  -     fluticasone propionate (FLONASE) 50 mcg/actuation nasal spray; 1 spray (50 mcg total) by Each Nostril route once daily.  Dispense: 1 Bottle; Refill: 5  -     OK to take tylenol as needed PRN fever. Take mucinex and or claritin to help decrease congestion. Educated patient to drink plenty of fluids and to take vitamin C to help boost immune system. Instructed patient to call or RTC if symptoms persist or worsen.    Atypical chest pain  -     Ambulatory referral to Cardiology for further evaluation and treatment.    Asthma, chronic, mild persistent, uncomplicated  -     albuterol (PROAIR HFA) 90 mcg/actuation inhaler; Inhale 2 puffs into the lungs every 6 (six) hours as needed.  Dispense: 3 Inhaler; Refill: 5    Itching  -     hydrOXYzine HCl (ATARAX) 25 MG tablet; Take 1 tablet (25 mg total) by mouth 3 (three) times daily as needed for Itching.  Dispense: 60 tablet; Refill: 0    Encounter for screening mammogram for breast cancer  -     Mammo Digital Screening [place]; Future; Expected date: 12/05/2019      RTC PRN    Dave Gotti MD  12/05/2019 8:59 AM

## 2019-12-11 ENCOUNTER — PATIENT OUTREACH (OUTPATIENT)
Dept: ADMINISTRATIVE | Facility: OTHER | Age: 64
End: 2019-12-11

## 2019-12-26 ENCOUNTER — TELEPHONE (OUTPATIENT)
Dept: FAMILY MEDICINE | Facility: CLINIC | Age: 64
End: 2019-12-26

## 2019-12-26 NOTE — TELEPHONE ENCOUNTER
----- Message from Ludy Salgado sent at 12/26/2019  9:41 AM CST -----  Contact: Self   Type: PINK DOT    Who Called: Self     Symptoms (please be specific): Fever, Chills, and Body aches    How long has patient had these symptoms:  Monday   Preferred pharmacy and phone number:    [facility] "[facility] #[id number] - [place]  [place] [address]  Phone: [phone] Fax: [phone]      Would the patient rather a call back or a response via [facility]?  Call   Best Call Back Number:  [phone]      Additional Information:

## 2020-01-07 ENCOUNTER — TELEPHONE (OUTPATIENT)
Dept: FAMILY MEDICINE | Facility: CLINIC | Age: 65
End: 2020-01-07

## 2020-01-07 NOTE — TELEPHONE ENCOUNTER
Spoke with the pt and she needs a form filled out today.  I told the pt, that I will have to sent a message to the doctor to see if the paper could be fill out today, by 2pm. Pt said she will change her doctor, that every time she calls here it's always something.  Pt states she will start filling out the survery's and let [facility] know how unpleased she is.  Pt hung up the phone and she said she won't waste her breath of this office again.Patient verbalized understandings.

## 2020-01-07 NOTE — TELEPHONE ENCOUNTER
----- Message from Paula Wheatley sent at 1/7/2020  7:51 AM CST -----  Contact: VANDANA MATTHEWS [[id number]]  Name of Who is Calling: VANDANA MATTHEWS [[id number]]      What is the request in detail:  Patient called requesting to be seen today to discuss recent test results. I did attempt to schedule per patient's request but was unsuccessful.  Please give a call back at your earliest convenience and further advise.    THANKS!      Reply by [facility]: NO      Call Back: VANDANA MATTHEWS / PH# [phone]

## 2020-02-06 ENCOUNTER — PATIENT OUTREACH (OUTPATIENT)
Dept: ADMINISTRATIVE | Facility: OTHER | Age: 65
End: 2020-02-06

## 2020-02-06 NOTE — PROGRESS NOTES
Chart reviewed.   Requested updates from Care Everywhere.  Immunizations not in links.  HM updated.

## 2020-04-08 DIAGNOSIS — L29.9 ITCHING: ICD-10-CM

## 2020-04-08 RX ORDER — HYDROXYZINE HYDROCHLORIDE 25 MG/1
25 TABLET, FILM COATED ORAL 3 TIMES DAILY PRN
Qty: 60 TABLET | Refills: 0 | Status: SHIPPED | OUTPATIENT
Start: 2020-04-08 | End: 2020-07-20

## 2020-04-08 NOTE — TELEPHONE ENCOUNTER
----- Message from Braennegissel King sent at 4/8/2020 11:09 AM CDT -----  Contact: Self [phone]  Type: RX Refill Request    Who Called: Self    Have you contacted your pharmacy:    Refill or New Rx:refill    RX Name and Strength: medication for itching. Pt does not know the name of the medication    Preferred Pharmacy with phone number:  [facility] #[id number] - [place]  [place] [address]  Phone: [phone] Fax: [phone]    Local or Mail Order:Local    Would the patient rather a call back or a response via [facility]? Call back    Best Call Back Number: [phone]

## 2020-04-13 ENCOUNTER — TELEPHONE (OUTPATIENT)
Dept: OPHTHALMOLOGY | Facility: CLINIC | Age: 65
End: 2020-04-13

## 2020-04-13 NOTE — TELEPHONE ENCOUNTER
----- Message from Aliyah Bob sent at 4/13/2020  2:36 PM CDT -----  Contact: Bossman Huang calling to schedule for Ptosis eval with Augusto.  I offered what we have which is in August but she states she can't wait that long because it is really affecting her vision and reading. She can be reached at [phone]

## 2020-05-15 ENCOUNTER — TELEPHONE (OUTPATIENT)
Dept: OPHTHALMOLOGY | Facility: CLINIC | Age: 65
End: 2020-05-15

## 2020-05-15 NOTE — TELEPHONE ENCOUNTER
----- Message from Danyelle Bledsoe MA sent at 5/15/2020  8:24 AM CDT -----  Contact: Bossman      ----- Message -----  From: Aliyah Bob  Sent: 5/15/2020   8:22 AM CDT  To: [facility] Staff    Pt calling to reschedule her Ptosis eval that was canceled in February but I am unable to reschedule it for some reason from the past. She can be reached at [phone]

## 2020-06-17 ENCOUNTER — NURSE TRIAGE (OUTPATIENT)
Dept: ADMINISTRATIVE | Facility: CLINIC | Age: 65
End: 2020-06-17

## 2020-06-17 ENCOUNTER — TELEPHONE (OUTPATIENT)
Dept: OTOLARYNGOLOGY | Facility: CLINIC | Age: 65
End: 2020-06-17

## 2020-06-17 NOTE — TELEPHONE ENCOUNTER
You can send her the following in a letter. Can you also please copy the MA at the med onc and rad onc office so that they can reach out to her to discuss this issue with her . Thanks    In regard to Mr. Kumari's cancer, he needs to speak to his treating medical and radiation oncologists to discuss the status of his cancer. I performed his biopsy of the throat. He also has a spot on his lung that was biopsied by another doctor. In some cases, cancer in the lung can start on its own and in other cases it can spread from the throat to the lung.  Most commonly, the throat cancer will spread first to lymph nodes in the neck and then to the lung but in very rare instances it can spread directly to the lung from the throat. Sometimes (for example in a patient that has a history of smoking), you can have two cancers that are discovered at the same time. Please discuss with the medical oncologist and radiation oncologist about their opinion of the answer to these questions. They can help you with interpreting the pathology reports (which you can request reports from medical records). Although I did the biopsy of his throat and discussed his case at tumor board, I am not treating his cancer and it would be better for communications to come from the doctors that are treating his cancer.

## 2020-06-17 NOTE — TELEPHONE ENCOUNTER
Patient's wife called back stating that she needs a letter stating the Diagnosis of the Throat Cancer and the Lung Cancer , the names of the Cancer and if they are two separtate Cancers.

## 2020-06-17 NOTE — TELEPHONE ENCOUNTER
----- Message from Evelia Rebolledo sent at 6/17/2020  3:59 PM CDT -----  Type: Patient Call Back    Who called: pt     What is the request in detail: pt requesting to speak to Ms. Guzman. Pt declined to provide reason for the call.     Can the clinic reply by [facility]? No     Would the patient rather a call back or a response via [facility]? Call back     Best call back number: [phone]    Additional Information:

## 2020-06-17 NOTE — TELEPHONE ENCOUNTER
Spoke with patient she states that she had fish on yesterday and she noticed that she developed some welps on her arms and legs.  Also states that she has been itching.  Reports that she took a benadryl.  Patient denies having any difficulty breathing or difficulty swallowing.  Advised patient to be seen within 24 hours. Advised patent to go to Urgent Care if she is unable to get appointment in office.  Declined virtual visit information. Patient verbalized understanding of the above.      Reason for Disposition   [1] MODERATE-SEVERE widespread itching (i.e., interferes with sleep, normal activities or school) AND [2] not improved after 24 hours of itching Care Advice    Additional Information   Negative: [1] Life-threatening reaction (anaphylaxis) in the past to similar substance (e.g., food, insect bite/sting, chemical, etc.) AND [2] < 2 hours since exposure   Negative: Difficulty breathing or wheezing   Negative: [1] Difficulty swallowing or slurred speech AND [2] sudden onset   Negative: Sounds like a life-threatening emergency to the triager   Negative: Patient sounds very sick or weak to the triager    Protocols used: ITCHING - WIDESPREAD-A-AH

## 2020-06-18 ENCOUNTER — LAB VISIT (OUTPATIENT)
Dept: LAB | Facility: HOSPITAL | Age: 65
End: 2020-06-18
Attending: INTERNAL MEDICINE
Payer: MEDICARE

## 2020-06-18 ENCOUNTER — OFFICE VISIT (OUTPATIENT)
Dept: INTERNAL MEDICINE | Facility: CLINIC | Age: 65
End: 2020-06-18
Payer: MEDICARE

## 2020-06-18 VITALS
OXYGEN SATURATION: 98 % | SYSTOLIC BLOOD PRESSURE: 128 MMHG | DIASTOLIC BLOOD PRESSURE: 80 MMHG | HEART RATE: 78 BPM | HEIGHT: 64 IN | BODY MASS INDEX: 31.92 KG/M2 | WEIGHT: 187 LBS

## 2020-06-18 DIAGNOSIS — L50.8 RECURRENT URTICARIA: ICD-10-CM

## 2020-06-18 DIAGNOSIS — L50.8 RECURRENT URTICARIA: Primary | ICD-10-CM

## 2020-06-18 LAB
ANION GAP SERPL CALC-SCNC: 7 MMOL/L (ref 8–16)
BASOPHILS # BLD AUTO: 0.05 K/UL (ref 0–0.2)
BASOPHILS NFR BLD: 0.8 % (ref 0–1.9)
BUN SERPL-MCNC: 13 MG/DL (ref 8–23)
CALCIUM SERPL-MCNC: 9.4 MG/DL (ref 8.7–10.5)
CHLORIDE SERPL-SCNC: 108 MMOL/L (ref 95–110)
CO2 SERPL-SCNC: 24 MMOL/L (ref 23–29)
CREAT SERPL-MCNC: 0.9 MG/DL (ref 0.5–1.4)
CRP SERPL-MCNC: 7.8 MG/L (ref 0–8.2)
DIFFERENTIAL METHOD: ABNORMAL
EOSINOPHIL # BLD AUTO: 0.3 K/UL (ref 0–0.5)
EOSINOPHIL NFR BLD: 4.4 % (ref 0–8)
ERYTHROCYTE [DISTWIDTH] IN BLOOD BY AUTOMATED COUNT: 13.8 % (ref 11.5–14.5)
ERYTHROCYTE [SEDIMENTATION RATE] IN BLOOD BY WESTERGREN METHOD: 25 MM/HR (ref 0–36)
EST. GFR  (AFRICAN AMERICAN): >60 ML/MIN/1.73 M^2
EST. GFR  (NON AFRICAN AMERICAN): >60 ML/MIN/1.73 M^2
GLUCOSE SERPL-MCNC: 82 MG/DL (ref 70–110)
HCT VFR BLD AUTO: 43.1 % (ref 37–48.5)
HGB BLD-MCNC: 13.4 G/DL (ref 12–16)
IMM GRANULOCYTES # BLD AUTO: 0 K/UL (ref 0–0.04)
IMM GRANULOCYTES NFR BLD AUTO: 0 % (ref 0–0.5)
LYMPHOCYTES # BLD AUTO: 2.2 K/UL (ref 1–4.8)
LYMPHOCYTES NFR BLD: 36.4 % (ref 18–48)
MCH RBC QN AUTO: 29.6 PG (ref 27–31)
MCHC RBC AUTO-ENTMCNC: 31.1 G/DL (ref 32–36)
MCV RBC AUTO: 95 FL (ref 82–98)
MONOCYTES # BLD AUTO: 0.6 K/UL (ref 0.3–1)
MONOCYTES NFR BLD: 10.3 % (ref 4–15)
NEUTROPHILS # BLD AUTO: 2.9 K/UL (ref 1.8–7.7)
NEUTROPHILS NFR BLD: 48.1 % (ref 38–73)
NRBC BLD-RTO: 0 /100 WBC
PLATELET # BLD AUTO: 255 K/UL (ref 150–350)
PMV BLD AUTO: 12.6 FL (ref 9.2–12.9)
POTASSIUM SERPL-SCNC: 4.1 MMOL/L (ref 3.5–5.1)
RBC # BLD AUTO: 4.53 M/UL (ref 4–5.4)
SODIUM SERPL-SCNC: 139 MMOL/L (ref 136–145)
TSH SERPL DL<=0.005 MIU/L-ACNC: 0.84 UIU/ML (ref 0.4–4)
WBC # BLD AUTO: 5.94 K/UL (ref 3.9–12.7)

## 2020-06-18 PROCEDURE — 86140 C-REACTIVE PROTEIN: CPT | Mod: HCNC

## 2020-06-18 PROCEDURE — 36415 COLL VENOUS BLD VENIPUNCTURE: CPT | Mod: HCNC

## 2020-06-18 PROCEDURE — 80048 BASIC METABOLIC PNL TOTAL CA: CPT | Mod: HCNC

## 2020-06-18 PROCEDURE — 84443 ASSAY THYROID STIM HORMONE: CPT | Mod: GA,HCNC

## 2020-06-18 PROCEDURE — 3008F PR BODY MASS INDEX (BMI) DOCUMENTED: ICD-10-PCS | Mod: HCNC,CPTII,S$GLB, | Performed by: INTERNAL MEDICINE

## 2020-06-18 PROCEDURE — 3008F BODY MASS INDEX DOCD: CPT | Mod: HCNC,CPTII,S$GLB, | Performed by: INTERNAL MEDICINE

## 2020-06-18 PROCEDURE — 86038 ANTINUCLEAR ANTIBODIES: CPT | Mod: HCNC

## 2020-06-18 PROCEDURE — 99999 PR PBB SHADOW E&M-EST. PATIENT-LVL III: ICD-10-PCS | Mod: PBBFAC,HCNC,, | Performed by: INTERNAL MEDICINE

## 2020-06-18 PROCEDURE — 85652 RBC SED RATE AUTOMATED: CPT | Mod: HCNC

## 2020-06-18 PROCEDURE — 99999 PR PBB SHADOW E&M-EST. PATIENT-LVL III: CPT | Mod: PBBFAC,HCNC,, | Performed by: INTERNAL MEDICINE

## 2020-06-18 PROCEDURE — 85025 COMPLETE CBC W/AUTO DIFF WBC: CPT | Mod: HCNC

## 2020-06-18 PROCEDURE — 99214 OFFICE O/P EST MOD 30 MIN: CPT | Mod: HCNC,S$GLB,, | Performed by: INTERNAL MEDICINE

## 2020-06-18 PROCEDURE — 99214 PR OFFICE/OUTPT VISIT, EST, LEVL IV, 30-39 MIN: ICD-10-PCS | Mod: HCNC,S$GLB,, | Performed by: INTERNAL MEDICINE

## 2020-06-18 RX ORDER — PREDNISONE 20 MG/1
TABLET ORAL
Qty: 12 TABLET | Refills: 0 | Status: SHIPPED | OUTPATIENT
Start: 2020-06-18 | End: 2020-08-25

## 2020-06-18 NOTE — PROGRESS NOTES
64-year-old female    For the past 4 days been noticing various hives in sore was on her body which mainly includes the arm, legs in lower abdomen and back  No associated fever but does have some degree of pruritus  She had a similar reaction couple months ago was a medicine Atarax was sent in  She reports no associated shortness of breath or wheezing ,no chest pain or tightness ,no abdominal pain or cramps ,no diarrhea no hematuria, no arthralgias or joint swelling      Past medical history  GERD  Osteoarthritis  Depression    Medication list    There were good number medications listed on her med card for which most some she is not taking    But it appears that she is on albuterol as needed  Flonase  Pantoprazole  Naprosyn        Examination  Weight 187 lb  Blood pressure 100/62  Lungs clear breath sounds  Heart regular rate and rhythm  Skin  Areas hyperemic/erythematous indurated skin spots, mainly noted on left thigh left lower leg a couple on the right lower leg, couple on both forearms, and 1 on her left 3rd finger    Impression  Recurring urticaria    Plan  CBC basic metabolic profile TSH IG inflammatory markers KOKO    she expressed a concern of diabetes  Prednisone taper over the next 8 days and okay to take Benadryl

## 2020-06-19 ENCOUNTER — TELEPHONE (OUTPATIENT)
Dept: INTERNAL MEDICINE | Facility: CLINIC | Age: 65
End: 2020-06-19

## 2020-06-19 LAB — ANA SER QL IF: NORMAL

## 2020-06-19 NOTE — PROGRESS NOTES
The lab studies look fine    Continue as instructed    If no improvement would need to consider referral to allergy or dermatology

## 2020-06-24 DIAGNOSIS — L50.8 RECURRENT URTICARIA: Primary | ICD-10-CM

## 2020-06-25 ENCOUNTER — TELEPHONE (OUTPATIENT)
Dept: FAMILY MEDICINE | Facility: CLINIC | Age: 65
End: 2020-06-25

## 2020-06-25 NOTE — TELEPHONE ENCOUNTER
----- Message from Janel Caro sent at 6/25/2020  9:07 AM CDT -----  Regarding: bumps on arm  Name of Caller pt  Reason for Visit/Symptoms requesting to be seen today for bumps on arms. Call pt  Best Contact Number or Confirm if Mychart Preferred [phone] (home)     Preferred Date/Time of Appointment today  Interested in Virtual Visit (yes/no)no  Additional Information

## 2020-06-29 ENCOUNTER — TELEPHONE (OUTPATIENT)
Dept: FAMILY MEDICINE | Facility: CLINIC | Age: 65
End: 2020-06-29

## 2020-06-29 NOTE — TELEPHONE ENCOUNTER
Pt is aware there is no schedule allergy appt must called referring doctor to get more information.

## 2020-06-29 NOTE — TELEPHONE ENCOUNTER
----- Message from Aileen Alford sent at 6/29/2020  7:46 AM CDT -----    Name of Who is Calling:VANDANA MATTHEWS [[id number]]    What is the request in detail: Pt would like a call back regarding allergy appointment Please contact to further discuss and advise     Can the clinic reply by [facility]: no    What Number to Call Back if not in [facility]: [phone]

## 2020-07-05 ENCOUNTER — HOSPITAL ENCOUNTER (EMERGENCY)
Facility: HOSPITAL | Age: 65
Discharge: HOME | End: 2020-07-05
Attending: EMERGENCY MEDICINE
Payer: MEDICARE

## 2020-07-05 VITALS
BODY MASS INDEX: 28.85 KG/M2 | WEIGHT: 169 LBS | OXYGEN SATURATION: 100 % | DIASTOLIC BLOOD PRESSURE: 65 MMHG | TEMPERATURE: 98 F | HEIGHT: 64 IN | SYSTOLIC BLOOD PRESSURE: 124 MMHG | RESPIRATION RATE: 16 BRPM | HEART RATE: 62 BPM

## 2020-07-05 DIAGNOSIS — L29.9 ITCHING: Primary | ICD-10-CM

## 2020-07-05 DIAGNOSIS — R21 RASH AND NONSPECIFIC SKIN ERUPTION: ICD-10-CM

## 2020-07-05 LAB
ALBUMIN SERPL-MCNC: 3.3 G/DL (ref 3.3–5.5)
ALBUMIN SERPL-MCNC: 3.4 G/DL (ref 3.3–5.5)
ALP SERPL-CCNC: 92 U/L (ref 42–141)
ALP SERPL-CCNC: 93 U/L (ref 42–141)
BILIRUB SERPL-MCNC: 0.4 MG/DL (ref 0.2–1.6)
BILIRUB SERPL-MCNC: 0.4 MG/DL (ref 0.2–1.6)
BUN SERPL-MCNC: 12 MG/DL (ref 7–22)
CALCIUM SERPL-MCNC: 9.6 MG/DL (ref 8–10.3)
CHLORIDE SERPL-SCNC: 109 MMOL/L (ref 98–108)
CREAT SERPL-MCNC: 1.1 MG/DL (ref 0.6–1.2)
GLUCOSE SERPL-MCNC: 109 MG/DL (ref 73–118)
POC ALT (SGPT): 25 U/L (ref 10–47)
POC ALT (SGPT): 25 U/L (ref 10–47)
POC AMYLASE: 52 U/L (ref 14–97)
POC AST (SGOT): 29 U/L (ref 11–38)
POC AST (SGOT): 29 U/L (ref 11–38)
POC GGT: 42 U/L (ref 5–65)
POC TCO2: 27 MMOL/L (ref 18–33)
POTASSIUM BLD-SCNC: 3.8 MMOL/L (ref 3.6–5.1)
PROTEIN, POC: 6.9 G/DL (ref 6.4–8.1)
PROTEIN, POC: 7.2 G/DL (ref 6.4–8.1)
SODIUM BLD-SCNC: 138 MMOL/L (ref 128–145)

## 2020-07-05 PROCEDURE — 99284 EMERGENCY DEPT VISIT MOD MDM: CPT | Mod: 25,HCNC,ER

## 2020-07-05 PROCEDURE — 80053 COMPREHEN METABOLIC PANEL: CPT | Mod: HCNC,ER

## 2020-07-05 PROCEDURE — 63600175 PHARM REV CODE 636 W HCPCS: Mod: HCNC,ER | Performed by: EMERGENCY MEDICINE

## 2020-07-05 PROCEDURE — 85025 COMPLETE CBC W/AUTO DIFF WBC: CPT | Mod: HCNC,ER

## 2020-07-05 PROCEDURE — 82150 ASSAY OF AMYLASE: CPT | Mod: HCNC,ER

## 2020-07-05 PROCEDURE — 96372 THER/PROPH/DIAG INJ SC/IM: CPT | Mod: HCNC,ER

## 2020-07-05 RX ORDER — CETIRIZINE HYDROCHLORIDE 10 MG/1
10 TABLET ORAL DAILY
Qty: 30 TABLET | Refills: 0 | Status: SHIPPED | OUTPATIENT
Start: 2020-07-05 | End: 2020-08-25

## 2020-07-05 RX ORDER — HYDROXYZINE PAMOATE 50 MG/1
50 CAPSULE ORAL EVERY 8 HOURS PRN
Qty: 20 CAPSULE | Refills: 0 | Status: SHIPPED | OUTPATIENT
Start: 2020-07-05 | End: 2022-10-09

## 2020-07-05 RX ORDER — DIPHENHYDRAMINE HYDROCHLORIDE 50 MG/ML
25 INJECTION INTRAMUSCULAR; INTRAVENOUS
Status: COMPLETED | OUTPATIENT
Start: 2020-07-05 | End: 2020-07-05

## 2020-07-05 RX ORDER — DEXAMETHASONE SODIUM PHOSPHATE 4 MG/ML
4 INJECTION, SOLUTION INTRA-ARTICULAR; INTRALESIONAL; INTRAMUSCULAR; INTRAVENOUS; SOFT TISSUE
Status: COMPLETED | OUTPATIENT
Start: 2020-07-05 | End: 2020-07-05

## 2020-07-05 RX ADMIN — DIPHENHYDRAMINE HYDROCHLORIDE 25 MG: 50 INJECTION INTRAMUSCULAR; INTRAVENOUS at 07:07

## 2020-07-05 RX ADMIN — DEXAMETHASONE SODIUM PHOSPHATE 4 MG: 4 INJECTION, SOLUTION INTRAMUSCULAR; INTRAVENOUS at 07:07

## 2020-07-05 NOTE — ED TRIAGE NOTES
Pt is reporting itching all over at night. Pt denies using new body soap, detergent. Pt denies sob, chest pain

## 2020-07-05 NOTE — ED PROVIDER NOTES
Encounter Date: 7/5/2020    SCRIBE #1 NOTE: I, Aileen Conn, am scribing for, and in the presence of,  Dr. Massey. I have scribed the following portions of the note - Other sections scribed: HPI, ROS, PE.       History     Chief Complaint   Patient presents with    Rash     Rash x 1 month, saw PCP, was given prednisone, completed, still complains of itching all over.     Ms. Bossman Kumari is a 64 y.o. female who presents to the ED complaining of generalized body itchiness for 3 weeks with no obvious rash. Reports she notices some intermittent swelling under skin where it itches. No fevers, chills, sob, weight loss, changes in meds, recent illnesses or other complaints. She has completed a recent prednisone taper and is awaiting her appt w allergist.     The history is provided by the patient. No  was used.     Review of patient's allergies indicates:  No Known Allergies  Past Medical History:   Diagnosis Date    Acute cholecystitis 12/10/2015    Cholelithiasis 12/10/2015    DDD (degenerative disc disease), lumbar     Family history of diabetes mellitus type II 11/11/2015    GERD (gastroesophageal reflux disease)     Nuclear sclerosis of both eyes 6/13/2019    Unspecified asthma, with exacerbation     Vaginal delivery     x4     Past Surgical History:   Procedure Laterality Date    CHOLECYSTECTOMY      TOTAL ABDOMINAL HYSTERECTOMY  1981     Family History   Problem Relation Age of Onset    Hypertension Mother     Diabetes Mother     Cataracts Mother     Hypertension Father     Cataracts Father     No Known Problems Sister     No Known Problems Brother     No Known Problems Maternal Aunt     No Known Problems Maternal Uncle     No Known Problems Paternal Aunt     No Known Problems Paternal Uncle     No Known Problems Maternal Grandmother     No Known Problems Maternal Grandfather     No Known Problems Paternal Grandmother     No Known Problems Paternal Grandfather      Amblyopia Neg Hx     Blindness Neg Hx     Cancer Neg Hx     Glaucoma Neg Hx     Macular degeneration Neg Hx     Retinal detachment Neg Hx     Strabismus Neg Hx     Stroke Neg Hx     Thyroid disease Neg Hx      Social History     Tobacco Use    Smoking status: Never Smoker    Smokeless tobacco: Never Used   Substance Use Topics    Alcohol use: No    Drug use: No     Review of Systems   Constitutional: Negative.    Respiratory: Negative.    Cardiovascular: Negative.    Skin: Negative for rash.        Positive generalized body itchiness.   Neurological: Negative.    All other systems reviewed and are negative.      Physical Exam     Initial Vitals [07/05/20 1733]   BP Pulse Resp Temp SpO2   128/75 99 16 98 °F (36.7 °C) 100 %      MAP       --         Physical Exam    Nursing note and vitals reviewed.  Constitutional: She appears well-developed.   HENT:   Head: Normocephalic and atraumatic.   Nose: Nose normal.   Eyes: Conjunctivae and EOM are normal.   Neck: Normal range of motion. Neck supple.   Cardiovascular: Normal rate, regular rhythm, S1 normal, S2 normal and normal heart sounds. Exam reveals no gallop and no friction rub.    No murmur heard.  Pulmonary/Chest: Breath sounds normal. No respiratory distress. She has no wheezes. She has no rhonchi. She has no rales.   Musculoskeletal: Normal range of motion.   Neurological: She is alert and oriented to person, place, and time. GCS score is 15. GCS eye subscore is 4. GCS verbal subscore is 5. GCS motor subscore is 6.   Skin: Skin is warm and dry. Capillary refill takes less than 2 seconds.   Non-specific trace nodules felt under right thigh and behind right upper arm, in area that pt reports itching. No rash. No skin abnormalities noted.    Psychiatric: She has a normal mood and affect. Her behavior is normal.         ED Course   Procedures  Labs Reviewed   POCT CMP - Abnormal; Notable for the following components:       Result Value    POC Chloride 109  (*)     All other components within normal limits   POCT CBC   POCT CMP   POCT LIVER PANEL   POCT LIVER PANEL              Imaging Results    None          Medical Decision Making:   History:   Old Medical Records: I decided to obtain old medical records.  Clinical Tests:   Lab Tests: Ordered and Reviewed  ED Management:  Previous records reviewed.   Labs noted. Cbc, cmp, amylase wnl. Pt given decadron and benadryl IM in ED. Will refer to dermatology. No acute  Etiology for pruritis found. Pt will fu w allergy and derm. Pt has been stable during her time in the ER. Was able to rest for her stay. We discussed return precautions. There is no indication for further emergent intervention or evaluation at this time.               Scribe Attestation:   Scribe #1: I performed the above scribed service and the documentation accurately describes the services I performed. I attest to the accuracy of the note.                          Clinical Impression:     1. Itching    2. Rash and nonspecific skin eruption                ED Disposition Condition    Discharge Stable        ED Prescriptions     Medication Sig Dispense Start Date End Date Auth. Provider    hydrOXYzine pamoate (VISTARIL) 50 MG Cap Take 1 capsule (50 mg total) by mouth every 8 (eight) hours as needed (itching). 20 capsule 7/5/2020  Shivam Massey MD    cetirizine (ZYRTEC) 10 MG tablet Take 1 tablet (10 mg total) by mouth once daily. 30 tablet 7/5/2020 7/5/2021 Shivam Massey MD        Follow-up Information     Follow up With Specialties Details Why Contact Info    [facility]  As needed, If symptoms worsen [address] [facility] [address] [phone]                                     Shivam Massey MD  07/06/20 5490

## 2020-07-06 ENCOUNTER — PES CALL (OUTPATIENT)
Dept: ADMINISTRATIVE | Facility: CLINIC | Age: 65
End: 2020-07-06

## 2020-07-06 NOTE — DISCHARGE INSTRUCTIONS
The exact cause of your itching today is unknown.   Rest. Drink plenty of fluids. Follow up with dermatology as discussed. Return for any new or acute problems or concerns.

## 2020-07-08 ENCOUNTER — TELEPHONE (OUTPATIENT)
Dept: FAMILY MEDICINE | Facility: CLINIC | Age: 65
End: 2020-07-08

## 2020-07-08 DIAGNOSIS — M79.673 PAIN OF FOOT, UNSPECIFIED LATERALITY: Primary | ICD-10-CM

## 2020-07-08 NOTE — TELEPHONE ENCOUNTER
----- Message from Gertrude Almodovar sent at 7/8/2020  2:41 PM CDT -----  Regarding: Self/  [phone]  Type: Patient Call Back    Who called:  Patient    What is the request in detail:  Patient would like staff to give her a call.  She's needing a referral for Podiatry.  Thank you    Would the patient rather a call back or a response via [facility]?   Call back    Best call back number:  [phone]

## 2020-07-10 ENCOUNTER — TELEPHONE (OUTPATIENT)
Dept: FAMILY MEDICINE | Facility: CLINIC | Age: 65
End: 2020-07-10

## 2020-07-10 NOTE — TELEPHONE ENCOUNTER
Pharmacy requesting Alternative for BENZONATATE 100MG      Message from Pharmacy:    Drug not covered by patient plan. The preferred alternative is ALTHDRUGTHERAPHYPREFERREDPRODUCTREQUIRED.

## 2020-07-19 ENCOUNTER — PATIENT OUTREACH (OUTPATIENT)
Dept: ADMINISTRATIVE | Facility: OTHER | Age: 65
End: 2020-07-19

## 2020-07-20 ENCOUNTER — OFFICE VISIT (OUTPATIENT)
Dept: ALLERGY | Facility: CLINIC | Age: 65
End: 2020-07-20
Payer: MEDICARE

## 2020-07-20 ENCOUNTER — LAB VISIT (OUTPATIENT)
Dept: LAB | Facility: HOSPITAL | Age: 65
End: 2020-07-20
Attending: ALLERGY & IMMUNOLOGY
Payer: MEDICARE

## 2020-07-20 VITALS — HEIGHT: 64 IN | WEIGHT: 177.94 LBS | BODY MASS INDEX: 30.38 KG/M2

## 2020-07-20 DIAGNOSIS — L50.8 RECURRENT URTICARIA: ICD-10-CM

## 2020-07-20 DIAGNOSIS — M17.12 OSTEOARTHRITIS OF LEFT KNEE, UNSPECIFIED OSTEOARTHRITIS TYPE: ICD-10-CM

## 2020-07-20 DIAGNOSIS — M16.9 OSTEOARTHRITIS OF HIP, UNSPECIFIED LATERALITY, UNSPECIFIED OSTEOARTHRITIS TYPE: ICD-10-CM

## 2020-07-20 DIAGNOSIS — M19.079 OSTEOARTHRITIS OF ANKLE, UNSPECIFIED LATERALITY, UNSPECIFIED OSTEOARTHRITIS TYPE: Primary | ICD-10-CM

## 2020-07-20 DIAGNOSIS — M51.36 DDD (DEGENERATIVE DISC DISEASE), LUMBAR: ICD-10-CM

## 2020-07-20 LAB
25(OH)D3+25(OH)D2 SERPL-MCNC: 27 NG/ML (ref 30–96)
IGE SERPL-ACNC: 236 IU/ML (ref 0–100)
THYROGLOB AB SERPL IA-ACNC: <4 IU/ML (ref 0–3.9)
THYROPEROXIDASE IGG SERPL-ACNC: <6 IU/ML
TSH SERPL DL<=0.005 MIU/L-ACNC: 0.71 UIU/ML (ref 0.4–4)

## 2020-07-20 PROCEDURE — 84165 PATHOLOGIST INTERPRETATION SPE: ICD-10-PCS | Mod: 26,HCNC,, | Performed by: PATHOLOGY

## 2020-07-20 PROCEDURE — 86800 THYROGLOBULIN ANTIBODY: CPT | Mod: HCNC

## 2020-07-20 PROCEDURE — 99204 OFFICE O/P NEW MOD 45 MIN: CPT | Mod: HCNC,S$GLB,, | Performed by: ALLERGY & IMMUNOLOGY

## 2020-07-20 PROCEDURE — 99999 PR PBB SHADOW E&M-EST. PATIENT-LVL IV: CPT | Mod: PBBFAC,HCNC,, | Performed by: ALLERGY & IMMUNOLOGY

## 2020-07-20 PROCEDURE — 99204 PR OFFICE/OUTPT VISIT, NEW, LEVL IV, 45-59 MIN: ICD-10-PCS | Mod: HCNC,S$GLB,, | Performed by: ALLERGY & IMMUNOLOGY

## 2020-07-20 PROCEDURE — 86003 ALLG SPEC IGE CRUDE XTRC EA: CPT | Mod: HCNC

## 2020-07-20 PROCEDURE — 82306 VITAMIN D 25 HYDROXY: CPT | Mod: DBM,HCNC

## 2020-07-20 PROCEDURE — 84165 PROTEIN E-PHORESIS SERUM: CPT | Mod: HCNC

## 2020-07-20 PROCEDURE — 86003 ALLG SPEC IGE CRUDE XTRC EA: CPT | Mod: 59,HCNC

## 2020-07-20 PROCEDURE — 84165 PROTEIN E-PHORESIS SERUM: CPT | Mod: 26,HCNC,, | Performed by: PATHOLOGY

## 2020-07-20 PROCEDURE — 3008F PR BODY MASS INDEX (BMI) DOCUMENTED: ICD-10-PCS | Mod: HCNC,CPTII,S$GLB, | Performed by: ALLERGY & IMMUNOLOGY

## 2020-07-20 PROCEDURE — 36415 COLL VENOUS BLD VENIPUNCTURE: CPT | Mod: HCNC

## 2020-07-20 PROCEDURE — 84443 ASSAY THYROID STIM HORMONE: CPT | Mod: DBM,HCNC

## 2020-07-20 PROCEDURE — 3008F BODY MASS INDEX DOCD: CPT | Mod: HCNC,CPTII,S$GLB, | Performed by: ALLERGY & IMMUNOLOGY

## 2020-07-20 PROCEDURE — 99999 PR PBB SHADOW E&M-EST. PATIENT-LVL IV: ICD-10-PCS | Mod: PBBFAC,HCNC,, | Performed by: ALLERGY & IMMUNOLOGY

## 2020-07-20 PROCEDURE — 86376 MICROSOMAL ANTIBODY EACH: CPT | Mod: HCNC

## 2020-07-20 PROCEDURE — 82785 ASSAY OF IGE: CPT | Mod: HCNC

## 2020-07-20 PROCEDURE — 83520 IMMUNOASSAY QUANT NOS NONAB: CPT | Mod: HCNC

## 2020-07-20 RX ORDER — CETIRIZINE HYDROCHLORIDE 10 MG/1
10 TABLET ORAL DAILY
Qty: 30 TABLET | Refills: 5 | Status: SHIPPED | OUTPATIENT
Start: 2020-07-20 | End: 2020-08-25

## 2020-07-20 NOTE — LETTER
July 20, 2020      Abram Jimenes MD  [address]  [facility] [address]           [facility]  [address]  [facility] [address]  Phone: [phone]  Fax: [phone]          Patient: Bossman Kumari   MR Number: [id number]   YOB: 1955   Date of Visit: 7/20/2020       Dear Dr. Abram Jimenes:    Thank you for referring Bossman Kumari to me for evaluation. Attached you will find relevant portions of my assessment and plan of care.    If you have questions, please do not hesitate to call me. I look forward to following Bossman Kumari along with you.    Sincerely,    DEBRA Boudreaux III, MD    Enclosure  CC:  No Recipients    If you would like to receive this communication electronically, please contact [email or web address] or [phone] to request more information on [facility] Link access.    For providers and/or their staff who would like to refer a patient to [facility], please contact us through our one-stop-shop provider referral line, [facility], at [phone].    If you feel you have received this communication in error or would no longer like to receive these types of communications, please e-mail [email or web address]

## 2020-07-20 NOTE — PROGRESS NOTES
Requested updates within Care Everywhere.  Patient's chart was reviewed for overdue TIMOTHY topics.  Immunizations reconciled.

## 2020-07-20 NOTE — PROGRESS NOTES
Bossman Kumari is referred by Dr. Abram Jimenes for a consult regarding recurrent urticaria.  She is here alone.    About three weeks ago she started having a recurrent rash that was red, raised, and pruritic.  The lesions usually last about hour before resolving.  She will then develop similar lesions on other areas of her skin.  They do hurt or bruise.    She has not had any angioedema.    There is no association any food, contact, or ingestion.  She denies any thyroid disease.    She has been taking Zyrtec but only one at a time without much improvement.  She has been Atarax and steroids but is not taking.  She was seen in the emergency room on July 5, 2020 and was given IM Benadryl and steroids.  This did help.    She does have some itching and redness of her eyes.  She denies any rhinitis except for dryness particularly right.  She will get nosebleeds if she does not use nasal saline.  She does not use any Flonase.    When she was working at [email or web address] she would developed recurrent bronchitis.  She never had asthma.  She was given albuterol inhaler which she occasionally uses.  She denies any cough, wheezing, or shortness of breath currently.    For ROS, FH, SH please see Allergy and Asthma Questionnaire dated today.    Some relevant pertinent positives:    Review of Systems/PMH:  She does have osteoarthritis of her hips, knees, and ankles.  She has degenerative disc disease lumbar area.  She denies any indigestion or heartburn.    Family History:  Negative for atopic disease.    Home environment:  She has lived in the same house for the past 38 years.  There no water damage.  There no evidence of mold.  There is no carpeting in the bedroom.  There are no pets.    Social History:  She is retired.  She is a nonsmoker.    Physical Examination:  General: Well-developed, well-nourished, no acute distress.  Head: No sinus tenderness.  Eyes: Conjunctivae:  No bulbar or palpebral conjunctival injection.  Ears: EAC's  clear.  TM's clear.  No pre-auricular nodes.  Nose: Nasal Mucosa:  Pink.  Septum: No apparent deviation.  Turbinates:  No significant edema.  Polyps/Mass:  None visible.  Teeth/Gums:  No bleeding noted.  Oropharynx: No exudates.  Neck: Supple without thyromegaly. No cervical lymphadenopathy.    Respiratory/Chest: Effort: Good.  Auscultation:  Clear bilaterally.  Cardiovascular:  No murmur, rubs, or gallop heard.   GI:  Non-tender.  No masses.  No organomegaly.  Extremities:  No cyanosis, clubbing, or edema.  Skin: Good turgor.  No urticaria or angioedema.  Neuro/Psych: Oriented x 3.    Assessment:  1.  Chronic recurrent urticaria, probably idiopathic.  2.  Mild conjunctivitis, consider allergic.  3.  Osteoarthritis.    4.  DDD.    Recommendations:  1.  Laboratory as ordered.  2.  Start taking Zyrtec daily.  She may increase this to four a day.  3.  Take pictures of any further lesions.  4.  Return to clinic in one week.

## 2020-07-21 LAB
ALBUMIN SERPL ELPH-MCNC: 3.87 G/DL (ref 3.35–5.55)
ALPHA1 GLOB SERPL ELPH-MCNC: 0.31 G/DL (ref 0.17–0.41)
ALPHA2 GLOB SERPL ELPH-MCNC: 0.73 G/DL (ref 0.43–0.99)
B-GLOBULIN SERPL ELPH-MCNC: 1.01 G/DL (ref 0.5–1.1)
GAMMA GLOB SERPL ELPH-MCNC: 1.28 G/DL (ref 0.67–1.58)
PATHOLOGIST INTERPRETATION SPE: NORMAL
PROT SERPL-MCNC: 7.2 G/DL (ref 6–8.4)

## 2020-07-22 LAB — TRYPTASE LEVEL: 3.6 NG/ML

## 2020-07-23 LAB
A ALTERNATA IGE QN: <0.1 KU/L
A FUMIGATUS IGE QN: <0.1 KU/L
BERMUDA GRASS IGE QN: <0.1 KU/L
CAT DANDER IGE QN: <0.1 KU/L
CEDAR IGE QN: <0.1 KU/L
D FARINAE IGE QN: <0.1 KU/L
D PTERONYSS IGE QN: <0.1 KU/L
DEPRECATED A ALTERNATA IGE RAST QL: NORMAL
DEPRECATED A FUMIGATUS IGE RAST QL: NORMAL
DEPRECATED BERMUDA GRASS IGE RAST QL: NORMAL
DEPRECATED CAT DANDER IGE RAST QL: NORMAL
DEPRECATED CEDAR IGE RAST QL: NORMAL
DEPRECATED D FARINAE IGE RAST QL: NORMAL
DEPRECATED D PTERONYSS IGE RAST QL: NORMAL
DEPRECATED DOG DANDER IGE RAST QL: NORMAL
DEPRECATED ELDER IGE RAST QL: NORMAL
DEPRECATED ENGL PLANTAIN IGE RAST QL: NORMAL
DEPRECATED PECAN/HICK TREE IGE RAST QL: NORMAL
DEPRECATED ROACH IGE RAST QL: NORMAL
DEPRECATED TIMOTHY IGE RAST QL: NORMAL
DEPRECATED WEST RAGWEED IGE RAST QL: NORMAL
DEPRECATED WHITE OAK IGE RAST QL: NORMAL
DOG DANDER IGE QN: <0.1 KU/L
ELDER IGE QN: <0.1 KU/L
ENGL PLANTAIN IGE QN: <0.1 KU/L
PECAN/HICK TREE IGE QN: <0.1 KU/L
ROACH IGE QN: <0.1 KU/L
TIMOTHY IGE QN: <0.1 KU/L
WEST RAGWEED IGE QN: <0.1 KU/L
WHITE OAK IGE QN: <0.1 KU/L

## 2020-08-14 ENCOUNTER — OFFICE VISIT (OUTPATIENT)
Dept: FAMILY MEDICINE | Facility: CLINIC | Age: 65
End: 2020-08-14
Payer: MEDICARE

## 2020-08-14 VITALS
WEIGHT: 178 LBS | TEMPERATURE: 97 F | HEIGHT: 65 IN | DIASTOLIC BLOOD PRESSURE: 60 MMHG | HEART RATE: 73 BPM | SYSTOLIC BLOOD PRESSURE: 100 MMHG | BODY MASS INDEX: 29.66 KG/M2 | OXYGEN SATURATION: 97 %

## 2020-08-14 DIAGNOSIS — L30.9 DERMATITIS: Primary | ICD-10-CM

## 2020-08-14 DIAGNOSIS — Z23 PNEUMOCOCCAL VACCINATION ADMINISTERED AT CURRENT VISIT: ICD-10-CM

## 2020-08-14 PROCEDURE — G0009 ADMIN PNEUMOCOCCAL VACCINE: HCPCS | Mod: HCNC,S$GLB,, | Performed by: FAMILY MEDICINE

## 2020-08-14 PROCEDURE — 90732 PNEUMOCOCCAL POLYSACCHARIDE VACCINE 23-VALENT =>2YO SQ IM: ICD-10-PCS | Mod: HCNC,S$GLB,, | Performed by: FAMILY MEDICINE

## 2020-08-14 PROCEDURE — 90732 PPSV23 VACC 2 YRS+ SUBQ/IM: CPT | Mod: HCNC,S$GLB,, | Performed by: FAMILY MEDICINE

## 2020-08-14 PROCEDURE — 99999 PR PBB SHADOW E&M-EST. PATIENT-LVL III: CPT | Mod: PBBFAC,HCNC,, | Performed by: FAMILY MEDICINE

## 2020-08-14 PROCEDURE — 99214 PR OFFICE/OUTPT VISIT, EST, LEVL IV, 30-39 MIN: ICD-10-PCS | Mod: 25,HCNC,S$GLB, | Performed by: FAMILY MEDICINE

## 2020-08-14 PROCEDURE — 3008F PR BODY MASS INDEX (BMI) DOCUMENTED: ICD-10-PCS | Mod: HCNC,CPTII,S$GLB, | Performed by: FAMILY MEDICINE

## 2020-08-14 PROCEDURE — 99214 OFFICE O/P EST MOD 30 MIN: CPT | Mod: 25,HCNC,S$GLB, | Performed by: FAMILY MEDICINE

## 2020-08-14 PROCEDURE — G0009 PNEUMOCOCCAL POLYSACCHARIDE VACCINE 23-VALENT =>2YO SQ IM: ICD-10-PCS | Mod: HCNC,S$GLB,, | Performed by: FAMILY MEDICINE

## 2020-08-14 PROCEDURE — 3008F BODY MASS INDEX DOCD: CPT | Mod: HCNC,CPTII,S$GLB, | Performed by: FAMILY MEDICINE

## 2020-08-14 PROCEDURE — 99999 PR PBB SHADOW E&M-EST. PATIENT-LVL III: ICD-10-PCS | Mod: PBBFAC,HCNC,, | Performed by: FAMILY MEDICINE

## 2020-08-14 RX ORDER — TRIAMCINOLONE ACETONIDE 1 MG/G
OINTMENT TOPICAL 2 TIMES DAILY
Qty: 80 G | Refills: 0 | Status: SHIPPED | OUTPATIENT
Start: 2020-08-14 | End: 2020-09-25 | Stop reason: SDUPTHER

## 2020-08-14 NOTE — PROGRESS NOTES
[facility]  Progress Note    SUBJECTIVE:     Chief Complaint   Patient presents with    Recurrent Skin Infections       HPI   Bossman Kumari  is a 64 y.o. female here for c/o having a rash on her body. It can pop up anywhere. Usually small and itchy. Unsure what causes it. No fevers, chills, erythema, discharge. Started over 2-3 weeks ago. Patient has no other new complaints/problems at this time.      Review of patient's allergies indicates:  No Known Allergies    Past Medical History:   Diagnosis Date    Acute cholecystitis 12/10/2015    Cholelithiasis 12/10/2015    DDD (degenerative disc disease), lumbar     Family history of diabetes mellitus type II 11/11/2015    GERD (gastroesophageal reflux disease)     Nuclear sclerosis of both eyes 6/13/2019    Unspecified asthma, with exacerbation     Urticaria     Vaginal delivery     x4     Past Surgical History:   Procedure Laterality Date    CHOLECYSTECTOMY      TOTAL ABDOMINAL HYSTERECTOMY  1981     Family History   Problem Relation Age of Onset    Hypertension Mother     Diabetes Mother     Cataracts Mother     Hypertension Father     Cataracts Father     No Known Problems Sister     No Known Problems Brother     No Known Problems Maternal Aunt     No Known Problems Maternal Uncle     No Known Problems Paternal Aunt     No Known Problems Paternal Uncle     No Known Problems Maternal Grandmother     No Known Problems Maternal Grandfather     No Known Problems Paternal Grandmother     No Known Problems Paternal Grandfather     Amblyopia Neg Hx     Blindness Neg Hx     Cancer Neg Hx     Glaucoma Neg Hx     Macular degeneration Neg Hx     Retinal detachment Neg Hx     Strabismus Neg Hx     Stroke Neg Hx     Thyroid disease Neg Hx      Social History     Tobacco Use    Smoking status: Never Smoker    Smokeless tobacco: Never Used   Substance Use Topics    Alcohol use: No    Drug use: No        Review of Systems    Constitutional: Negative for chills, fever and malaise/fatigue.   HENT: Negative.    Respiratory: Negative.  Negative for cough and shortness of breath.    Cardiovascular: Negative.  Negative for chest pain.   Gastrointestinal: Negative.  Negative for abdominal pain, nausea and vomiting.   Genitourinary: Negative.    Skin: Positive for itching and rash.   Neurological: Negative for weakness and headaches.   All other systems reviewed and are negative.    OBJECTIVE:     Vitals:    08/14/20 1436   BP: 100/60   Pulse: 73   Temp: 97.2 °F (36.2 °C)     Body mass index is 29.62 kg/m².    Physical Exam   Constitutional: She is oriented to person, place, and time and well-developed, well-nourished, and in no distress. No distress.   HENT:   Head: Normocephalic and atraumatic.   Eyes: Conjunctivae and EOM are normal.   Pulmonary/Chest: Effort normal.   Neurological: She is alert and oriented to person, place, and time.   Skin: She is not diaphoretic.       Old records were reviewed. Labs and/or images were independently reviewed.    ASSESSMENT     1. Dermatitis        PLAN:     Dermatitis  -     triamcinolone acetonide 0.1% (KENALOG) 0.1 % ointment; Apply topically 2 (two) times daily.  Dispense: 80 g; Refill: 0  -    I have discussed the common side effects of this medication with the patient and answered all of the questions they had at the time of this visit regarding this medication.      RTC PRRIMA Gotti MD  08/14/2020 2:50 PM

## 2020-08-21 ENCOUNTER — PATIENT OUTREACH (OUTPATIENT)
Dept: ADMINISTRATIVE | Facility: OTHER | Age: 65
End: 2020-08-21

## 2020-08-21 NOTE — PROGRESS NOTES
Requested updates within Care Everywhere.  Patient's chart was reviewed for overdue TIMOTHY topics.  Immunizations reconciled.    Orders placed:n/a  Tasked appts:n/a  Labs Linked:n/a

## 2020-08-25 ENCOUNTER — HOSPITAL ENCOUNTER (EMERGENCY)
Facility: HOSPITAL | Age: 65
Discharge: HOME | End: 2020-08-25
Attending: EMERGENCY MEDICINE
Payer: MEDICARE

## 2020-08-25 ENCOUNTER — TELEPHONE (OUTPATIENT)
Dept: OPHTHALMOLOGY | Facility: CLINIC | Age: 65
End: 2020-08-25

## 2020-08-25 ENCOUNTER — OFFICE VISIT (OUTPATIENT)
Dept: FAMILY MEDICINE | Facility: CLINIC | Age: 65
End: 2020-08-25
Payer: MEDICARE

## 2020-08-25 VITALS
HEART RATE: 74 BPM | RESPIRATION RATE: 20 BRPM | HEIGHT: 64 IN | RESPIRATION RATE: 16 BRPM | DIASTOLIC BLOOD PRESSURE: 88 MMHG | TEMPERATURE: 98 F | HEIGHT: 64 IN | HEART RATE: 74 BPM | DIASTOLIC BLOOD PRESSURE: 70 MMHG | OXYGEN SATURATION: 98 % | SYSTOLIC BLOOD PRESSURE: 110 MMHG | SYSTOLIC BLOOD PRESSURE: 117 MMHG | BODY MASS INDEX: 25.61 KG/M2 | BODY MASS INDEX: 25.75 KG/M2 | OXYGEN SATURATION: 99 % | WEIGHT: 150 LBS

## 2020-08-25 DIAGNOSIS — Z13.6 ENCOUNTER FOR LIPID SCREENING FOR CARDIOVASCULAR DISEASE: ICD-10-CM

## 2020-08-25 DIAGNOSIS — L30.9 DERMATITIS: Primary | ICD-10-CM

## 2020-08-25 DIAGNOSIS — Z12.11 SCREENING FOR COLORECTAL CANCER: ICD-10-CM

## 2020-08-25 DIAGNOSIS — Z12.12 SCREENING FOR COLORECTAL CANCER: ICD-10-CM

## 2020-08-25 DIAGNOSIS — Z00.00 GENERAL MEDICAL EXAM: Primary | ICD-10-CM

## 2020-08-25 DIAGNOSIS — L29.9 PRURITUS: ICD-10-CM

## 2020-08-25 DIAGNOSIS — Z11.4 ENCOUNTER FOR SCREENING FOR HIV: ICD-10-CM

## 2020-08-25 DIAGNOSIS — Z13.220 ENCOUNTER FOR LIPID SCREENING FOR CARDIOVASCULAR DISEASE: ICD-10-CM

## 2020-08-25 DIAGNOSIS — Z12.11 COLON CANCER SCREENING: Primary | ICD-10-CM

## 2020-08-25 PROCEDURE — 99396 PREV VISIT EST AGE 40-64: CPT | Mod: HCNC,S$GLB,, | Performed by: FAMILY MEDICINE

## 2020-08-25 PROCEDURE — 99999 PR PBB SHADOW E&M-EST. PATIENT-LVL III: CPT | Mod: PBBFAC,HCNC,, | Performed by: FAMILY MEDICINE

## 2020-08-25 PROCEDURE — 99999 PR PBB SHADOW E&M-EST. PATIENT-LVL III: ICD-10-PCS | Mod: PBBFAC,HCNC,, | Performed by: FAMILY MEDICINE

## 2020-08-25 PROCEDURE — 3008F PR BODY MASS INDEX (BMI) DOCUMENTED: ICD-10-PCS | Mod: HCNC,CPTII,S$GLB, | Performed by: FAMILY MEDICINE

## 2020-08-25 PROCEDURE — 99283 EMERGENCY DEPT VISIT LOW MDM: CPT | Mod: HCNC,ER

## 2020-08-25 PROCEDURE — 3008F BODY MASS INDEX DOCD: CPT | Mod: HCNC,CPTII,S$GLB, | Performed by: FAMILY MEDICINE

## 2020-08-25 PROCEDURE — 99396 PR PREVENTIVE VISIT,EST,40-64: ICD-10-PCS | Mod: HCNC,S$GLB,, | Performed by: FAMILY MEDICINE

## 2020-08-25 RX ORDER — PREDNISONE 10 MG/1
10 TABLET ORAL DAILY
Qty: 5 TABLET | Refills: 0 | Status: SHIPPED | OUTPATIENT
Start: 2020-08-25 | End: 2020-08-30

## 2020-08-25 NOTE — TELEPHONE ENCOUNTER
----- Message from Jojo Coles sent at 8/25/2020  8:24 AM CDT -----  Contact: Bossman Carltonemelia  Patient stated she cannot make her appointment due to her transportation. Patient stated she needed to reschedule this appointment. I tired to reschedule but I couldn't get patient second appointment. Patient contact number is [phone].

## 2020-08-25 NOTE — PROGRESS NOTES
"Subjective:       Patient ID: Bossman Kumari is a 64 y.o. female.    Chief Complaint: Annual Exam    HPI   64 year old female comes in for annual exam. She was originally booked for a rash but states that she is here for a check up. She reports that she has chronic, itchy skin for which she has seen her PCP, another provider and went to the emergency room today. She reports that the itchiness has been for over a year and no cause found. She admits that when she showers, symptoms are better.   No other acute concerns reported other than she wants "all my blood tests done."    Review of Systems   Constitutional: Negative for unexpected weight change.   HENT: Negative for ear pain and sore throat.    Eyes: Negative for visual disturbance.   Respiratory: Negative for shortness of breath.    Cardiovascular: Negative for chest pain.   Gastrointestinal: Negative for abdominal pain and blood in stool.   Endocrine: Negative for cold intolerance and heat intolerance.   Genitourinary: Negative for dysuria and frequency.   Integumentary:  Negative for rash.        itchiness   Neurological: Negative for weakness, numbness and headaches.   Hematological: Negative for adenopathy.   Psychiatric/Behavioral: Negative for suicidal ideas.         Objective:      Physical Exam  Vitals signs reviewed.   Constitutional:       General: She is not in acute distress.     Appearance: She is well-developed. She is not diaphoretic.   HENT:      Head: Normocephalic and atraumatic.      Right Ear: Tympanic membrane, ear canal and external ear normal.      Left Ear: Tympanic membrane, ear canal and external ear normal.      Nose: Nose normal.   Eyes:      General:         Right eye: No discharge.         Left eye: No discharge.      Conjunctiva/sclera: Conjunctivae normal.      Pupils: Pupils are equal, round, and reactive to light.   Neck:      Musculoskeletal: Normal range of motion and neck supple.      Thyroid: No thyromegaly.      Trachea: " No tracheal deviation.   Cardiovascular:      Rate and Rhythm: Normal rate and regular rhythm.      Pulses:           Radial pulses are 2+ on the right side and 2+ on the left side.      Heart sounds: Normal heart sounds, S1 normal and S2 normal. No murmur.   Pulmonary:      Effort: Pulmonary effort is normal. No respiratory distress.      Breath sounds: Normal breath sounds. No wheezing, rhonchi or rales.   Abdominal:      General: Bowel sounds are normal. There is no distension.      Palpations: Abdomen is soft. Abdomen is not rigid. There is no mass.      Tenderness: There is no abdominal tenderness. There is no guarding.   Lymphadenopathy:      Cervical: No cervical adenopathy.   Skin:     General: Skin is warm and dry.      Capillary Refill: Capillary refill takes less than 2 seconds.      Findings: No rash.      Comments: Skin is dry with some scaling around elbows and knees   Neurological:      Mental Status: She is alert and oriented to person, place, and time.      Cranial Nerves: No cranial nerve deficit.      Sensory: No sensory deficit.      Motor: No atrophy or abnormal muscle tone.      Deep Tendon Reflexes:      Reflex Scores:       Patellar reflexes are 2+ on the right side and 2+ on the left side.  Psychiatric:         Behavior: Behavior normal.         Assessment:       1. General medical exam    2. Encounter for lipid screening for cardiovascular disease    3. Encounter for screening for HIV    4. Screening for colorectal cancer    5. Pruritus        Plan:       Bossman was seen today for annual exam.    Diagnoses and all orders for this visit:    General medical exam  -     Comprehensive metabolic panel; Future  -     Lipid Panel; Future  -     HIV 1/2 Ag/Ab (4th Gen); Future  -     CBC auto differential; Future  -     TSH; Future  Age appropriate recommendations reviewed.  Screening labs ordered.    Encounter for lipid screening for cardiovascular disease  -     Lipid Panel; Future    Encounter  for screening for HIV  -     HIV 1/2 Ag/Ab (4th Gen); Future  Screen for HIV as per USPSTF guidelines    Screening for colorectal cancer  -     Case request GI: COLONOSCOPY  Patient agrees to schedule colonoscopy.     Pruritus  -     CBC auto differential; Future  -     TSH; Future  Discussed itchiness likely from dry skin, possibly eczema.   Advised Eucerin twice a day with one of those times after taking a shower.  Advised tyo make sure to run washing machine on hot cycle with a either washing  or a bit of vinegar every few months.

## 2020-08-25 NOTE — ED PROVIDER NOTES
Encounter Date: 8/25/2020       History     Chief Complaint   Patient presents with    Rash     PT REPORTS FINE ITCHY RASH TO RIGHT HAND AND BILAT LOWER EXTS SINCE 2200     This patient is at a chronic rash from months since she has seen a dermatologist been placed on multiple medications.  She presents here because she is unable to get any rest because itching.  She denies new any new complaints of.    The history is provided by the patient.     Review of patient's allergies indicates:  No Known Allergies  Past Medical History:   Diagnosis Date    Acute cholecystitis 12/10/2015    Cholelithiasis 12/10/2015    DDD (degenerative disc disease), lumbar     Family history of diabetes mellitus type II 11/11/2015    GERD (gastroesophageal reflux disease)     Nuclear sclerosis of both eyes 6/13/2019    Unspecified asthma, with exacerbation     Urticaria     Vaginal delivery     x4     Past Surgical History:   Procedure Laterality Date    CHOLECYSTECTOMY      TOTAL ABDOMINAL HYSTERECTOMY  1981     Family History   Problem Relation Age of Onset    Hypertension Mother     Diabetes Mother     Cataracts Mother     Hypertension Father     Cataracts Father     No Known Problems Sister     No Known Problems Brother     No Known Problems Maternal Aunt     No Known Problems Maternal Uncle     No Known Problems Paternal Aunt     No Known Problems Paternal Uncle     No Known Problems Maternal Grandmother     No Known Problems Maternal Grandfather     No Known Problems Paternal Grandmother     No Known Problems Paternal Grandfather     Amblyopia Neg Hx     Blindness Neg Hx     Cancer Neg Hx     Glaucoma Neg Hx     Macular degeneration Neg Hx     Retinal detachment Neg Hx     Strabismus Neg Hx     Stroke Neg Hx     Thyroid disease Neg Hx      Social History     Tobacco Use    Smoking status: Never Smoker    Smokeless tobacco: Never Used   Substance Use Topics    Alcohol use: No    Drug use: No      Review of Systems   Constitutional: Negative.    HENT: Negative.    Eyes: Negative.    Respiratory: Negative.    Cardiovascular: Negative.    Gastrointestinal: Negative.    Endocrine: Negative.    Genitourinary: Negative.    Musculoskeletal: Negative.    Skin: Positive for rash (Generalized).   Allergic/Immunologic: Negative.    Neurological: Negative.    Hematological: Negative.    Psychiatric/Behavioral: Negative.    All other systems reviewed and are negative.      Physical Exam     Initial Vitals [08/25/20 0145]   BP Pulse Resp Temp SpO2   117/88 74 20 97.6 °F (36.4 °C) 99 %      MAP       --         Physical Exam    Nursing note and vitals reviewed.  Constitutional: Vital signs are normal. She appears well-developed. She is active and cooperative.   HENT:   Head: Normocephalic and atraumatic.   Eyes: Conjunctivae, EOM and lids are normal. Pupils are equal, round, and reactive to light.   Neck: Trachea normal and full passive range of motion without pain. Neck supple. No thyroid mass present.   Cardiovascular: Normal rate, regular rhythm, S1 normal, S2 normal, normal heart sounds, intact distal pulses and normal pulses.   Abdominal: Soft. Normal appearance, normal aorta and bowel sounds are normal.   Musculoskeletal: Normal range of motion.   Lymphadenopathy:     She has no axillary adenopathy.   Neurological: She is alert and oriented to person, place, and time.   Skin: Skin is warm, dry and intact. Capillary refill takes less than 2 seconds. Rash noted. Rash is maculopapular.   Psychiatric: She has a normal mood and affect. Her speech is normal and behavior is normal. Judgment and thought content normal. Cognition and memory are normal.         ED Course   Procedures  Labs Reviewed - No data to display       Imaging Results    None                                          Clinical Impression:       ICD-10-CM ICD-9-CM   1. Dermatitis  L30.9 692.9             ED Disposition Condition    Discharge Stable         ED Prescriptions     Medication Sig Dispense Start Date End Date Auth. Provider    predniSONE (DELTASONE) 10 MG tablet Take 1 tablet (10 mg total) by mouth once daily. for 5 days 5 tablet 8/25/2020 8/30/2020 Aamir Leon MD        Follow-up Information     Follow up With Specialties Details Why Contact Info    See your dermatologist again  Schedule an appointment as soon as possible for a visit in 1 day                                       Aamir Leon MD  08/25/20 0208

## 2020-08-25 NOTE — ED TRIAGE NOTES
"Pt presents to the ed c/o itching  "for months" and reports rash. Pt actively scratching at arms and legs redness noted after scratching. Pt states worse at night. Pt also states she has seen a dermatologist for tests but has not followed up for results. No releif from benadryl and cortisone cream.  "

## 2020-09-17 DIAGNOSIS — Z12.11 COLON CANCER SCREENING: ICD-10-CM

## 2020-09-21 ENCOUNTER — TELEPHONE (OUTPATIENT)
Dept: FAMILY MEDICINE | Facility: CLINIC | Age: 65
End: 2020-09-21

## 2020-09-21 NOTE — TELEPHONE ENCOUNTER
----- Message from Shaun Justin Jr., MD sent at 9/20/2020  7:46 PM CDT -----  Patient missed follow up visit with me. Please call and reschedule as her thyroid needs retesting

## 2020-09-25 DIAGNOSIS — L30.9 DERMATITIS: ICD-10-CM

## 2020-09-25 RX ORDER — TRIAMCINOLONE ACETONIDE 1 MG/G
OINTMENT TOPICAL 2 TIMES DAILY
Qty: 80 G | Refills: 0 | Status: SHIPPED | OUTPATIENT
Start: 2020-09-25 | End: 2022-10-09

## 2020-09-25 NOTE — TELEPHONE ENCOUNTER
No new care gaps identified.  Powered by [employer]. Reference number: [id number]. 9/25/2020 8:04:03 AM OSMINT

## 2020-09-25 NOTE — TELEPHONE ENCOUNTER
----- Message from Ami Villanueva sent at 9/25/2020  7:50 AM CDT -----  Type: RX Refill Request    Who Called: Self     Have you contacted your pharmacy: no     Refill or New Rx: Refill     RX Name and Strength:triamcinolone acetonide 0.1% (KENALOG) 0.1 % ointment    Preferred Pharmacy with phone number:[id number] [place], [place] - [address] [place] [address] AT [facility] [phone] (Phone)  [phone] (Fax)        Local or Mail Order: local     Ordering Provider: Dr. Gotti    Would the patient rather a call back or a response via [facility]?  Call     Best Call Back Number.[phone] (home)

## 2020-10-01 ENCOUNTER — TELEPHONE (OUTPATIENT)
Dept: FAMILY MEDICINE | Facility: CLINIC | Age: 65
End: 2020-10-01

## 2020-10-01 NOTE — TELEPHONE ENCOUNTER
----- Message from Mery Villanueva sent at 10/1/2020  1:57 PM CDT -----  Endoscopy wanted me to relay that patient no-showed her colonoscopy for a 2nd time. They removed her from their appointment call back list.

## 2020-10-15 ENCOUNTER — PATIENT OUTREACH (OUTPATIENT)
Dept: ADMINISTRATIVE | Facility: OTHER | Age: 65
End: 2020-10-15

## 2020-12-15 ENCOUNTER — PATIENT OUTREACH (OUTPATIENT)
Dept: ADMINISTRATIVE | Facility: OTHER | Age: 65
End: 2020-12-15

## 2021-01-07 ENCOUNTER — TELEPHONE (OUTPATIENT)
Dept: OPHTHALMOLOGY | Facility: CLINIC | Age: 66
End: 2021-01-07

## 2021-01-17 ENCOUNTER — PATIENT OUTREACH (OUTPATIENT)
Dept: ADMINISTRATIVE | Facility: OTHER | Age: 66
End: 2021-01-17

## 2021-01-19 ENCOUNTER — TELEPHONE (OUTPATIENT)
Dept: OPHTHALMOLOGY | Facility: CLINIC | Age: 66
End: 2021-01-19

## 2021-03-02 ENCOUNTER — OFFICE VISIT (OUTPATIENT)
Dept: FAMILY MEDICINE | Facility: CLINIC | Age: 66
End: 2021-03-02
Payer: MEDICARE

## 2021-03-02 VITALS
OXYGEN SATURATION: 99 % | HEART RATE: 69 BPM | HEIGHT: 64 IN | WEIGHT: 176.56 LBS | TEMPERATURE: 98 F | RESPIRATION RATE: 18 BRPM | BODY MASS INDEX: 30.14 KG/M2 | DIASTOLIC BLOOD PRESSURE: 80 MMHG | SYSTOLIC BLOOD PRESSURE: 138 MMHG

## 2021-03-02 DIAGNOSIS — R20.0 NUMBNESS AND TINGLING OF BOTH FEET: ICD-10-CM

## 2021-03-02 DIAGNOSIS — R20.2 NUMBNESS AND TINGLING OF BOTH FEET: ICD-10-CM

## 2021-03-02 DIAGNOSIS — Z12.31 VISIT FOR SCREENING MAMMOGRAM: ICD-10-CM

## 2021-03-02 DIAGNOSIS — M79.672 PAIN IN BOTH FEET: Primary | ICD-10-CM

## 2021-03-02 DIAGNOSIS — M79.671 PAIN IN BOTH FEET: Primary | ICD-10-CM

## 2021-03-02 DIAGNOSIS — R73.9 HYPERGLYCEMIA: ICD-10-CM

## 2021-03-02 PROCEDURE — 1125F PR PAIN SEVERITY QUANTIFIED, PAIN PRESENT: ICD-10-PCS | Mod: S$GLB,,, | Performed by: FAMILY MEDICINE

## 2021-03-02 PROCEDURE — 99214 PR OFFICE/OUTPT VISIT, EST, LEVL IV, 30-39 MIN: ICD-10-PCS | Mod: S$GLB,,, | Performed by: FAMILY MEDICINE

## 2021-03-02 PROCEDURE — 99999 PR PBB SHADOW E&M-EST. PATIENT-LVL IV: CPT | Mod: PBBFAC,,, | Performed by: FAMILY MEDICINE

## 2021-03-02 PROCEDURE — 99999 PR PBB SHADOW E&M-EST. PATIENT-LVL IV: ICD-10-PCS | Mod: PBBFAC,,, | Performed by: FAMILY MEDICINE

## 2021-03-02 PROCEDURE — 3008F BODY MASS INDEX DOCD: CPT | Mod: CPTII,S$GLB,, | Performed by: FAMILY MEDICINE

## 2021-03-02 PROCEDURE — 99214 OFFICE O/P EST MOD 30 MIN: CPT | Mod: S$GLB,,, | Performed by: FAMILY MEDICINE

## 2021-03-02 PROCEDURE — 3008F PR BODY MASS INDEX (BMI) DOCUMENTED: ICD-10-PCS | Mod: CPTII,S$GLB,, | Performed by: FAMILY MEDICINE

## 2021-03-02 PROCEDURE — 1125F AMNT PAIN NOTED PAIN PRSNT: CPT | Mod: S$GLB,,, | Performed by: FAMILY MEDICINE

## 2021-03-02 RX ORDER — TRIAMCINOLONE ACETONIDE 1 MG/G
CREAM TOPICAL
COMMUNITY
Start: 2021-01-14 | End: 2021-08-18 | Stop reason: SDUPTHER

## 2021-03-02 RX ORDER — HYDROXYZINE HYDROCHLORIDE 10 MG/1
TABLET, FILM COATED ORAL
COMMUNITY
Start: 2021-01-14 | End: 2021-03-11

## 2021-03-02 RX ORDER — GABAPENTIN 100 MG/1
100 CAPSULE ORAL 2 TIMES DAILY
Qty: 60 CAPSULE | Refills: 0 | Status: SHIPPED | OUTPATIENT
Start: 2021-03-02 | End: 2021-03-17 | Stop reason: SDUPTHER

## 2021-03-11 ENCOUNTER — HOSPITAL ENCOUNTER (EMERGENCY)
Facility: HOSPITAL | Age: 66
Discharge: HOME | End: 2021-03-11
Attending: EMERGENCY MEDICINE
Payer: MEDICARE

## 2021-03-11 VITALS
OXYGEN SATURATION: 98 % | WEIGHT: 176 LBS | TEMPERATURE: 98 F | SYSTOLIC BLOOD PRESSURE: 125 MMHG | BODY MASS INDEX: 29.32 KG/M2 | HEIGHT: 65 IN | DIASTOLIC BLOOD PRESSURE: 70 MMHG | HEART RATE: 70 BPM | RESPIRATION RATE: 18 BRPM

## 2021-03-11 DIAGNOSIS — L29.9 PRURITUS: ICD-10-CM

## 2021-03-11 DIAGNOSIS — R21 RASH: Primary | ICD-10-CM

## 2021-03-11 PROCEDURE — 99284 EMERGENCY DEPT VISIT MOD MDM: CPT

## 2021-03-11 PROCEDURE — 25000003 PHARM REV CODE 250: Performed by: PHYSICIAN ASSISTANT

## 2021-03-11 RX ORDER — HYDROXYZINE HYDROCHLORIDE 25 MG/1
50 TABLET, FILM COATED ORAL 4 TIMES DAILY PRN
Qty: 40 TABLET | Refills: 0 | Status: SHIPPED | OUTPATIENT
Start: 2021-03-11 | End: 2021-03-21

## 2021-03-11 RX ORDER — CEPHALEXIN 500 MG/1
500 CAPSULE ORAL 4 TIMES DAILY
Qty: 20 CAPSULE | Refills: 0 | Status: SHIPPED | OUTPATIENT
Start: 2021-03-11 | End: 2021-03-16

## 2021-03-11 RX ORDER — CEPHALEXIN 500 MG/1
500 CAPSULE ORAL
Status: COMPLETED | OUTPATIENT
Start: 2021-03-11 | End: 2021-03-11

## 2021-03-11 RX ADMIN — CEPHALEXIN 500 MG: 500 CAPSULE ORAL at 08:03

## 2021-03-15 ENCOUNTER — PES CALL (OUTPATIENT)
Dept: ADMINISTRATIVE | Facility: CLINIC | Age: 66
End: 2021-03-15

## 2021-03-17 ENCOUNTER — OFFICE VISIT (OUTPATIENT)
Dept: FAMILY MEDICINE | Facility: CLINIC | Age: 66
End: 2021-03-17
Payer: MEDICARE

## 2021-03-17 VITALS
HEART RATE: 64 BPM | WEIGHT: 177.94 LBS | RESPIRATION RATE: 18 BRPM | BODY MASS INDEX: 29.65 KG/M2 | SYSTOLIC BLOOD PRESSURE: 120 MMHG | OXYGEN SATURATION: 98 % | DIASTOLIC BLOOD PRESSURE: 72 MMHG | HEIGHT: 65 IN | TEMPERATURE: 98 F

## 2021-03-17 DIAGNOSIS — Z12.11 SCREENING FOR COLORECTAL CANCER: ICD-10-CM

## 2021-03-17 DIAGNOSIS — M77.31 BILATERAL CALCANEAL SPURS: ICD-10-CM

## 2021-03-17 DIAGNOSIS — R20.0 NUMBNESS AND TINGLING OF BOTH FEET: ICD-10-CM

## 2021-03-17 DIAGNOSIS — L85.3 XEROSIS CUTIS: ICD-10-CM

## 2021-03-17 DIAGNOSIS — L29.9 PRURITUS: Primary | ICD-10-CM

## 2021-03-17 DIAGNOSIS — Z12.12 SCREENING FOR COLORECTAL CANCER: ICD-10-CM

## 2021-03-17 DIAGNOSIS — Z12.31 ENCOUNTER FOR SCREENING MAMMOGRAM FOR MALIGNANT NEOPLASM OF BREAST: ICD-10-CM

## 2021-03-17 DIAGNOSIS — M77.32 BILATERAL CALCANEAL SPURS: ICD-10-CM

## 2021-03-17 DIAGNOSIS — R20.2 NUMBNESS AND TINGLING OF BOTH FEET: ICD-10-CM

## 2021-03-17 PROCEDURE — 3008F BODY MASS INDEX DOCD: CPT | Mod: CPTII,S$GLB,, | Performed by: FAMILY MEDICINE

## 2021-03-17 PROCEDURE — 1126F AMNT PAIN NOTED NONE PRSNT: CPT | Mod: S$GLB,,, | Performed by: FAMILY MEDICINE

## 2021-03-17 PROCEDURE — 3008F PR BODY MASS INDEX (BMI) DOCUMENTED: ICD-10-PCS | Mod: CPTII,S$GLB,, | Performed by: FAMILY MEDICINE

## 2021-03-17 PROCEDURE — 99999 PR PBB SHADOW E&M-EST. PATIENT-LVL IV: ICD-10-PCS | Mod: PBBFAC,,, | Performed by: FAMILY MEDICINE

## 2021-03-17 PROCEDURE — 1126F PR PAIN SEVERITY QUANTIFIED, NO PAIN PRESENT: ICD-10-PCS | Mod: S$GLB,,, | Performed by: FAMILY MEDICINE

## 2021-03-17 PROCEDURE — 99214 OFFICE O/P EST MOD 30 MIN: CPT | Mod: S$GLB,,, | Performed by: FAMILY MEDICINE

## 2021-03-17 PROCEDURE — 99214 PR OFFICE/OUTPT VISIT, EST, LEVL IV, 30-39 MIN: ICD-10-PCS | Mod: S$GLB,,, | Performed by: FAMILY MEDICINE

## 2021-03-17 PROCEDURE — 99999 PR PBB SHADOW E&M-EST. PATIENT-LVL IV: CPT | Mod: PBBFAC,,, | Performed by: FAMILY MEDICINE

## 2021-03-17 RX ORDER — LORATADINE 10 MG/1
10 TABLET ORAL 2 TIMES DAILY PRN
Qty: 60 TABLET | Refills: 11 | Status: SHIPPED | OUTPATIENT
Start: 2021-03-17 | End: 2022-10-09

## 2021-03-17 RX ORDER — AMMONIUM LACTATE 12 G/100G
LOTION TOPICAL
Qty: 1 BOTTLE | Refills: 11 | Status: SHIPPED | OUTPATIENT
Start: 2021-03-17 | End: 2021-08-23 | Stop reason: SDUPTHER

## 2021-03-17 RX ORDER — GABAPENTIN 100 MG/1
100 CAPSULE ORAL 2 TIMES DAILY
Qty: 60 CAPSULE | Refills: 0 | Status: SHIPPED | OUTPATIENT
Start: 2021-03-17 | End: 2021-08-18 | Stop reason: SDUPTHER

## 2021-03-24 ENCOUNTER — PATIENT OUTREACH (OUTPATIENT)
Dept: ADMINISTRATIVE | Facility: OTHER | Age: 66
End: 2021-03-24

## 2021-03-26 ENCOUNTER — HOSPITAL ENCOUNTER (OUTPATIENT)
Dept: RADIOLOGY | Facility: HOSPITAL | Age: 66
Discharge: HOME | End: 2021-03-26
Attending: FAMILY MEDICINE
Payer: MEDICARE

## 2021-03-26 DIAGNOSIS — Z12.31 ENCOUNTER FOR SCREENING MAMMOGRAM FOR MALIGNANT NEOPLASM OF BREAST: ICD-10-CM

## 2021-03-26 DIAGNOSIS — L85.3 XEROSIS CUTIS: ICD-10-CM

## 2021-03-26 PROCEDURE — 77063 BREAST TOMOSYNTHESIS BI: CPT | Mod: 26,,, | Performed by: RADIOLOGY

## 2021-03-26 PROCEDURE — 77067 MAMMO DIGITAL SCREENING BILAT WITH TOMO: ICD-10-PCS | Mod: 26,,, | Performed by: RADIOLOGY

## 2021-03-26 PROCEDURE — 77067 SCR MAMMO BI INCL CAD: CPT | Mod: TC

## 2021-03-26 PROCEDURE — 77063 MAMMO DIGITAL SCREENING BILAT WITH TOMO: ICD-10-PCS | Mod: 26,,, | Performed by: RADIOLOGY

## 2021-03-26 PROCEDURE — 77067 SCR MAMMO BI INCL CAD: CPT | Mod: 26,,, | Performed by: RADIOLOGY

## 2021-03-28 ENCOUNTER — HOSPITAL ENCOUNTER (EMERGENCY)
Facility: HOSPITAL | Age: 66
Discharge: HOME | End: 2021-03-28
Attending: EMERGENCY MEDICINE
Payer: MEDICARE

## 2021-03-28 VITALS
HEIGHT: 64 IN | WEIGHT: 150 LBS | SYSTOLIC BLOOD PRESSURE: 128 MMHG | HEART RATE: 78 BPM | BODY MASS INDEX: 25.61 KG/M2 | DIASTOLIC BLOOD PRESSURE: 76 MMHG | OXYGEN SATURATION: 96 % | RESPIRATION RATE: 18 BRPM | TEMPERATURE: 98 F

## 2021-03-28 DIAGNOSIS — M79.641 RIGHT HAND PAIN: Primary | ICD-10-CM

## 2021-03-28 PROCEDURE — 99283 EMERGENCY DEPT VISIT LOW MDM: CPT

## 2021-03-28 PROCEDURE — 25000003 PHARM REV CODE 250: Performed by: EMERGENCY MEDICINE

## 2021-03-28 RX ORDER — ACETAMINOPHEN 500 MG
1000 TABLET ORAL
Status: COMPLETED | OUTPATIENT
Start: 2021-03-28 | End: 2021-03-28

## 2021-03-28 RX ADMIN — ACETAMINOPHEN 1000 MG: 500 TABLET ORAL at 05:03

## 2021-03-30 ENCOUNTER — TELEPHONE (OUTPATIENT)
Dept: FAMILY MEDICINE | Facility: CLINIC | Age: 66
End: 2021-03-30

## 2021-03-30 DIAGNOSIS — G62.9 PERIPHERAL POLYNEUROPATHY: Primary | ICD-10-CM

## 2021-04-01 ENCOUNTER — TELEPHONE (OUTPATIENT)
Dept: NEUROLOGY | Facility: CLINIC | Age: 66
End: 2021-04-01

## 2021-04-05 ENCOUNTER — OFFICE VISIT (OUTPATIENT)
Dept: ALLERGY | Facility: CLINIC | Age: 66
End: 2021-04-05
Payer: MEDICARE

## 2021-04-05 VITALS — WEIGHT: 177.5 LBS | HEIGHT: 64 IN | BODY MASS INDEX: 30.3 KG/M2

## 2021-04-05 DIAGNOSIS — M19.079 OSTEOARTHRITIS OF ANKLE, UNSPECIFIED LATERALITY, UNSPECIFIED OSTEOARTHRITIS TYPE: ICD-10-CM

## 2021-04-05 DIAGNOSIS — M17.12 OSTEOARTHRITIS OF LEFT KNEE, UNSPECIFIED OSTEOARTHRITIS TYPE: ICD-10-CM

## 2021-04-05 DIAGNOSIS — M51.36 DDD (DEGENERATIVE DISC DISEASE), LUMBAR: ICD-10-CM

## 2021-04-05 DIAGNOSIS — R21 RASH: ICD-10-CM

## 2021-04-05 DIAGNOSIS — J31.0 CHRONIC RHINITIS: Primary | ICD-10-CM

## 2021-04-05 PROCEDURE — 3008F PR BODY MASS INDEX (BMI) DOCUMENTED: ICD-10-PCS | Mod: CPTII,S$GLB,, | Performed by: ALLERGY & IMMUNOLOGY

## 2021-04-05 PROCEDURE — 3008F BODY MASS INDEX DOCD: CPT | Mod: CPTII,S$GLB,, | Performed by: ALLERGY & IMMUNOLOGY

## 2021-04-05 PROCEDURE — 99999 PR PBB SHADOW E&M-EST. PATIENT-LVL III: ICD-10-PCS | Mod: PBBFAC,,, | Performed by: ALLERGY & IMMUNOLOGY

## 2021-04-05 PROCEDURE — 99215 PR OFFICE/OUTPT VISIT, EST, LEVL V, 40-54 MIN: ICD-10-PCS | Mod: S$GLB,,, | Performed by: ALLERGY & IMMUNOLOGY

## 2021-04-05 PROCEDURE — 99215 OFFICE O/P EST HI 40 MIN: CPT | Mod: S$GLB,,, | Performed by: ALLERGY & IMMUNOLOGY

## 2021-04-05 PROCEDURE — 99999 PR PBB SHADOW E&M-EST. PATIENT-LVL III: CPT | Mod: PBBFAC,,, | Performed by: ALLERGY & IMMUNOLOGY

## 2021-04-06 ENCOUNTER — OFFICE VISIT (OUTPATIENT)
Dept: DERMATOLOGY | Facility: CLINIC | Age: 66
End: 2021-04-06
Payer: MEDICARE

## 2021-04-06 DIAGNOSIS — L29.9 PRURITUS: ICD-10-CM

## 2021-04-06 PROCEDURE — 99204 PR OFFICE/OUTPT VISIT, NEW, LEVL IV, 45-59 MIN: ICD-10-PCS | Mod: S$GLB,,, | Performed by: DERMATOLOGY

## 2021-04-06 PROCEDURE — 99999 PR PBB SHADOW E&M-EST. PATIENT-LVL III: CPT | Mod: PBBFAC,,, | Performed by: DERMATOLOGY

## 2021-04-06 PROCEDURE — 99204 OFFICE O/P NEW MOD 45 MIN: CPT | Mod: S$GLB,,, | Performed by: DERMATOLOGY

## 2021-04-06 PROCEDURE — 1126F PR PAIN SEVERITY QUANTIFIED, NO PAIN PRESENT: ICD-10-PCS | Mod: S$GLB,,, | Performed by: DERMATOLOGY

## 2021-04-06 PROCEDURE — 1126F AMNT PAIN NOTED NONE PRSNT: CPT | Mod: S$GLB,,, | Performed by: DERMATOLOGY

## 2021-04-06 PROCEDURE — 99999 PR PBB SHADOW E&M-EST. PATIENT-LVL III: ICD-10-PCS | Mod: PBBFAC,,, | Performed by: DERMATOLOGY

## 2021-04-06 RX ORDER — CLOBETASOL PROPIONATE 0.46 MG/ML
SOLUTION TOPICAL
Qty: 50 ML | Refills: 3 | Status: SHIPPED | OUTPATIENT
Start: 2021-04-06 | End: 2022-10-09

## 2021-04-16 ENCOUNTER — TELEPHONE (OUTPATIENT)
Dept: OPTOMETRY | Facility: CLINIC | Age: 66
End: 2021-04-16

## 2021-04-16 ENCOUNTER — OFFICE VISIT (OUTPATIENT)
Dept: OPTOMETRY | Facility: CLINIC | Age: 66
End: 2021-04-16
Payer: MEDICARE

## 2021-04-16 DIAGNOSIS — H25.13 NUCLEAR SCLEROSIS OF BOTH EYES: ICD-10-CM

## 2021-04-16 DIAGNOSIS — Z01.00 ROUTINE EYE EXAM: Primary | ICD-10-CM

## 2021-04-16 DIAGNOSIS — H10.13 ALLERGIC CONJUNCTIVITIS, BILATERAL: ICD-10-CM

## 2021-04-16 DIAGNOSIS — H52.7 REFRACTIVE ERROR: ICD-10-CM

## 2021-04-16 DIAGNOSIS — H02.402 PTOSIS, LEFT EYELID: ICD-10-CM

## 2021-04-16 PROCEDURE — 99999 PR PBB SHADOW E&M-EST. PATIENT-LVL III: CPT | Mod: PBBFAC,,, | Performed by: OPTOMETRIST

## 2021-04-16 PROCEDURE — 92015 PR REFRACTION: ICD-10-PCS | Mod: S$GLB,,, | Performed by: OPTOMETRIST

## 2021-04-16 PROCEDURE — 99999 PR PBB SHADOW E&M-EST. PATIENT-LVL III: ICD-10-PCS | Mod: PBBFAC,,, | Performed by: OPTOMETRIST

## 2021-04-16 PROCEDURE — 1126F AMNT PAIN NOTED NONE PRSNT: CPT | Mod: S$GLB,,, | Performed by: OPTOMETRIST

## 2021-04-16 PROCEDURE — 92014 PR EYE EXAM, EST PATIENT,COMPREHESV: ICD-10-PCS | Mod: S$GLB,,, | Performed by: OPTOMETRIST

## 2021-04-16 PROCEDURE — 1126F PR PAIN SEVERITY QUANTIFIED, NO PAIN PRESENT: ICD-10-PCS | Mod: S$GLB,,, | Performed by: OPTOMETRIST

## 2021-04-16 PROCEDURE — 92014 COMPRE OPH EXAM EST PT 1/>: CPT | Mod: S$GLB,,, | Performed by: OPTOMETRIST

## 2021-04-16 PROCEDURE — 92015 DETERMINE REFRACTIVE STATE: CPT | Mod: S$GLB,,, | Performed by: OPTOMETRIST

## 2021-06-10 ENCOUNTER — PATIENT OUTREACH (OUTPATIENT)
Dept: ADMINISTRATIVE | Facility: OTHER | Age: 66
End: 2021-06-10

## 2021-07-16 ENCOUNTER — PES CALL (OUTPATIENT)
Dept: ADMINISTRATIVE | Facility: CLINIC | Age: 66
End: 2021-07-16

## 2021-08-06 ENCOUNTER — TELEPHONE (OUTPATIENT)
Dept: FAMILY MEDICINE | Facility: CLINIC | Age: 66
End: 2021-08-06

## 2021-08-18 ENCOUNTER — PATIENT OUTREACH (OUTPATIENT)
Dept: ADMINISTRATIVE | Facility: OTHER | Age: 66
End: 2021-08-18

## 2021-08-18 ENCOUNTER — OFFICE VISIT (OUTPATIENT)
Dept: FAMILY MEDICINE | Facility: CLINIC | Age: 66
End: 2021-08-18
Payer: MEDICARE

## 2021-08-18 VITALS
SYSTOLIC BLOOD PRESSURE: 118 MMHG | WEIGHT: 180.31 LBS | TEMPERATURE: 98 F | DIASTOLIC BLOOD PRESSURE: 72 MMHG | HEART RATE: 65 BPM | HEIGHT: 64 IN | OXYGEN SATURATION: 98 % | BODY MASS INDEX: 30.78 KG/M2

## 2021-08-18 DIAGNOSIS — M25.531 RIGHT WRIST PAIN: Primary | ICD-10-CM

## 2021-08-18 DIAGNOSIS — R20.2 NUMBNESS AND TINGLING OF BOTH FEET: ICD-10-CM

## 2021-08-18 DIAGNOSIS — J45.30 ASTHMA, CHRONIC, MILD PERSISTENT, UNCOMPLICATED: ICD-10-CM

## 2021-08-18 DIAGNOSIS — L30.8 PRURITIC DERMATITIS: ICD-10-CM

## 2021-08-18 DIAGNOSIS — R20.0 NUMBNESS AND TINGLING OF BOTH FEET: ICD-10-CM

## 2021-08-18 PROCEDURE — 1160F PR REVIEW ALL MEDS BY PRESCRIBER/CLIN PHARMACIST DOCUMENTED: ICD-10-PCS | Mod: CPTII,S$GLB,, | Performed by: FAMILY MEDICINE

## 2021-08-18 PROCEDURE — 3008F PR BODY MASS INDEX (BMI) DOCUMENTED: ICD-10-PCS | Mod: CPTII,S$GLB,, | Performed by: FAMILY MEDICINE

## 2021-08-18 PROCEDURE — 99499 UNLISTED E&M SERVICE: CPT | Mod: S$GLB,,, | Performed by: FAMILY MEDICINE

## 2021-08-18 PROCEDURE — 3078F PR MOST RECENT DIASTOLIC BLOOD PRESSURE < 80 MM HG: ICD-10-PCS | Mod: CPTII,S$GLB,, | Performed by: FAMILY MEDICINE

## 2021-08-18 PROCEDURE — 1160F RVW MEDS BY RX/DR IN RCRD: CPT | Mod: CPTII,S$GLB,, | Performed by: FAMILY MEDICINE

## 2021-08-18 PROCEDURE — 3074F SYST BP LT 130 MM HG: CPT | Mod: CPTII,S$GLB,, | Performed by: FAMILY MEDICINE

## 2021-08-18 PROCEDURE — 3044F PR MOST RECENT HEMOGLOBIN A1C LEVEL <7.0%: ICD-10-PCS | Mod: CPTII,S$GLB,, | Performed by: FAMILY MEDICINE

## 2021-08-18 PROCEDURE — 3044F HG A1C LEVEL LT 7.0%: CPT | Mod: CPTII,S$GLB,, | Performed by: FAMILY MEDICINE

## 2021-08-18 PROCEDURE — 3074F PR MOST RECENT SYSTOLIC BLOOD PRESSURE < 130 MM HG: ICD-10-PCS | Mod: CPTII,S$GLB,, | Performed by: FAMILY MEDICINE

## 2021-08-18 PROCEDURE — 99214 OFFICE O/P EST MOD 30 MIN: CPT | Mod: S$GLB,,, | Performed by: FAMILY MEDICINE

## 2021-08-18 PROCEDURE — 3078F DIAST BP <80 MM HG: CPT | Mod: CPTII,S$GLB,, | Performed by: FAMILY MEDICINE

## 2021-08-18 PROCEDURE — 1159F MED LIST DOCD IN RCRD: CPT | Mod: CPTII,S$GLB,, | Performed by: FAMILY MEDICINE

## 2021-08-18 PROCEDURE — 1125F AMNT PAIN NOTED PAIN PRSNT: CPT | Mod: CPTII,S$GLB,, | Performed by: FAMILY MEDICINE

## 2021-08-18 PROCEDURE — 99499 RISK ADDL DX/OHS AUDIT: ICD-10-PCS | Mod: S$GLB,,, | Performed by: FAMILY MEDICINE

## 2021-08-18 PROCEDURE — 3008F BODY MASS INDEX DOCD: CPT | Mod: CPTII,S$GLB,, | Performed by: FAMILY MEDICINE

## 2021-08-18 PROCEDURE — 1125F PR PAIN SEVERITY QUANTIFIED, PAIN PRESENT: ICD-10-PCS | Mod: CPTII,S$GLB,, | Performed by: FAMILY MEDICINE

## 2021-08-18 PROCEDURE — 99999 PR PBB SHADOW E&M-EST. PATIENT-LVL IV: CPT | Mod: PBBFAC,,, | Performed by: FAMILY MEDICINE

## 2021-08-18 PROCEDURE — 1159F PR MEDICATION LIST DOCUMENTED IN MEDICAL RECORD: ICD-10-PCS | Mod: CPTII,S$GLB,, | Performed by: FAMILY MEDICINE

## 2021-08-18 PROCEDURE — 99214 PR OFFICE/OUTPT VISIT, EST, LEVL IV, 30-39 MIN: ICD-10-PCS | Mod: S$GLB,,, | Performed by: FAMILY MEDICINE

## 2021-08-18 PROCEDURE — 99999 PR PBB SHADOW E&M-EST. PATIENT-LVL IV: ICD-10-PCS | Mod: PBBFAC,,, | Performed by: FAMILY MEDICINE

## 2021-08-18 RX ORDER — ALBUTEROL SULFATE 90 UG/1
2 AEROSOL, METERED RESPIRATORY (INHALATION) EVERY 6 HOURS PRN
Qty: 18 G | Refills: 3 | Status: SHIPPED | OUTPATIENT
Start: 2021-08-18 | End: 2023-04-10 | Stop reason: SDUPTHER

## 2021-08-18 RX ORDER — TRIAMCINOLONE ACETONIDE 1 MG/G
CREAM TOPICAL 2 TIMES DAILY
Qty: 45 G | Refills: 3 | Status: SHIPPED | OUTPATIENT
Start: 2021-08-18 | End: 2022-10-09 | Stop reason: ALTCHOICE

## 2021-08-18 RX ORDER — GABAPENTIN 100 MG/1
100 CAPSULE ORAL 3 TIMES DAILY
Qty: 90 CAPSULE | Refills: 3 | Status: SHIPPED | OUTPATIENT
Start: 2021-08-18 | End: 2021-08-23 | Stop reason: SDUPTHER

## 2021-08-18 RX ORDER — IBUPROFEN 600 MG/1
600 TABLET ORAL 3 TIMES DAILY
Qty: 30 TABLET | Refills: 2 | Status: SHIPPED | OUTPATIENT
Start: 2021-08-18 | End: 2021-08-20

## 2021-08-18 RX ORDER — FLUTICASONE PROPIONATE 50 MCG
1 SPRAY, SUSPENSION (ML) NASAL DAILY
Qty: 16 G | Refills: 3 | Status: SHIPPED | OUTPATIENT
Start: 2021-08-18 | End: 2022-10-09

## 2021-08-19 DIAGNOSIS — M25.531 RIGHT WRIST PAIN: Primary | ICD-10-CM

## 2021-08-20 ENCOUNTER — OFFICE VISIT (OUTPATIENT)
Dept: ORTHOPEDICS | Facility: CLINIC | Age: 66
End: 2021-08-20
Attending: ORTHOPAEDIC SURGERY
Payer: MEDICARE

## 2021-08-20 VITALS
DIASTOLIC BLOOD PRESSURE: 70 MMHG | HEIGHT: 64 IN | OXYGEN SATURATION: 97 % | BODY MASS INDEX: 31.05 KG/M2 | HEART RATE: 68 BPM | RESPIRATION RATE: 18 BRPM | SYSTOLIC BLOOD PRESSURE: 120 MMHG | WEIGHT: 181.88 LBS

## 2021-08-20 DIAGNOSIS — M25.531 RIGHT WRIST PAIN: ICD-10-CM

## 2021-08-20 PROCEDURE — 1125F AMNT PAIN NOTED PAIN PRSNT: CPT | Mod: CPTII,S$GLB,, | Performed by: ORTHOPAEDIC SURGERY

## 2021-08-20 PROCEDURE — 99204 OFFICE O/P NEW MOD 45 MIN: CPT | Mod: S$GLB,,, | Performed by: ORTHOPAEDIC SURGERY

## 2021-08-20 PROCEDURE — 99204 PR OFFICE/OUTPT VISIT, NEW, LEVL IV, 45-59 MIN: ICD-10-PCS | Mod: S$GLB,,, | Performed by: ORTHOPAEDIC SURGERY

## 2021-08-20 PROCEDURE — 3078F DIAST BP <80 MM HG: CPT | Mod: CPTII,S$GLB,, | Performed by: ORTHOPAEDIC SURGERY

## 2021-08-20 PROCEDURE — 99999 PR PBB SHADOW E&M-EST. PATIENT-LVL III: ICD-10-PCS | Mod: PBBFAC,,, | Performed by: ORTHOPAEDIC SURGERY

## 2021-08-20 PROCEDURE — 99999 PR PBB SHADOW E&M-EST. PATIENT-LVL III: CPT | Mod: PBBFAC,,, | Performed by: ORTHOPAEDIC SURGERY

## 2021-08-20 PROCEDURE — 1159F PR MEDICATION LIST DOCUMENTED IN MEDICAL RECORD: ICD-10-PCS | Mod: CPTII,S$GLB,, | Performed by: ORTHOPAEDIC SURGERY

## 2021-08-20 PROCEDURE — 1160F PR REVIEW ALL MEDS BY PRESCRIBER/CLIN PHARMACIST DOCUMENTED: ICD-10-PCS | Mod: CPTII,S$GLB,, | Performed by: ORTHOPAEDIC SURGERY

## 2021-08-20 PROCEDURE — 3044F PR MOST RECENT HEMOGLOBIN A1C LEVEL <7.0%: ICD-10-PCS | Mod: CPTII,S$GLB,, | Performed by: ORTHOPAEDIC SURGERY

## 2021-08-20 PROCEDURE — 3074F PR MOST RECENT SYSTOLIC BLOOD PRESSURE < 130 MM HG: ICD-10-PCS | Mod: CPTII,S$GLB,, | Performed by: ORTHOPAEDIC SURGERY

## 2021-08-20 PROCEDURE — 1159F MED LIST DOCD IN RCRD: CPT | Mod: CPTII,S$GLB,, | Performed by: ORTHOPAEDIC SURGERY

## 2021-08-20 PROCEDURE — 1160F RVW MEDS BY RX/DR IN RCRD: CPT | Mod: CPTII,S$GLB,, | Performed by: ORTHOPAEDIC SURGERY

## 2021-08-20 PROCEDURE — 3044F HG A1C LEVEL LT 7.0%: CPT | Mod: CPTII,S$GLB,, | Performed by: ORTHOPAEDIC SURGERY

## 2021-08-20 PROCEDURE — 1125F PR PAIN SEVERITY QUANTIFIED, PAIN PRESENT: ICD-10-PCS | Mod: CPTII,S$GLB,, | Performed by: ORTHOPAEDIC SURGERY

## 2021-08-20 PROCEDURE — 3008F BODY MASS INDEX DOCD: CPT | Mod: CPTII,S$GLB,, | Performed by: ORTHOPAEDIC SURGERY

## 2021-08-20 PROCEDURE — 3008F PR BODY MASS INDEX (BMI) DOCUMENTED: ICD-10-PCS | Mod: CPTII,S$GLB,, | Performed by: ORTHOPAEDIC SURGERY

## 2021-08-20 PROCEDURE — 3078F PR MOST RECENT DIASTOLIC BLOOD PRESSURE < 80 MM HG: ICD-10-PCS | Mod: CPTII,S$GLB,, | Performed by: ORTHOPAEDIC SURGERY

## 2021-08-20 PROCEDURE — 3074F SYST BP LT 130 MM HG: CPT | Mod: CPTII,S$GLB,, | Performed by: ORTHOPAEDIC SURGERY

## 2021-08-20 RX ORDER — MELOXICAM 7.5 MG/1
7.5 TABLET ORAL DAILY
Qty: 30 TABLET | Refills: 0 | Status: SHIPPED | OUTPATIENT
Start: 2021-08-20 | End: 2021-10-13

## 2021-08-23 DIAGNOSIS — R20.0 NUMBNESS AND TINGLING OF BOTH FEET: ICD-10-CM

## 2021-08-23 DIAGNOSIS — R20.2 NUMBNESS AND TINGLING OF BOTH FEET: ICD-10-CM

## 2021-08-23 DIAGNOSIS — L85.3 XEROSIS CUTIS: ICD-10-CM

## 2021-08-23 DIAGNOSIS — L29.9 PRURITUS: ICD-10-CM

## 2021-08-23 RX ORDER — AMMONIUM LACTATE 12 G/100G
LOTION TOPICAL
Qty: 1 BOTTLE | Refills: 11 | Status: SHIPPED | OUTPATIENT
Start: 2021-08-23 | End: 2023-02-23 | Stop reason: SDUPTHER

## 2021-08-23 RX ORDER — GABAPENTIN 100 MG/1
100 CAPSULE ORAL 3 TIMES DAILY
Qty: 90 CAPSULE | Refills: 3 | Status: SHIPPED | OUTPATIENT
Start: 2021-08-23 | End: 2022-10-09

## 2021-10-12 ENCOUNTER — PATIENT OUTREACH (OUTPATIENT)
Dept: ADMINISTRATIVE | Facility: OTHER | Age: 66
End: 2021-10-12

## 2021-10-13 ENCOUNTER — OFFICE VISIT (OUTPATIENT)
Dept: FAMILY MEDICINE | Facility: CLINIC | Age: 66
End: 2021-10-13
Payer: MEDICARE

## 2021-10-13 VITALS
DIASTOLIC BLOOD PRESSURE: 68 MMHG | WEIGHT: 180.13 LBS | HEIGHT: 64 IN | OXYGEN SATURATION: 97 % | BODY MASS INDEX: 30.75 KG/M2 | HEART RATE: 71 BPM | SYSTOLIC BLOOD PRESSURE: 112 MMHG | TEMPERATURE: 98 F

## 2021-10-13 DIAGNOSIS — M17.11 PRIMARY OSTEOARTHRITIS OF RIGHT KNEE: ICD-10-CM

## 2021-10-13 DIAGNOSIS — M25.561 ACUTE PAIN OF RIGHT KNEE: Primary | ICD-10-CM

## 2021-10-13 PROCEDURE — 3044F HG A1C LEVEL LT 7.0%: CPT | Mod: HCNC,CPTII,S$GLB, | Performed by: INTERNAL MEDICINE

## 2021-10-13 PROCEDURE — 3008F BODY MASS INDEX DOCD: CPT | Mod: HCNC,CPTII,S$GLB, | Performed by: INTERNAL MEDICINE

## 2021-10-13 PROCEDURE — 1159F MED LIST DOCD IN RCRD: CPT | Mod: HCNC,CPTII,S$GLB, | Performed by: INTERNAL MEDICINE

## 2021-10-13 PROCEDURE — 3078F DIAST BP <80 MM HG: CPT | Mod: HCNC,CPTII,S$GLB, | Performed by: INTERNAL MEDICINE

## 2021-10-13 PROCEDURE — 3008F PR BODY MASS INDEX (BMI) DOCUMENTED: ICD-10-PCS | Mod: HCNC,CPTII,S$GLB, | Performed by: INTERNAL MEDICINE

## 2021-10-13 PROCEDURE — 99999 PR PBB SHADOW E&M-EST. PATIENT-LVL IV: ICD-10-PCS | Mod: PBBFAC,HCNC,, | Performed by: INTERNAL MEDICINE

## 2021-10-13 PROCEDURE — 99214 OFFICE O/P EST MOD 30 MIN: CPT | Mod: HCNC,S$GLB,, | Performed by: INTERNAL MEDICINE

## 2021-10-13 PROCEDURE — 99999 PR PBB SHADOW E&M-EST. PATIENT-LVL IV: CPT | Mod: PBBFAC,HCNC,, | Performed by: INTERNAL MEDICINE

## 2021-10-13 PROCEDURE — 3074F PR MOST RECENT SYSTOLIC BLOOD PRESSURE < 130 MM HG: ICD-10-PCS | Mod: HCNC,CPTII,S$GLB, | Performed by: INTERNAL MEDICINE

## 2021-10-13 PROCEDURE — 3074F SYST BP LT 130 MM HG: CPT | Mod: HCNC,CPTII,S$GLB, | Performed by: INTERNAL MEDICINE

## 2021-10-13 PROCEDURE — 3078F PR MOST RECENT DIASTOLIC BLOOD PRESSURE < 80 MM HG: ICD-10-PCS | Mod: HCNC,CPTII,S$GLB, | Performed by: INTERNAL MEDICINE

## 2021-10-13 PROCEDURE — 3044F PR MOST RECENT HEMOGLOBIN A1C LEVEL <7.0%: ICD-10-PCS | Mod: HCNC,CPTII,S$GLB, | Performed by: INTERNAL MEDICINE

## 2021-10-13 PROCEDURE — 1160F RVW MEDS BY RX/DR IN RCRD: CPT | Mod: HCNC,CPTII,S$GLB, | Performed by: INTERNAL MEDICINE

## 2021-10-13 PROCEDURE — 1160F PR REVIEW ALL MEDS BY PRESCRIBER/CLIN PHARMACIST DOCUMENTED: ICD-10-PCS | Mod: HCNC,CPTII,S$GLB, | Performed by: INTERNAL MEDICINE

## 2021-10-13 PROCEDURE — 99214 PR OFFICE/OUTPT VISIT, EST, LEVL IV, 30-39 MIN: ICD-10-PCS | Mod: HCNC,S$GLB,, | Performed by: INTERNAL MEDICINE

## 2021-10-13 PROCEDURE — 1159F PR MEDICATION LIST DOCUMENTED IN MEDICAL RECORD: ICD-10-PCS | Mod: HCNC,CPTII,S$GLB, | Performed by: INTERNAL MEDICINE

## 2021-10-13 RX ORDER — MELOXICAM 7.5 MG/1
7.5 TABLET ORAL DAILY
Qty: 30 TABLET | Refills: 0 | Status: SHIPPED | OUTPATIENT
Start: 2021-10-13 | End: 2021-10-13

## 2021-10-13 RX ORDER — MELOXICAM 7.5 MG/1
7.5 TABLET ORAL DAILY
Qty: 30 TABLET | Refills: 0 | Status: SHIPPED | OUTPATIENT
Start: 2021-10-13 | End: 2021-11-15 | Stop reason: SDUPTHER

## 2021-11-03 ENCOUNTER — TELEPHONE (OUTPATIENT)
Dept: OPTOMETRY | Facility: CLINIC | Age: 66
End: 2021-11-03
Payer: MEDICARE

## 2021-11-15 DIAGNOSIS — M25.561 ACUTE PAIN OF RIGHT KNEE: ICD-10-CM

## 2021-11-15 RX ORDER — MELOXICAM 7.5 MG/1
7.5 TABLET ORAL DAILY
Qty: 30 TABLET | Refills: 0 | Status: SHIPPED | OUTPATIENT
Start: 2021-11-15 | End: 2021-12-27 | Stop reason: SDUPTHER

## 2021-12-27 DIAGNOSIS — M25.561 ACUTE PAIN OF RIGHT KNEE: ICD-10-CM

## 2021-12-28 RX ORDER — MELOXICAM 7.5 MG/1
7.5 TABLET ORAL DAILY
Qty: 30 TABLET | Refills: 0 | Status: SHIPPED | OUTPATIENT
Start: 2021-12-28 | End: 2022-10-09

## 2021-12-29 ENCOUNTER — TELEPHONE (OUTPATIENT)
Dept: FAMILY MEDICINE | Facility: CLINIC | Age: 66
End: 2021-12-29
Payer: MEDICARE

## 2022-01-18 ENCOUNTER — OFFICE VISIT (OUTPATIENT)
Dept: OTOLARYNGOLOGY | Facility: CLINIC | Age: 67
End: 2022-01-18
Payer: MEDICARE

## 2022-01-18 ENCOUNTER — PATIENT OUTREACH (OUTPATIENT)
Dept: ADMINISTRATIVE | Facility: OTHER | Age: 67
End: 2022-01-18
Payer: MEDICARE

## 2022-01-18 VITALS
BODY MASS INDEX: 29.77 KG/M2 | DIASTOLIC BLOOD PRESSURE: 64 MMHG | HEIGHT: 64 IN | WEIGHT: 174.38 LBS | SYSTOLIC BLOOD PRESSURE: 100 MMHG

## 2022-01-18 DIAGNOSIS — J39.2 IRRITATED THROAT: ICD-10-CM

## 2022-01-18 DIAGNOSIS — J02.9 SORE THROAT: Primary | ICD-10-CM

## 2022-01-18 PROCEDURE — 3008F BODY MASS INDEX DOCD: CPT | Mod: CPTII,S$GLB,, | Performed by: NURSE PRACTITIONER

## 2022-01-18 PROCEDURE — 1125F AMNT PAIN NOTED PAIN PRSNT: CPT | Mod: CPTII,S$GLB,, | Performed by: NURSE PRACTITIONER

## 2022-01-18 PROCEDURE — 3288F PR FALLS RISK ASSESSMENT DOCUMENTED: ICD-10-PCS | Mod: CPTII,S$GLB,, | Performed by: NURSE PRACTITIONER

## 2022-01-18 PROCEDURE — 1101F PT FALLS ASSESS-DOCD LE1/YR: CPT | Mod: CPTII,S$GLB,, | Performed by: NURSE PRACTITIONER

## 2022-01-18 PROCEDURE — 1125F PR PAIN SEVERITY QUANTIFIED, PAIN PRESENT: ICD-10-PCS | Mod: CPTII,S$GLB,, | Performed by: NURSE PRACTITIONER

## 2022-01-18 PROCEDURE — 3078F PR MOST RECENT DIASTOLIC BLOOD PRESSURE < 80 MM HG: ICD-10-PCS | Mod: CPTII,S$GLB,, | Performed by: NURSE PRACTITIONER

## 2022-01-18 PROCEDURE — 1159F MED LIST DOCD IN RCRD: CPT | Mod: CPTII,S$GLB,, | Performed by: NURSE PRACTITIONER

## 2022-01-18 PROCEDURE — 3074F PR MOST RECENT SYSTOLIC BLOOD PRESSURE < 130 MM HG: ICD-10-PCS | Mod: CPTII,S$GLB,, | Performed by: NURSE PRACTITIONER

## 2022-01-18 PROCEDURE — 3008F PR BODY MASS INDEX (BMI) DOCUMENTED: ICD-10-PCS | Mod: CPTII,S$GLB,, | Performed by: NURSE PRACTITIONER

## 2022-01-18 PROCEDURE — 99203 OFFICE O/P NEW LOW 30 MIN: CPT | Mod: S$GLB,,, | Performed by: NURSE PRACTITIONER

## 2022-01-18 PROCEDURE — 99203 PR OFFICE/OUTPT VISIT, NEW, LEVL III, 30-44 MIN: ICD-10-PCS | Mod: S$GLB,,, | Performed by: NURSE PRACTITIONER

## 2022-01-18 PROCEDURE — 1101F PR PT FALLS ASSESS DOC 0-1 FALLS W/OUT INJ PAST YR: ICD-10-PCS | Mod: CPTII,S$GLB,, | Performed by: NURSE PRACTITIONER

## 2022-01-18 PROCEDURE — 3078F DIAST BP <80 MM HG: CPT | Mod: CPTII,S$GLB,, | Performed by: NURSE PRACTITIONER

## 2022-01-18 PROCEDURE — 1159F PR MEDICATION LIST DOCUMENTED IN MEDICAL RECORD: ICD-10-PCS | Mod: CPTII,S$GLB,, | Performed by: NURSE PRACTITIONER

## 2022-01-18 PROCEDURE — 3288F FALL RISK ASSESSMENT DOCD: CPT | Mod: CPTII,S$GLB,, | Performed by: NURSE PRACTITIONER

## 2022-01-18 PROCEDURE — 3074F SYST BP LT 130 MM HG: CPT | Mod: CPTII,S$GLB,, | Performed by: NURSE PRACTITIONER

## 2022-01-18 RX ORDER — CETIRIZINE HYDROCHLORIDE 10 MG/1
10 TABLET ORAL DAILY
Qty: 30 TABLET | Refills: 11 | Status: SHIPPED | OUTPATIENT
Start: 2022-01-18 | End: 2023-10-16

## 2022-01-19 ENCOUNTER — IMMUNIZATION (OUTPATIENT)
Dept: PHARMACY | Facility: CLINIC | Age: 67
End: 2022-01-19
Payer: MEDICARE

## 2022-01-19 DIAGNOSIS — Z23 NEED FOR VACCINATION: Primary | ICD-10-CM

## 2022-03-08 ENCOUNTER — OFFICE VISIT (OUTPATIENT)
Dept: FAMILY MEDICINE | Facility: CLINIC | Age: 67
End: 2022-03-08
Payer: MEDICARE

## 2022-03-08 VITALS
SYSTOLIC BLOOD PRESSURE: 104 MMHG | OXYGEN SATURATION: 97 % | TEMPERATURE: 98 F | HEIGHT: 64 IN | WEIGHT: 169.56 LBS | BODY MASS INDEX: 28.95 KG/M2 | DIASTOLIC BLOOD PRESSURE: 62 MMHG | HEART RATE: 77 BPM

## 2022-03-08 DIAGNOSIS — M25.531 CHRONIC PAIN OF RIGHT WRIST: Primary | ICD-10-CM

## 2022-03-08 DIAGNOSIS — M19.049 ARTHROSIS OF HAND: ICD-10-CM

## 2022-03-08 DIAGNOSIS — G89.29 CHRONIC PAIN OF RIGHT WRIST: Primary | ICD-10-CM

## 2022-03-08 DIAGNOSIS — S63.8X1D TEAR OF RIGHT SCAPHOLUNATE LIGAMENT, SUBSEQUENT ENCOUNTER: ICD-10-CM

## 2022-03-08 PROCEDURE — 1159F MED LIST DOCD IN RCRD: CPT | Mod: HCNC,CPTII,S$GLB, | Performed by: FAMILY MEDICINE

## 2022-03-08 PROCEDURE — 3008F PR BODY MASS INDEX (BMI) DOCUMENTED: ICD-10-PCS | Mod: HCNC,CPTII,S$GLB, | Performed by: FAMILY MEDICINE

## 2022-03-08 PROCEDURE — 3074F SYST BP LT 130 MM HG: CPT | Mod: HCNC,CPTII,S$GLB, | Performed by: FAMILY MEDICINE

## 2022-03-08 PROCEDURE — 99214 OFFICE O/P EST MOD 30 MIN: CPT | Mod: HCNC,S$GLB,, | Performed by: FAMILY MEDICINE

## 2022-03-08 PROCEDURE — 1101F PT FALLS ASSESS-DOCD LE1/YR: CPT | Mod: HCNC,CPTII,S$GLB, | Performed by: FAMILY MEDICINE

## 2022-03-08 PROCEDURE — 99214 PR OFFICE/OUTPT VISIT, EST, LEVL IV, 30-39 MIN: ICD-10-PCS | Mod: HCNC,S$GLB,, | Performed by: FAMILY MEDICINE

## 2022-03-08 PROCEDURE — 3074F PR MOST RECENT SYSTOLIC BLOOD PRESSURE < 130 MM HG: ICD-10-PCS | Mod: HCNC,CPTII,S$GLB, | Performed by: FAMILY MEDICINE

## 2022-03-08 PROCEDURE — 1125F PR PAIN SEVERITY QUANTIFIED, PAIN PRESENT: ICD-10-PCS | Mod: HCNC,CPTII,S$GLB, | Performed by: FAMILY MEDICINE

## 2022-03-08 PROCEDURE — 3288F FALL RISK ASSESSMENT DOCD: CPT | Mod: HCNC,CPTII,S$GLB, | Performed by: FAMILY MEDICINE

## 2022-03-08 PROCEDURE — 3078F PR MOST RECENT DIASTOLIC BLOOD PRESSURE < 80 MM HG: ICD-10-PCS | Mod: HCNC,CPTII,S$GLB, | Performed by: FAMILY MEDICINE

## 2022-03-08 PROCEDURE — 3288F PR FALLS RISK ASSESSMENT DOCUMENTED: ICD-10-PCS | Mod: HCNC,CPTII,S$GLB, | Performed by: FAMILY MEDICINE

## 2022-03-08 PROCEDURE — 1159F PR MEDICATION LIST DOCUMENTED IN MEDICAL RECORD: ICD-10-PCS | Mod: HCNC,CPTII,S$GLB, | Performed by: FAMILY MEDICINE

## 2022-03-08 PROCEDURE — 1101F PR PT FALLS ASSESS DOC 0-1 FALLS W/OUT INJ PAST YR: ICD-10-PCS | Mod: HCNC,CPTII,S$GLB, | Performed by: FAMILY MEDICINE

## 2022-03-08 PROCEDURE — 1160F PR REVIEW ALL MEDS BY PRESCRIBER/CLIN PHARMACIST DOCUMENTED: ICD-10-PCS | Mod: HCNC,CPTII,S$GLB, | Performed by: FAMILY MEDICINE

## 2022-03-08 PROCEDURE — 3008F BODY MASS INDEX DOCD: CPT | Mod: HCNC,CPTII,S$GLB, | Performed by: FAMILY MEDICINE

## 2022-03-08 PROCEDURE — 1160F RVW MEDS BY RX/DR IN RCRD: CPT | Mod: HCNC,CPTII,S$GLB, | Performed by: FAMILY MEDICINE

## 2022-03-08 PROCEDURE — 3078F DIAST BP <80 MM HG: CPT | Mod: HCNC,CPTII,S$GLB, | Performed by: FAMILY MEDICINE

## 2022-03-08 PROCEDURE — 99999 PR PBB SHADOW E&M-EST. PATIENT-LVL IV: ICD-10-PCS | Mod: PBBFAC,HCNC,, | Performed by: FAMILY MEDICINE

## 2022-03-08 PROCEDURE — 1125F AMNT PAIN NOTED PAIN PRSNT: CPT | Mod: HCNC,CPTII,S$GLB, | Performed by: FAMILY MEDICINE

## 2022-03-08 PROCEDURE — 99999 PR PBB SHADOW E&M-EST. PATIENT-LVL IV: CPT | Mod: PBBFAC,HCNC,, | Performed by: FAMILY MEDICINE

## 2022-03-08 NOTE — PROGRESS NOTES
"  Physical Exam  /62   Pulse 77   Temp 98.4 °F (36.9 °C) (Oral)   Ht 5' 4" (1.626 m)   Wt 76.9 kg (169 lb 8.5 oz)   SpO2 97%   BMI 29.10 kg/m²      Office Visit    Patient Name: Bossman Kumari    : 1955  MRN: [id number]      Assessment/Plan:  Bossman Kumari is a 66 y.o. female who presents today for :    Chronic pain of right wrist  Arthrosis of hand  Tear of right scapholunate ligament, subsequent encounter  -this is a chronic recurrent issue for patient, refractory to conservative treatment. Advised to continue with wrist splint as well as modifying activities to avoid provocative movements. Icing as needed. Will have her f/u with Hand specialist for further eval. prior XR 2021 reviewed.      Follow up for worsening Sx. Urgent care/ED precautions provided.        This note was created by combination of typed  and MModal dictation.  Transcription errors may be present.  If there are any questions, please contact me.        ----------------------------------------------------------------------------------------------------------------------      HPI:  Patient Care Team:  Shaun Justin Jr., MD as PCP - General (Family Medicine)  Jacqueline Zaman MA (Inactive) as Care Coordinator    Bossman is a 66 y.o. female with      Patient Active Problem List   Diagnosis    DDD (degenerative disc disease), lumbar    GERD (gastroesophageal reflux disease)    Asthma, chronic    Osteoarthritis of hip    Allergic rhinitis    Depression    Plantar fasciitis    Fatty liver    Pes planus of right foot    Hammertoe    Osteoarthritis of knee    Osteoarthritis of ankle    Numbness of toes    Refractive error    Nuclear sclerosis of both eyes    Ptosis, left eyelid       Patient with PMHx as above presents today for follow of chronic Right wrist pain. She had seen general orthopedic for this issue about 7 months ago.  She did have an XR 2021 that showed radiocarpal arthrosis, chronic " scapholunate ligament tear and early SLAC wrist deformity. She was advised to wear wrist splint and take Mobic as needed, for which patient endorses initial improvement but feels that the pain has not gone away. Sometimes, washing the dishes or performing any activity that involves rotating her R wrist causes the pain to be worse. She denies any redness, but does endorse mild joint pain. No radiation of pain nor tingling/numbness/weakness.      Additional ROS    No CP/SOB/palpitations/swelling  No cough/wheezing/SOB  No nausea/vomiting/abd pain  No rash, no history of allergies to any specific substances              Patient Active Problem List   Diagnosis    DDD (degenerative disc disease), lumbar    GERD (gastroesophageal reflux disease)    Asthma, chronic    Osteoarthritis of hip    Allergic rhinitis    Depression    Plantar fasciitis    Fatty liver    Pes planus of right foot    Hammertoe    Osteoarthritis of knee    Osteoarthritis of ankle    Numbness of toes    Refractive error    Nuclear sclerosis of both eyes    Ptosis, left eyelid       Current Medications  Medications reviewed/updated.     Current Outpatient Medications on File Prior to Visit   Medication Sig Dispense Refill    albuterol (PROAIR HFA) 90 mcg/actuation inhaler Inhale 2 puffs into the lungs every 6 (six) hours as needed for Wheezing. 18 g 3    ammonium lactate (LAC-HYDRIN) 12 % lotion Apply to entire body below neck BID 1 Bottle 11    clobetasoL (TEMOVATE) 0.05 % external solution Pt to mix in 1 jar of cerave cream and apply to affected areas bid 50 mL 3    fluticasone propionate (FLONASE) 50 mcg/actuation nasal spray 1 spray (50 mcg total) by Each Nostril route once daily. 16 g 3    loratadine (CLARITIN) 10 mg tablet Take 1 tablet (10 mg total) by mouth 2 (two) times daily as needed (itching). 60 tablet 11    triamcinolone acetonide 0.1% (KENALOG) 0.1 % cream Apply topically 2 (two) times daily. 45 g 3    triamcinolone  acetonide 0.1% (KENALOG) 0.1 % ointment Apply topically 2 (two) times daily. 80 g 0    cetirizine (ZYRTEC) 10 MG tablet Take 1 tablet (10 mg total) by mouth once daily. (Patient not taking: Reported on 3/8/2022) 30 tablet 11    gabapentin (NEURONTIN) 100 MG capsule Take 1 capsule (100 mg total) by mouth 3 (three) times daily. (Patient not taking: Reported on 3/8/2022) 90 capsule 3    hydrOXYzine pamoate (VISTARIL) 50 MG Cap Take 1 capsule (50 mg total) by mouth every 8 (eight) hours as needed (itching). (Patient not taking: Reported on 3/8/2022) 20 capsule 0    meloxicam (MOBIC) 7.5 MG tablet Take 1 tablet (7.5 mg total) by mouth once daily. Take once a day for two weeks then as needed. Take with food (Patient not taking: Reported on 3/8/2022) 30 tablet 0     No current facility-administered medications on file prior to visit.       Past Surgical History:   Procedure Laterality Date    CHOLECYSTECTOMY      TOTAL ABDOMINAL HYSTERECTOMY  1981       Family History   Problem Relation Age of Onset    Hypertension Mother     Diabetes Mother     Cataracts Mother     Hypertension Father     Cataracts Father     No Known Problems Sister     No Known Problems Brother     No Known Problems Maternal Aunt     No Known Problems Maternal Uncle     No Known Problems Paternal Aunt     No Known Problems Paternal Uncle     No Known Problems Maternal Grandmother     No Known Problems Maternal Grandfather     No Known Problems Paternal Grandmother     No Known Problems Paternal Grandfather     Amblyopia Neg Hx     Blindness Neg Hx     Cancer Neg Hx     Glaucoma Neg Hx     Macular degeneration Neg Hx     Retinal detachment Neg Hx     Strabismus Neg Hx     Stroke Neg Hx     Thyroid disease Neg Hx     Melanoma Neg Hx        Social History     Socioeconomic History    Marital status:    Tobacco Use    Smoking status: Never Smoker    Smokeless tobacco: Never Used   Substance and Sexual Activity     "Alcohol use: No    Drug use: No    Sexual activity: Yes     Partners: Male           Allergies   Review of patient's allergies indicates:  No Known Allergies          Review of Systems  See HPI      [unfilled]  /62   Pulse 77   Temp 98.4 °F (36.9 °C) (Oral)   Ht 5' 4" (1.626 m)   Wt 76.9 kg (169 lb 8.5 oz)   SpO2 97%   BMI 29.10 kg/m²     GEN: NAD, pleasant  HEENT: NCAT, PERRLA, EOMI, sclera clear, anicteric  NECK: normal, supple  LUNGS: CTAB, no w/r/r, no increased work of breathing   HEART: RRR, normal S1 and S2, no m/r/g, no edema  ABD: s/nt/nd, NABS  SKIN: normal turgor, no rashes  PSYCH: AOx3, appropriate mood and affect  MSK: warm/well perfused, normal ROM in all extremities except for decreased ROM in R wrist, no redness, +mild edema.                 "

## 2022-08-17 ENCOUNTER — TELEPHONE (OUTPATIENT)
Dept: OPHTHALMOLOGY | Facility: CLINIC | Age: 67
End: 2022-08-17
Payer: MEDICARE

## 2022-08-17 NOTE — TELEPHONE ENCOUNTER
----- Message from Brandee Chacko sent at 8/17/2022  1:58 PM CDT -----  Regarding: Urgent  Contact: pt  Pt can not see out of left eye and need an appt asap. Please call the pt @ [phone].    Pt can not see out of her left eye .

## 2022-09-01 ENCOUNTER — OFFICE VISIT (OUTPATIENT)
Dept: OPTOMETRY | Facility: CLINIC | Age: 67
End: 2022-09-01
Payer: COMMERCIAL

## 2022-09-01 DIAGNOSIS — H52.7 REFRACTIVE ERROR: ICD-10-CM

## 2022-09-01 DIAGNOSIS — Z78.0 ASYMPTOMATIC MENOPAUSAL STATE: ICD-10-CM

## 2022-09-01 DIAGNOSIS — H10.13 ALLERGIC CONJUNCTIVITIS, BILATERAL: ICD-10-CM

## 2022-09-01 DIAGNOSIS — Z01.00 ROUTINE EYE EXAM: Primary | ICD-10-CM

## 2022-09-01 DIAGNOSIS — H25.13 NUCLEAR SCLEROSIS OF BOTH EYES: ICD-10-CM

## 2022-09-01 PROCEDURE — 92015 DETERMINE REFRACTIVE STATE: CPT | Mod: S$GLB,,, | Performed by: OPTOMETRIST

## 2022-09-01 PROCEDURE — 92015 PR REFRACTION: ICD-10-PCS | Mod: S$GLB,,, | Performed by: OPTOMETRIST

## 2022-09-01 PROCEDURE — 92014 PR EYE EXAM, EST PATIENT,COMPREHESV: ICD-10-PCS | Mod: S$GLB,,, | Performed by: OPTOMETRIST

## 2022-09-01 PROCEDURE — 99999 PR PBB SHADOW E&M-EST. PATIENT-LVL II: CPT | Mod: PBBFAC,,, | Performed by: OPTOMETRIST

## 2022-09-01 PROCEDURE — 92014 COMPRE OPH EXAM EST PT 1/>: CPT | Mod: S$GLB,,, | Performed by: OPTOMETRIST

## 2022-09-01 PROCEDURE — 99999 PR PBB SHADOW E&M-EST. PATIENT-LVL II: ICD-10-PCS | Mod: PBBFAC,,, | Performed by: OPTOMETRIST

## 2022-09-01 NOTE — PROGRESS NOTES
Subjective:       Patient ID: Bossman Kumari is a 66 y.o. female      Chief Complaint   Patient presents with    Concerns About Ocular Health     History of Present Illness  Dls: 4/16/21 Dr. Dunne     67 y/o female presents today with c/o blurry vision at distance and near ou.   Pt wears bifocal glasses.     No tearing  + itching   + burning  No pain  No ha's  No floaters  No flashes    Eye meds  None     Assessment/Plan:     1. Routine eye exam  Eyemed    2. Nuclear sclerosis of both eyes  Educated pt on presence of cataracts and effects on vision. No surgery at this time. Recheck in one year, sooner PRN.    3. Allergic conjunctivitis, bilateral  Pataday OU. AT PRN    4. Refractive error  Pt does not want BF, wants two separate pairs of glasses. Educated patient on refractive error and discussed lens options. Dispensed updated spectacle Rx. Educated about adaptation period to new specs.      Eyeglass Final Rx       Eyeglass Final Rx         Sphere Cylinder Axis    Right +1.50 +0.50 180    Left Maxwell +0.75 120      Type: SVL distance    Expiration Date: 9/1/2023              Eyeglass Final Rx #2         Sphere Cylinder Axis    Right +3.50 +0.50 180    Left +2.25 +0.75 120      Type: SVL near    Expiration Date: 9/1/2023                      Follow up in about 1 year (around 9/1/2023).

## 2022-10-09 ENCOUNTER — HOSPITAL ENCOUNTER (EMERGENCY)
Facility: HOSPITAL | Age: 67
Discharge: HOME | End: 2022-10-09
Attending: STUDENT IN AN ORGANIZED HEALTH CARE EDUCATION/TRAINING PROGRAM
Payer: MEDICARE

## 2022-10-09 VITALS
SYSTOLIC BLOOD PRESSURE: 143 MMHG | RESPIRATION RATE: 20 BRPM | OXYGEN SATURATION: 97 % | DIASTOLIC BLOOD PRESSURE: 69 MMHG | BODY MASS INDEX: 25.61 KG/M2 | HEIGHT: 64 IN | TEMPERATURE: 98 F | WEIGHT: 150 LBS | HEART RATE: 78 BPM

## 2022-10-09 DIAGNOSIS — R06.02 SHORTNESS OF BREATH: ICD-10-CM

## 2022-10-09 DIAGNOSIS — R05.1 ACUTE COUGH: Primary | ICD-10-CM

## 2022-10-09 LAB
CTP QC/QA: YES
INFLUENZA A ANTIGEN, POC: NEGATIVE
INFLUENZA B ANTIGEN, POC: NEGATIVE
POC RAPID STREP A: NEGATIVE
SARS-COV-2 RDRP RESP QL NAA+PROBE: NEGATIVE

## 2022-10-09 PROCEDURE — 93005 ELECTROCARDIOGRAM TRACING: CPT | Mod: ER

## 2022-10-09 PROCEDURE — 93010 EKG 12-LEAD: ICD-10-PCS | Mod: ,,, | Performed by: INTERNAL MEDICINE

## 2022-10-09 PROCEDURE — 99284 EMERGENCY DEPT VISIT MOD MDM: CPT | Mod: 25,ER

## 2022-10-09 PROCEDURE — 93010 ELECTROCARDIOGRAM REPORT: CPT | Mod: ,,, | Performed by: INTERNAL MEDICINE

## 2022-10-09 PROCEDURE — 87635 SARS-COV-2 COVID-19 AMP PRB: CPT | Mod: ER | Performed by: STUDENT IN AN ORGANIZED HEALTH CARE EDUCATION/TRAINING PROGRAM

## 2022-10-09 PROCEDURE — 87804 INFLUENZA ASSAY W/OPTIC: CPT | Mod: ER

## 2022-10-09 RX ORDER — BENZONATATE 100 MG/1
100 CAPSULE ORAL 3 TIMES DAILY PRN
Qty: 20 CAPSULE | Refills: 0 | Status: SHIPPED | OUTPATIENT
Start: 2022-10-09 | End: 2022-10-19

## 2022-10-09 RX ORDER — AZITHROMYCIN 250 MG/1
250 TABLET, FILM COATED ORAL DAILY
Qty: 6 TABLET | Refills: 0 | Status: SHIPPED | OUTPATIENT
Start: 2022-10-09 | End: 2023-03-25

## 2022-10-09 NOTE — ED PROVIDER NOTES
"Encounter Date: 10/9/2022       History     Chief Complaint   Patient presents with    Shortness of Breath     Complains of cough, sore throat, and post tussive emesis for 3 days. States tonight she is feeling short of breath and "a little pain in my chest and legs."     HPI    66-year-old female with a past medical history of asthma who presents with a 6 day history of sore throat.  Patient also reporting associated symptoms of hoarseness, a nonproductive cough, and shortness of breath.  It was noted also that patient states that she had chest pain while in triage.  Upon further questioning, patient states that her chest hurts after coughing as well as endorsed post-tussive emesis.  Denies other associated symptoms.  Patient did attempt to treat symptoms at home with her previously prescribed albuterol inhaler and did endorse some relief of her symptoms.  Denies exacerbating factors.    Review of patient's allergies indicates:  No Known Allergies  Past Medical History:   Diagnosis Date    Acute cholecystitis 12/10/2015    Cholelithiasis 12/10/2015    DDD (degenerative disc disease), lumbar     Family history of diabetes mellitus type II 11/11/2015    GERD (gastroesophageal reflux disease)     Nuclear sclerosis of both eyes 6/13/2019    Unspecified asthma, with exacerbation     Urticaria     Vaginal delivery     x4     Past Surgical History:   Procedure Laterality Date    CHOLECYSTECTOMY      TOTAL ABDOMINAL HYSTERECTOMY  1981     Family History   Problem Relation Age of Onset    Hypertension Mother     Diabetes Mother     Cataracts Mother     Hypertension Father     Cataracts Father     No Known Problems Sister     No Known Problems Brother     No Known Problems Maternal Aunt     No Known Problems Maternal Uncle     No Known Problems Paternal Aunt     No Known Problems Paternal Uncle     No Known Problems Maternal Grandmother     No Known Problems Maternal Grandfather     No Known Problems Paternal Grandmother     " No Known Problems Paternal Grandfather     No Known Problems Other     Amblyopia Neg Hx     Blindness Neg Hx     Cancer Neg Hx     Glaucoma Neg Hx     Macular degeneration Neg Hx     Retinal detachment Neg Hx     Strabismus Neg Hx     Stroke Neg Hx     Thyroid disease Neg Hx     Melanoma Neg Hx      Social History     Tobacco Use    Smoking status: Never    Smokeless tobacco: Never   Substance Use Topics    Alcohol use: No    Drug use: No     Review of Systems   Constitutional:  Negative for chills and fever.   HENT:  Positive for sore throat. Negative for drooling and trouble swallowing.    Eyes:  Negative for visual disturbance.   Respiratory:  Positive for cough and shortness of breath.    Cardiovascular:  Negative for chest pain and leg swelling.   Gastrointestinal:  Negative for abdominal pain, nausea and vomiting.   Genitourinary:  Negative for difficulty urinating and dysuria.   Musculoskeletal:  Negative for back pain, myalgias and neck pain.   Skin:  Negative for rash.   Neurological:  Negative for syncope, weakness and headaches.   Psychiatric/Behavioral:  Negative for confusion.      Physical Exam     Initial Vitals [10/09/22 0124]   BP Pulse Resp Temp SpO2   (!) 143/69 81 20 97.7 °F (36.5 °C) 99 %      MAP       --         Physical Exam    Nursing note and vitals reviewed.  Constitutional: She appears well-developed and well-nourished. She is not diaphoretic.   HENT:   Head: Normocephalic and atraumatic.   Right Ear: External ear normal.   Left Ear: External ear normal.   Nose: Nose normal.   Mouth/Throat: Oropharynx is clear and moist. No oropharyngeal exudate.   Eyes: Conjunctivae are normal. No scleral icterus.   Neck: Trachea normal and phonation normal. Neck supple. No stridor present. No JVD present.   Normal range of motion.  Cardiovascular:  Normal rate, regular rhythm, normal heart sounds and intact distal pulses.           Pulmonary/Chest: Breath sounds normal. No respiratory distress. She has  no wheezes. She has no rhonchi. She has no rales.   Abdominal: Abdomen is soft. She exhibits no distension. There is no abdominal tenderness. There is no rebound and no guarding.   Musculoskeletal:         General: No tenderness or edema. Normal range of motion.      Cervical back: Normal range of motion and neck supple. No edema, erythema or rigidity.     Neurological: She is alert. She has normal strength.   Skin: Skin is warm and dry. No rash noted. No erythema.   Psychiatric: She has a normal mood and affect.       ED Course   Procedures  Labs Reviewed   SARS-COV-2 RDRP GENE    Narrative:     This test utilizes isothermal nucleic acid amplification   technology to detect the SARS-CoV-2 RdRp nucleic acid segment.   The analytical sensitivity (limit of detection) is 125 genome   equivalents/mL.   A POSITIVE result implies infection with the SARS-CoV-2 virus;   the patient is presumed to be contagious.     A NEGATIVE result means that SARS-CoV-2 nucleic acids are not   present above the limit of detection. A NEGATIVE result should be   treated as presumptive. It does not rule out the possibility of   COVID-19 and should not be the sole basis for treatment decisions.   If COVID-19 is strongly suspected based on clinical and exposure   history, re-testing using an alternate molecular assay should be   considered.   This test is only for use under the Food and Drug   Administration s Emergency Use Authorization (EUA).   Commercial kits are provided by [employer].   Performance characteristics of the EUA have been independently   verified by [facility] Department of   Pathology and Laboratory Medicine.   _________________________________________________________________   The authorized Fact Sheet for Healthcare Providers and the authorized Fact   Sheet for Patients of the ID NOW COVID-19 are available on the FDA   website:      https://www.fda.gov/media/560232/download  https://www.fda.gov/media/754709/download          POCT STREP A, RAPID   POCT RAPID INFLUENZA A/B     EKG Readings: (Independently Interpreted)   Normal sinus rhythm with a rate of 87 beats per minute, normal axis and intervals, nonspecific T-wave inversions in V1.  No obvious acute ST segment changes.  EKG grossly unchanged when compared to previous EKG from 2017.     Imaging Results              X-Ray Chest AP Portable (Final result)  Result time 10/09/22 01:40:00      Final result by Hiro Landa MD (10/09/22 01:40:00)                   Impression:      No acute process.      Electronically signed by: Hiro Landa MD  Date:    10/09/2022  Time:    01:40               Narrative:    EXAMINATION:  XR CHEST AP PORTABLE    CLINICAL HISTORY:  cough;    TECHNIQUE:  Single frontal view of the chest was performed.    COMPARISON:  12/05/2019.    FINDINGS:  The trachea is unremarkable.  The cardiomediastinal silhouette is within normal limits.  The hilar structures are unremarkable.  There is no evidence of free air beneath the hemidiaphragms.  There are no pleural effusions.  There is no evidence of a pneumothorax.  There is no evidence of pneumomediastinum.  No airspace opacity is present.  There are degenerative changes in the osseous structures.                                       Medications - No data to display  Medical Decision Making:   Clinical Tests:   Lab Tests: Ordered and Reviewed  Radiological Study: Ordered and Reviewed  Medical Tests: Ordered and Reviewed  ED Management:   [facility]  This is an emergent evaluation of a 66-year-old female with a past medical history of asthma who presents with a 6 day history of sore throat. Initial vitals in the ED     BP: (!) 143/69  Pulse: 81  Resp: 20  Temp: 97.7 °F (36.5 °C)  SpO2: 99 % .     Physical exam noted above. DDx includes but is not limited to strep pharyngitis versus viral pharyngitis, COVID, influenza, other viral  illness. Also considered but clinically less likely to be peritonsillar abscess, epiglottitis, angioedema, ACS. Labs and imaging including strep, influenza, COVID, and chest x-ray were obtained. Will continue to monitor and frequently reassess pending results of labs, treatments and final disposition.    Patient is aware of plan and is amenable.     Trina Morales D.O  EMERGENCY MEDICINE  1:58 AM 10/09/2022    UPDATE  Labs unremarkable.  Chest x-ray shows no acute abnormality.  EKG without acute ischemic changes.  Given benign exam and workup, I feel symptoms are less likely due to a life-threatening cause at this time.  Presentation more consistent with a viral illness versus atypical pneumonia.  Will treat with a short course of a or antibiotic as well as Tessalon Perles for cough.  Patient was given ED return precautions and PCP follow-up.  She is aware and agreeable to plan    Trina Morales D.O  EMERGENCY MEDICINE   2:24 AM 10/09/2022       This chart was completed using dictation software, as a result there may be some transcription errors                         Clinical Impression:   Final diagnoses:  [R06.02] Shortness of breath  [R05.1] Acute cough (Primary)        ED Disposition Condition    Discharge Stable          ED Prescriptions       Medication Sig Dispense Start Date End Date Auth. Provider    azithromycin (Z-DUSTY) 250 MG tablet Take 1 tablet (250 mg total) by mouth once daily. Take first 2 tablets together, then 1 every day until finished. 6 tablet 10/9/2022 -- Trina Morales DO    benzonatate (TESSALON) 100 MG capsule Take 1 capsule (100 mg total) by mouth 3 (three) times daily as needed for Cough. 20 capsule 10/9/2022 10/19/2022 Trina Morales DO          Follow-up Information       Follow up With Specialties Details Why Contact Info    Shaun Justin Jr., MD Family Medicine Schedule an appointment as soon as possible for a visit in 3 days Emergency Room  Follow-up [address] [facility]  [place] [address]  [phone]      [facility] ED Emergency Medicine Go to  If symptoms worsen [id number] [address] [phone]             Trina Morales,   10/09/22 0629

## 2022-10-09 NOTE — DISCHARGE INSTRUCTIONS
Thank you for coming to our Emergency Department today. It is important to remember that some problems or medical conditions are difficult to diagnose and may not be found during your Emergency Department visit.     Be sure to follow up with your primary care doctor and review all labs/imaging/tests that were performed during your ER visit with them. Some labs/tests may be outside of the normal range and require non-emergent follow-up and further investigation to help diagnose/exclude/prevent complications or other potentially serious medical conditions that were not addressed during your ER visit.    If you do not have a primary care doctor, you may contact the one listed on your discharge paperwork or you may also call the [facility] Appointment Desk at [phone] to schedule an appointment and establish care with one. It is important to your health that you have a primary care doctor.    Please take all medications as directed. All medications may potentially have side-effects and it is impossible to predict which medications may give you side-effects or what side-effects (if any) they will give you.. If you feel that you are having a negative effect or side-effect of any medication you should immediately stop taking them and seek medical attention. If you feel that you are having a life-threatening reaction call 911.    Return to the ER with any questions/concerns, new/concerning symptoms, worsening or failure to improve.     Do not drive, swim, climb to height, take a bath, operate heavy machinery, drink alcohol or take potentially sedating medications, sign any legal documents or make any important decisions for 24 hours if you have received any pain medications, sedatives or mood altering drugs during your ER visit or within 24 hours of taking them if they have been prescribed to you.     You can find additional resources for Dentists, hearing aids, durable medical equipment, low cost pharmacies and  other resources at https://[facility].org    BELOW THIS LINE ONLY APPLIES IF YOU HAVE A COVID TEST PENDING OR IF YOU HAVE BEEN DIAGNOSED WITH COVID:  Please access [facility] to review the results of your test. Until the results of your COVID test return, you should isolate yourself so as not to potentially spread illness to others.   If your COVID test returns positive, you should isolate yourself so as not to spread illness to others. After five full days, if you are feeling better and you have not had fever for 24 hours, you can return to your typical daily activities, but you must wear a mask around others for an additional 5 days.   If your COVID test returns negative and you are either unvaccinated or more than six months out from your two-dose vaccine and are not yet boosted, you should still quarantine for 5 full days followed by strict mask use for an additional 5 full days.   If your COVID test returns negative and you have received your 2-dose initial vaccine as well as a booster, you should continue strict mask use for 10 full days after the exposure.  For all those exposed, best practice includes a test at day 5 after the exposure. This can be a home test or a test through one of the many testing centers throughout our community.   Masking is always advised to limit the spread of COVID. Cdc.gov is an excellent site to obtain the latest up to date recommendations regarding COVID and COVID testing.     CDC Testing and Quarantine Guidelines for patients with exposure to a known-positive COVID-19 person:  A close exposure is defined as anyone who has had an exposure (masked or unmasked) to a known COVID -19 positive person within 6 feet of someone for a cumulative total of 15 minutes or more over a 24-hour period.   Vaccinated and/or if you recently had a positive covid test within 90 days do NOT need to quarantine after contact with someone who had COVID-19 unless you develop symptoms.   Fully vaccinated  people who have not had a positive test within 90 days, should get tested 3-5 days after their exposure, even if they don't have symptoms and wear a mask indoors in public for 14 days following exposure or until their test result is negative.      Unvaccinated and/or NOT had a positive test within 90 days and meet close exposure  You are required by CDC guidelines to quarantine for at least 5 days from time of exposure followed by 5 days of strict masking. It is recommended, but not required to test after 5 days, unless you develop symptoms, in which case you should test at that time.  If you get tested after 5 days and your test is positive, your 5 day period of isolation starts the day of the positive test.    If your exposure does not meet the above definition, you can return to your normal daily activities to include social distancing, wearing a mask and frequent handwashing.      Here is a link to guidance from the CDC:  https://www.cdc.gov/media/releases/2021/s1227-isolation-quarantine-guidance.html      [place] Dept Of Health Testing Sites:  https://ldh.la.gov/page/3934      [facility] website with testing locations and guidance:  [email or web address]

## 2022-12-19 ENCOUNTER — TELEPHONE (OUTPATIENT)
Dept: FAMILY MEDICINE | Facility: CLINIC | Age: 67
End: 2022-12-19
Payer: MEDICARE

## 2022-12-19 NOTE — TELEPHONE ENCOUNTER
Called pt la nena murillo informed she's due for a annunal exam , offered tomorrow with ARACELI marino . She accept 10:30 am slot .

## 2022-12-19 NOTE — TELEPHONE ENCOUNTER
----- Message from Nelson Anderson MA sent at 12/19/2022 12:42 PM CST -----  Type: Patient Call Back    Who called: Self    What is the request in detail: pt. Is asking for a nurse to call her in regards to her being scheduled to having her liver checked ..     Can the clinic reply by [facility]?No    Would the patient rather a call back or a response via [facility]? yes    Best call back number: [phone] (home)

## 2022-12-20 ENCOUNTER — OFFICE VISIT (OUTPATIENT)
Dept: FAMILY MEDICINE | Facility: CLINIC | Age: 67
End: 2022-12-20
Payer: MEDICARE

## 2022-12-20 VITALS
HEIGHT: 64 IN | TEMPERATURE: 98 F | OXYGEN SATURATION: 99 % | HEART RATE: 62 BPM | DIASTOLIC BLOOD PRESSURE: 70 MMHG | BODY MASS INDEX: 29.17 KG/M2 | SYSTOLIC BLOOD PRESSURE: 110 MMHG | WEIGHT: 170.88 LBS | RESPIRATION RATE: 16 BRPM

## 2022-12-20 DIAGNOSIS — Z12.31 ENCOUNTER FOR SCREENING MAMMOGRAM FOR MALIGNANT NEOPLASM OF BREAST: ICD-10-CM

## 2022-12-20 DIAGNOSIS — J30.9 ALLERGIC RHINITIS, UNSPECIFIED SEASONALITY, UNSPECIFIED TRIGGER: ICD-10-CM

## 2022-12-20 DIAGNOSIS — R20.2 PARESTHESIA OF LEFT LEG: ICD-10-CM

## 2022-12-20 DIAGNOSIS — Z12.11 COLON CANCER SCREENING: ICD-10-CM

## 2022-12-20 DIAGNOSIS — L29.9 ITCHING: Primary | ICD-10-CM

## 2022-12-20 DIAGNOSIS — R05.9 COUGH IN ADULT PATIENT: ICD-10-CM

## 2022-12-20 PROCEDURE — 1160F PR REVIEW ALL MEDS BY PRESCRIBER/CLIN PHARMACIST DOCUMENTED: ICD-10-PCS | Mod: HCNC,CPTII,S$GLB, | Performed by: NURSE PRACTITIONER

## 2022-12-20 PROCEDURE — 99999 PR PBB SHADOW E&M-EST. PATIENT-LVL V: ICD-10-PCS | Mod: PBBFAC,HCNC,, | Performed by: NURSE PRACTITIONER

## 2022-12-20 PROCEDURE — 1160F RVW MEDS BY RX/DR IN RCRD: CPT | Mod: HCNC,CPTII,S$GLB, | Performed by: NURSE PRACTITIONER

## 2022-12-20 PROCEDURE — 99999 PR PBB SHADOW E&M-EST. PATIENT-LVL V: CPT | Mod: PBBFAC,HCNC,, | Performed by: NURSE PRACTITIONER

## 2022-12-20 PROCEDURE — 1125F AMNT PAIN NOTED PAIN PRSNT: CPT | Mod: HCNC,CPTII,S$GLB, | Performed by: NURSE PRACTITIONER

## 2022-12-20 PROCEDURE — 3074F SYST BP LT 130 MM HG: CPT | Mod: HCNC,CPTII,S$GLB, | Performed by: NURSE PRACTITIONER

## 2022-12-20 PROCEDURE — 1159F PR MEDICATION LIST DOCUMENTED IN MEDICAL RECORD: ICD-10-PCS | Mod: HCNC,CPTII,S$GLB, | Performed by: NURSE PRACTITIONER

## 2022-12-20 PROCEDURE — 3074F PR MOST RECENT SYSTOLIC BLOOD PRESSURE < 130 MM HG: ICD-10-PCS | Mod: HCNC,CPTII,S$GLB, | Performed by: NURSE PRACTITIONER

## 2022-12-20 PROCEDURE — 3078F DIAST BP <80 MM HG: CPT | Mod: HCNC,CPTII,S$GLB, | Performed by: NURSE PRACTITIONER

## 2022-12-20 PROCEDURE — 1159F MED LIST DOCD IN RCRD: CPT | Mod: HCNC,CPTII,S$GLB, | Performed by: NURSE PRACTITIONER

## 2022-12-20 PROCEDURE — 3288F FALL RISK ASSESSMENT DOCD: CPT | Mod: HCNC,CPTII,S$GLB, | Performed by: NURSE PRACTITIONER

## 2022-12-20 PROCEDURE — 3288F PR FALLS RISK ASSESSMENT DOCUMENTED: ICD-10-PCS | Mod: HCNC,CPTII,S$GLB, | Performed by: NURSE PRACTITIONER

## 2022-12-20 PROCEDURE — 1125F PR PAIN SEVERITY QUANTIFIED, PAIN PRESENT: ICD-10-PCS | Mod: HCNC,CPTII,S$GLB, | Performed by: NURSE PRACTITIONER

## 2022-12-20 PROCEDURE — 3078F PR MOST RECENT DIASTOLIC BLOOD PRESSURE < 80 MM HG: ICD-10-PCS | Mod: HCNC,CPTII,S$GLB, | Performed by: NURSE PRACTITIONER

## 2022-12-20 PROCEDURE — 1101F PR PT FALLS ASSESS DOC 0-1 FALLS W/OUT INJ PAST YR: ICD-10-PCS | Mod: HCNC,CPTII,S$GLB, | Performed by: NURSE PRACTITIONER

## 2022-12-20 PROCEDURE — 3008F PR BODY MASS INDEX (BMI) DOCUMENTED: ICD-10-PCS | Mod: HCNC,CPTII,S$GLB, | Performed by: NURSE PRACTITIONER

## 2022-12-20 PROCEDURE — 3008F BODY MASS INDEX DOCD: CPT | Mod: HCNC,CPTII,S$GLB, | Performed by: NURSE PRACTITIONER

## 2022-12-20 PROCEDURE — 1101F PT FALLS ASSESS-DOCD LE1/YR: CPT | Mod: HCNC,CPTII,S$GLB, | Performed by: NURSE PRACTITIONER

## 2022-12-20 PROCEDURE — 99214 PR OFFICE/OUTPT VISIT, EST, LEVL IV, 30-39 MIN: ICD-10-PCS | Mod: HCNC,S$GLB,, | Performed by: NURSE PRACTITIONER

## 2022-12-20 PROCEDURE — 99214 OFFICE O/P EST MOD 30 MIN: CPT | Mod: HCNC,S$GLB,, | Performed by: NURSE PRACTITIONER

## 2022-12-20 RX ORDER — BENZONATATE 200 MG/1
200 CAPSULE ORAL 3 TIMES DAILY PRN
Qty: 30 CAPSULE | Refills: 0 | Status: SHIPPED | OUTPATIENT
Start: 2022-12-20 | End: 2022-12-30

## 2022-12-20 RX ORDER — FLUTICASONE PROPIONATE 50 MCG
1 SPRAY, SUSPENSION (ML) NASAL DAILY
Qty: 15.8 ML | Refills: 0 | Status: SHIPPED | OUTPATIENT
Start: 2022-12-20 | End: 2023-03-25

## 2022-12-20 NOTE — PROGRESS NOTES
Routine Office Visit    Patient Name: Bossman Kumari    : 1955  MRN: [id number]    Chief Complaint:  Itching, cough, allergies, tingling    Subjective:  Bossman is a 67 y.o. female who presents today for:    1. Itching, cough, allergies, tingling - patient who is new to me with the below documented medical history reports today for evaluation.  She has been having itching of her arms and legs for number of months and years now.  She states she has seen multiple specialists but nothing has helped.  She would like to get her liver and kidneys checked today because she read on Google that itching could be caused by liver disease.  Review of her chart shows that she has had imaging which showed fatty liver.  She reports that 1 of her immediate family members did have liver cancer.    She is been having tingling of the entire left foot for over a year now.  Gabapentin does not helped.  She would like a referral to a specialist for this.  The tingling seems to worsen at night.  She denies any claudication.    She has also been coughing for the last 4 weeks.  Two months ago she was diagnosed with a respiratory infection and the cough had resolved at that time, but return 4 weeks ago.  She denies any shortness of breath, wheezing, fevers, or chills.  She does deal with some rhinitis symptoms occasionally.  She would like a refill of Tessalon for the cough.    Past Medical History  Past Medical History:   Diagnosis Date    Acute cholecystitis 12/10/2015    Cholelithiasis 12/10/2015    DDD (degenerative disc disease), lumbar     Family history of diabetes mellitus type II 2015    GERD (gastroesophageal reflux disease)     Nuclear sclerosis of both eyes 2019    Unspecified asthma, with exacerbation     Urticaria     Vaginal delivery     x4       Past Surgical History  Past Surgical History:   Procedure Laterality Date    CHOLECYSTECTOMY      TOTAL ABDOMINAL HYSTERECTOMY         Family History  Family  History   Problem Relation Age of Onset    Hypertension Mother     Diabetes Mother     Cataracts Mother     Hypertension Father     Cataracts Father     No Known Problems Sister     No Known Problems Brother     No Known Problems Maternal Aunt     No Known Problems Maternal Uncle     No Known Problems Paternal Aunt     No Known Problems Paternal Uncle     No Known Problems Maternal Grandmother     No Known Problems Maternal Grandfather     No Known Problems Paternal Grandmother     No Known Problems Paternal Grandfather     No Known Problems Other     Amblyopia Neg Hx     Blindness Neg Hx     Cancer Neg Hx     Glaucoma Neg Hx     Macular degeneration Neg Hx     Retinal detachment Neg Hx     Strabismus Neg Hx     Stroke Neg Hx     Thyroid disease Neg Hx     Melanoma Neg Hx        Social History  Social History     Socioeconomic History    Marital status:    Tobacco Use    Smoking status: Never    Smokeless tobacco: Never   Substance and Sexual Activity    Alcohol use: No    Drug use: No    Sexual activity: Yes     Partners: Male       Current Medications  Current Outpatient Medications on File Prior to Visit   Medication Sig Dispense Refill    albuterol (PROAIR HFA) 90 mcg/actuation inhaler Inhale 2 puffs into the lungs every 6 (six) hours as needed for Wheezing. (Patient not taking: Reported on 10/9/2022) 18 g 3    ammonium lactate (LAC-HYDRIN) 12 % lotion Apply to entire body below neck BID (Patient not taking: Reported on 10/9/2022) 1 Bottle 11    azithromycin (Z-DUSTY) 250 MG tablet Take 1 tablet (250 mg total) by mouth once daily. Take first 2 tablets together, then 1 every day until finished. (Patient not taking: Reported on 12/20/2022) 6 tablet 0    cetirizine (ZYRTEC) 10 MG tablet Take 1 tablet (10 mg total) by mouth once daily. (Patient not taking: Reported on 12/20/2022) 30 tablet 11     No current facility-administered medications on file prior to visit.       Allergies   Review of patient's allergies  "indicates:  No Known Allergies    Review of Systems (Pertinent positives)  Review of Systems   Constitutional:  Negative for chills and fever.   HENT:  Positive for congestion. Negative for sinus pain and sore throat.    Eyes: Negative.    Respiratory:  Positive for cough and sputum production. Negative for hemoptysis, shortness of breath, wheezing and stridor.    Cardiovascular: Negative.    Gastrointestinal: Negative.    Genitourinary: Negative.    Musculoskeletal: Negative.    Skin:  Positive for itching. Negative for rash.   Neurological: Negative.    Endo/Heme/Allergies: Negative.    Psychiatric/Behavioral: Negative.       /70   Pulse 62   Temp 98 °F (36.7 °C) (Oral)   Resp 16   Ht 5' 4" (1.626 m)   Wt 77.5 kg (170 lb 13.7 oz)   SpO2 99%   BMI 29.33 kg/m²     Physical Exam  Vitals reviewed.   Constitutional:       General: She is not in acute distress.     Appearance: Normal appearance. She is not ill-appearing, toxic-appearing or diaphoretic.   HENT:      Head: Normocephalic and atraumatic.   Cardiovascular:      Rate and Rhythm: Normal rate and regular rhythm.      Pulses: Normal pulses.      Heart sounds: Normal heart sounds.   Pulmonary:      Effort: Pulmonary effort is normal. No respiratory distress.      Breath sounds: Normal breath sounds. No wheezing.   Abdominal:      General: Bowel sounds are normal. There is no distension.      Palpations: Abdomen is soft.      Tenderness: There is no abdominal tenderness.   Musculoskeletal:         General: No swelling, tenderness or deformity. Normal range of motion.   Skin:     General: Skin is warm and dry.      Capillary Refill: Capillary refill takes less than 2 seconds.   Neurological:      General: No focal deficit present.      Mental Status: She is alert and oriented to person, place, and time.   Psychiatric:         Mood and Affect: Mood normal.         Behavior: Behavior normal.        Assessment/Plan:  Bossman Kumari is a 67 y.o. female " who presents today for :    Bossman was seen today for itching and hand pain.    Diagnoses and all orders for this visit:    Itching  -     HEPATIC FUNCTION PANEL; Future  -     COMPREHENSIVE METABOLIC PANEL; Future    Chronic problem.  Will check labs per patient's request.  Recommended continued evaluation with dermatology and allergy.    Paresthesia of left leg  -     Ambulatory referral/consult to Neurology; Future    Referral to neuro placed.    Encounter for screening mammogram for malignant neoplasm of breast  -     Mammo Digital Screening Bilat w/ Raman; Future    Mammogram order written.    Colon cancer screening  -     Ambulatory referral/consult to Endo Procedure ; Future    Order for endo procedure schedule written.    Allergic rhinitis, unspecified seasonality, unspecified trigger  -     fluticasone propionate (FLONASE) 50 mcg/actuation nasal spray; 1 spray (50 mcg total) by Each Nostril route once daily.    Flonase refilled.    Cough in adult patient  -     X-Ray Chest PA And Lateral; Future  -     benzonatate (TESSALON) 200 MG capsule; Take 1 capsule (200 mg total) by mouth 3 (three) times daily as needed for Cough.    X4 weeks.  Potentially due to allergies.  Check x-ray.  Tessalon refilled.  If no improvement, may need referral to pulmonology.        This office note has been dictated.  This dictation has been generated using M-Modal Fluency Direct dictation; some phonetic errors may occur.   My collaborating physician is Dr. Wyatt Ortiz.

## 2022-12-21 ENCOUNTER — TELEPHONE (OUTPATIENT)
Dept: FAMILY MEDICINE | Facility: CLINIC | Age: 67
End: 2022-12-21
Payer: MEDICARE

## 2022-12-21 NOTE — TELEPHONE ENCOUNTER
Patient verbalized understanding when relayed message that chest x-ray was normal.  She is inquiring about her bloodwork

## 2022-12-21 NOTE — TELEPHONE ENCOUNTER
----- Message from Raúl Mcgee NP sent at 12/21/2022  9:15 AM CST -----  Please call patient and let her know her x-ray is normal.  Thank you

## 2022-12-29 ENCOUNTER — TELEPHONE (OUTPATIENT)
Dept: NEUROLOGY | Facility: CLINIC | Age: 67
End: 2022-12-29
Payer: MEDICARE

## 2022-12-29 NOTE — TELEPHONE ENCOUNTER
----- Message from Clau Fatima sent at 12/29/2022  9:04 AM CST -----  Type: Patient Call Back    Who called: Self     What is the request in detail: PT asking to be scheduled as soon as she can says she was referred to see a Neurologist due to the nerves in her leg from a previous seizure     Can the clinic reply by [facility]? No     Would the patient rather a call back or a response via [facility]? Call     Best call back number: [phone]           She is booked on 2/24 with Dr Gotti

## 2023-01-03 ENCOUNTER — TELEPHONE (OUTPATIENT)
Dept: OPTOMETRY | Facility: CLINIC | Age: 68
End: 2023-01-03
Payer: MEDICARE

## 2023-01-03 NOTE — TELEPHONE ENCOUNTER
----- Message from Keesha Dunne OD sent at 1/3/2023  8:14 AM CST -----  Contact: Bossman [phone]  Dr. Casas is not taking new pts. Pt can contact [facility] for evaluation. [phone]    ----- Message -----  From: Sayda Alonzo MA  Sent: 12/20/2022  11:25 AM CST  To: Keesha Dunne OD      ----- Message -----  From: Darin Steven  Sent: 12/20/2022   9:08 AM CST  To: [facility] Staff    Pt is calling because she is requesting a referral to see Dr. Casas about her eyelids. Please notify after referral is sent.

## 2023-02-06 ENCOUNTER — CLINICAL SUPPORT (OUTPATIENT)
Dept: ENDOSCOPY | Facility: HOSPITAL | Age: 68
End: 2023-02-06
Payer: MEDICARE

## 2023-02-06 ENCOUNTER — TELEPHONE (OUTPATIENT)
Dept: ENDOSCOPY | Facility: HOSPITAL | Age: 68
End: 2023-02-06

## 2023-02-06 VITALS — BODY MASS INDEX: 25.71 KG/M2 | HEIGHT: 66 IN | WEIGHT: 160 LBS

## 2023-02-06 DIAGNOSIS — Z12.11 COLON CANCER SCREENING: ICD-10-CM

## 2023-02-06 RX ORDER — POLYETHYLENE GLYCOL 3350, SODIUM SULFATE ANHYDROUS, SODIUM BICARBONATE, SODIUM CHLORIDE, POTASSIUM CHLORIDE 236; 22.74; 6.74; 5.86; 2.97 G/4L; G/4L; G/4L; G/4L; G/4L
4 POWDER, FOR SOLUTION ORAL ONCE
Qty: 4000 ML | Refills: 0 | Status: SHIPPED | OUTPATIENT
Start: 2023-02-06 | End: 2023-02-06

## 2023-02-07 ENCOUNTER — TELEPHONE (OUTPATIENT)
Dept: OPHTHALMOLOGY | Facility: CLINIC | Age: 68
End: 2023-02-07
Payer: MEDICARE

## 2023-02-07 NOTE — TELEPHONE ENCOUNTER
----- Message from Marbin Pope sent at 2/7/2023 12:05 PM CST -----  Regarding: Urgent Appt  Pt called stating that she would like to be seen as soon as possible for blurry vision     Contact Bossman [phone]

## 2023-02-16 DIAGNOSIS — J45.30 ASTHMA, CHRONIC, MILD PERSISTENT, UNCOMPLICATED: ICD-10-CM

## 2023-02-16 DIAGNOSIS — L30.8 PRURITIC DERMATITIS: ICD-10-CM

## 2023-02-16 NOTE — TELEPHONE ENCOUNTER
Care Due:                  Date            Visit Type   Department     Provider  --------------------------------------------------------------------------------                                [facility]                              PRIMARY      MEDICINE/  Last Visit: 10-      CARE ([facility])   INTERNAL MED   Emery Sorensen  Next Visit: None Scheduled  None         None Found                                                            Last  Test          Frequency    Reason                     Performed    Due Date  --------------------------------------------------------------------------------    Office Visit  12 months..  albuterol................  10-   10-    [facility] Embedded Care Gaps. Reference number: [id number]. 2/16/2023   9:21:02 AM CST

## 2023-02-17 RX ORDER — ALBUTEROL SULFATE 90 UG/1
AEROSOL, METERED RESPIRATORY (INHALATION)
Qty: 18 G | Refills: 3 | OUTPATIENT
Start: 2023-02-17

## 2023-02-17 RX ORDER — TRIAMCINOLONE ACETONIDE 1 MG/G
CREAM TOPICAL
Qty: 45 G | Refills: 3 | OUTPATIENT
Start: 2023-02-17

## 2023-02-18 NOTE — TELEPHONE ENCOUNTER
Patient has not seen me since March 2021 (almost 2 years) and has cancelled last 2 visits with me. I cannot continue to berkley medications until she sees me. Would need to schedule appt ASAP

## 2023-02-23 ENCOUNTER — PATIENT OUTREACH (OUTPATIENT)
Dept: ADMINISTRATIVE | Facility: HOSPITAL | Age: 68
End: 2023-02-23
Payer: MEDICARE

## 2023-02-23 DIAGNOSIS — L29.9 PRURITUS: ICD-10-CM

## 2023-02-23 DIAGNOSIS — L85.3 XEROSIS CUTIS: ICD-10-CM

## 2023-02-24 ENCOUNTER — OFFICE VISIT (OUTPATIENT)
Dept: NEUROLOGY | Facility: CLINIC | Age: 68
End: 2023-02-24
Payer: MEDICARE

## 2023-02-24 VITALS
HEIGHT: 66 IN | WEIGHT: 171.06 LBS | BODY MASS INDEX: 27.49 KG/M2 | SYSTOLIC BLOOD PRESSURE: 116 MMHG | OXYGEN SATURATION: 95 % | HEART RATE: 69 BPM | DIASTOLIC BLOOD PRESSURE: 68 MMHG

## 2023-02-24 DIAGNOSIS — R20.2 PARESTHESIA OF LEFT LEG: Primary | ICD-10-CM

## 2023-02-24 DIAGNOSIS — M51.9 LUMBAR DISC DISEASE: ICD-10-CM

## 2023-02-24 PROCEDURE — 3078F PR MOST RECENT DIASTOLIC BLOOD PRESSURE < 80 MM HG: ICD-10-PCS | Mod: HCNC,CPTII,S$GLB, | Performed by: STUDENT IN AN ORGANIZED HEALTH CARE EDUCATION/TRAINING PROGRAM

## 2023-02-24 PROCEDURE — 3288F FALL RISK ASSESSMENT DOCD: CPT | Mod: HCNC,CPTII,S$GLB, | Performed by: STUDENT IN AN ORGANIZED HEALTH CARE EDUCATION/TRAINING PROGRAM

## 2023-02-24 PROCEDURE — 3288F PR FALLS RISK ASSESSMENT DOCUMENTED: ICD-10-PCS | Mod: HCNC,CPTII,S$GLB, | Performed by: STUDENT IN AN ORGANIZED HEALTH CARE EDUCATION/TRAINING PROGRAM

## 2023-02-24 PROCEDURE — 1159F PR MEDICATION LIST DOCUMENTED IN MEDICAL RECORD: ICD-10-PCS | Mod: HCNC,CPTII,S$GLB, | Performed by: STUDENT IN AN ORGANIZED HEALTH CARE EDUCATION/TRAINING PROGRAM

## 2023-02-24 PROCEDURE — 99204 PR OFFICE/OUTPT VISIT, NEW, LEVL IV, 45-59 MIN: ICD-10-PCS | Mod: HCNC,S$GLB,, | Performed by: STUDENT IN AN ORGANIZED HEALTH CARE EDUCATION/TRAINING PROGRAM

## 2023-02-24 PROCEDURE — 99999 PR PBB SHADOW E&M-EST. PATIENT-LVL III: CPT | Mod: PBBFAC,HCNC,, | Performed by: STUDENT IN AN ORGANIZED HEALTH CARE EDUCATION/TRAINING PROGRAM

## 2023-02-24 PROCEDURE — 99204 OFFICE O/P NEW MOD 45 MIN: CPT | Mod: HCNC,S$GLB,, | Performed by: STUDENT IN AN ORGANIZED HEALTH CARE EDUCATION/TRAINING PROGRAM

## 2023-02-24 PROCEDURE — 3074F PR MOST RECENT SYSTOLIC BLOOD PRESSURE < 130 MM HG: ICD-10-PCS | Mod: HCNC,CPTII,S$GLB, | Performed by: STUDENT IN AN ORGANIZED HEALTH CARE EDUCATION/TRAINING PROGRAM

## 2023-02-24 PROCEDURE — 3008F BODY MASS INDEX DOCD: CPT | Mod: HCNC,CPTII,S$GLB, | Performed by: STUDENT IN AN ORGANIZED HEALTH CARE EDUCATION/TRAINING PROGRAM

## 2023-02-24 PROCEDURE — 1101F PT FALLS ASSESS-DOCD LE1/YR: CPT | Mod: HCNC,CPTII,S$GLB, | Performed by: STUDENT IN AN ORGANIZED HEALTH CARE EDUCATION/TRAINING PROGRAM

## 2023-02-24 PROCEDURE — 3008F PR BODY MASS INDEX (BMI) DOCUMENTED: ICD-10-PCS | Mod: HCNC,CPTII,S$GLB, | Performed by: STUDENT IN AN ORGANIZED HEALTH CARE EDUCATION/TRAINING PROGRAM

## 2023-02-24 PROCEDURE — 3078F DIAST BP <80 MM HG: CPT | Mod: HCNC,CPTII,S$GLB, | Performed by: STUDENT IN AN ORGANIZED HEALTH CARE EDUCATION/TRAINING PROGRAM

## 2023-02-24 PROCEDURE — 1126F PR PAIN SEVERITY QUANTIFIED, NO PAIN PRESENT: ICD-10-PCS | Mod: HCNC,CPTII,S$GLB, | Performed by: STUDENT IN AN ORGANIZED HEALTH CARE EDUCATION/TRAINING PROGRAM

## 2023-02-24 PROCEDURE — 3074F SYST BP LT 130 MM HG: CPT | Mod: HCNC,CPTII,S$GLB, | Performed by: STUDENT IN AN ORGANIZED HEALTH CARE EDUCATION/TRAINING PROGRAM

## 2023-02-24 PROCEDURE — 99999 PR PBB SHADOW E&M-EST. PATIENT-LVL III: ICD-10-PCS | Mod: PBBFAC,HCNC,, | Performed by: STUDENT IN AN ORGANIZED HEALTH CARE EDUCATION/TRAINING PROGRAM

## 2023-02-24 PROCEDURE — 1101F PR PT FALLS ASSESS DOC 0-1 FALLS W/OUT INJ PAST YR: ICD-10-PCS | Mod: HCNC,CPTII,S$GLB, | Performed by: STUDENT IN AN ORGANIZED HEALTH CARE EDUCATION/TRAINING PROGRAM

## 2023-02-24 PROCEDURE — 1126F AMNT PAIN NOTED NONE PRSNT: CPT | Mod: HCNC,CPTII,S$GLB, | Performed by: STUDENT IN AN ORGANIZED HEALTH CARE EDUCATION/TRAINING PROGRAM

## 2023-02-24 PROCEDURE — 1159F MED LIST DOCD IN RCRD: CPT | Mod: HCNC,CPTII,S$GLB, | Performed by: STUDENT IN AN ORGANIZED HEALTH CARE EDUCATION/TRAINING PROGRAM

## 2023-02-24 RX ORDER — AMMONIUM LACTATE 12 G/100G
LOTION TOPICAL
Qty: 1 EACH | Refills: 0 | Status: SHIPPED | OUTPATIENT
Start: 2023-02-24 | End: 2023-10-16

## 2023-02-24 NOTE — PROGRESS NOTES
Chief Complaint and Duration     L leg paraesthesias     History of Present Illness     Bossman Kumari is a 67 y.o. female with a history of multiple medical diagnoses as listed below that presents for itching in L foot and numbness, tingling, burning pain that starts in back of knee and radiates down L foot. Does not want to try gabapentin again, states it didn't stop the tingling (was on 100mg TID). Not interested in trying other medications for neuropathic pain.    States she continues to have this neuropathic pain only affecting her L side. Denies any weakness. Denies any significant back pain.     Has fluid in her feet in which she is addressing this week.    Review of Systems: (positive bolded)  Constitutional: Negative for fatigue, activity change, fevers, or chills.   HENT:  Negative for new visual disturbances or difficulty swallowing.    Respiratory:  Negative for shortness of breath.    Cardiovascular:  Negative for palpitations.   Gastrointestinal:  Negative for constipation, nausea, or vomiting.   Endocrine: Negative for temperature instability/intolerance.   Genitourinary:  Negative for difficulty urinating.   Musculoskeletal:  Negative for neck pain, back pain, myalgias, joint swelling, or gait problem.  Skin:  Negative for rash or lesions.   Neurological:  Negative for seizures, headaches, dizziness, tremors, syncope, speech difficulty, weakness, light-headedness, or numbness.   Hematological:  Does not bruise/bleed easily.   Psychiatric/Behavioral: Negative for decreased concentration or sleep disturbance.    Review of patient's allergies indicates:  No Known Allergies  Current Outpatient Medications   Medication Sig Dispense Refill    ammonium lactate (LAC-HYDRIN) 12 % lotion Apply to entire body below neck BID 1 Bottle 11    fluticasone propionate (FLONASE) 50 mcg/actuation nasal spray 1 spray (50 mcg total) by Each Nostril route once daily. 15.8 mL 0    albuterol (PROAIR HFA) 90 mcg/actuation  inhaler Inhale 2 puffs into the lungs every 6 (six) hours as needed for Wheezing. (Patient not taking: Reported on 10/9/2022) 18 g 3    azithromycin (Z-DUSTY) 250 MG tablet Take 1 tablet (250 mg total) by mouth once daily. Take first 2 tablets together, then 1 every day until finished. (Patient not taking: Reported on 12/20/2022) 6 tablet 0    cetirizine (ZYRTEC) 10 MG tablet Take 1 tablet (10 mg total) by mouth once daily. (Patient not taking: Reported on 12/20/2022) 30 tablet 11     No current facility-administered medications for this visit.       Medical History     Past Medical History:   Diagnosis Date    Acute cholecystitis 12/10/2015    Cholelithiasis 12/10/2015    DDD (degenerative disc disease), lumbar     Family history of diabetes mellitus type II 11/11/2015    GERD (gastroesophageal reflux disease)     Nuclear sclerosis of both eyes 6/13/2019    Unspecified asthma, with exacerbation     Urticaria     Vaginal delivery     x4     Past Surgical History:   Procedure Laterality Date    CHOLECYSTECTOMY      TOTAL ABDOMINAL HYSTERECTOMY  1981     Family History   Problem Relation Age of Onset    Hypertension Mother     Diabetes Mother     Cataracts Mother     Hypertension Father     Cataracts Father     No Known Problems Sister     No Known Problems Brother     No Known Problems Maternal Aunt     No Known Problems Maternal Uncle     No Known Problems Paternal Aunt     No Known Problems Paternal Uncle     No Known Problems Maternal Grandmother     No Known Problems Maternal Grandfather     No Known Problems Paternal Grandmother     No Known Problems Paternal Grandfather     No Known Problems Other     Amblyopia Neg Hx     Blindness Neg Hx     Cancer Neg Hx     Glaucoma Neg Hx     Macular degeneration Neg Hx     Retinal detachment Neg Hx     Strabismus Neg Hx     Stroke Neg Hx     Thyroid disease Neg Hx     Melanoma Neg Hx      Social History     Socioeconomic History    Marital status:    Tobacco Use     Smoking status: Never    Smokeless tobacco: Never   Substance and Sexual Activity    Alcohol use: No    Drug use: No    Sexual activity: Yes     Partners: Male       Exam     Vitals:    02/24/23 0808   BP: 116/68   Pulse: 69      Physical Exam:  General: She is not in acute distress. She is not ill-appearing.   HENT: Normocephalic and atraumatic. Moist mucous membranes.  Eyes: Conjunctivae normal.   Pulmonary: Pulmonary effort is normal.   Abdominal: Abdomen is soft and flat.   Skin: Skin is warm and dry. No rashes.   Psychiatric: Mood normal.        Neurologic Exam   Mental status: oriented to person, place, and time  Attention: Normal. Concentration: normal.  Speech: speech is normal.  Cranial Nerves: PERRL, EOMI intact, V1-V3 Facial sensation intact. Symmetric facies. Hearing grossly intact. Palate and uvula midline, symmetric. No tongue deviation. Trapezius strength intact.     Motor exam: bulk and tone normal. Strength 5/5 in bilateral upper extremities: deltoids, biceps, triceps, wrist flexion/extension, finger abduction/adduction. Strength 5/5 in bilateral lower extremities: hip flexion/extension, thigh adduction/abduction, knee flexion/extension, dorsiflexion/plantarflexion, foot eversion/inversion.    Reflexes: 2+ in bilateral upper extremities: biceps and brachiaradialis, 2+ in bilateral lower extremities: patellar   Plantar reflex: normal  Yoder's: negative    Sensory exam: light touch intact    Gait exam: normal  Romberg: negative  Coordination: normal   Tremor: none    Labs and Imaging     Imaging: reviewed  4/2015 MRI lumbar spine w multilevel disc degeneration at L4-L5 and L5-S1      Assessment and Plan     Problem List Items Addressed This Visit    None  Visit Diagnoses       Paresthesia of left leg    -  Primary    Relevant Orders    EMG W/ ULTRASOUND AND NERVE CONDUCTION TEST 2 Extremities    Lumbar disc disease        Relevant Orders    EMG W/ ULTRASOUND AND NERVE CONDUCTION TEST 2 Extremities           67 year old female w hx of lumbar disc degeneration, states she gets numbness and tingling behind the L knee down to her L foot. Denies any weakness. Also has itching in her L leg. Does not want to try neuropathic pain medications. Will get NCS/EMG to assess for a superficial peroneal nerve damage or other sensory nerve damage.    Follow-up: on NCS/EMG

## 2023-02-24 NOTE — TELEPHONE ENCOUNTER
Patient has not seen me in 2 years and cancelled last few appts with me. This will be only refill I sent, with no others, until she sees me, so should keep scheduled appt

## 2023-02-27 ENCOUNTER — LAB VISIT (OUTPATIENT)
Dept: LAB | Facility: HOSPITAL | Age: 68
End: 2023-02-27
Attending: FAMILY MEDICINE
Payer: MEDICARE

## 2023-02-27 ENCOUNTER — OFFICE VISIT (OUTPATIENT)
Dept: FAMILY MEDICINE | Facility: CLINIC | Age: 68
End: 2023-02-27
Payer: MEDICARE

## 2023-02-27 VITALS
HEART RATE: 77 BPM | DIASTOLIC BLOOD PRESSURE: 66 MMHG | OXYGEN SATURATION: 97 % | WEIGHT: 167.13 LBS | SYSTOLIC BLOOD PRESSURE: 122 MMHG | HEIGHT: 66 IN | TEMPERATURE: 98 F | BODY MASS INDEX: 26.86 KG/M2

## 2023-02-27 DIAGNOSIS — Z12.12 SCREENING FOR COLORECTAL CANCER: ICD-10-CM

## 2023-02-27 DIAGNOSIS — I70.0 AORTIC ATHEROSCLEROSIS: Chronic | ICD-10-CM

## 2023-02-27 DIAGNOSIS — F33.0 MILD EPISODE OF RECURRENT MAJOR DEPRESSIVE DISORDER: Chronic | ICD-10-CM

## 2023-02-27 DIAGNOSIS — Z12.31 VISIT FOR SCREENING MAMMOGRAM: ICD-10-CM

## 2023-02-27 DIAGNOSIS — H25.13 NUCLEAR SCLEROSIS OF BOTH EYES: Chronic | ICD-10-CM

## 2023-02-27 DIAGNOSIS — Z78.0 POSTMENOPAUSAL: Chronic | ICD-10-CM

## 2023-02-27 DIAGNOSIS — M19.031 PRIMARY OSTEOARTHRITIS OF BOTH WRISTS: Chronic | ICD-10-CM

## 2023-02-27 DIAGNOSIS — R07.9 CHEST PAIN, UNSPECIFIED TYPE: ICD-10-CM

## 2023-02-27 DIAGNOSIS — M19.032 PRIMARY OSTEOARTHRITIS OF BOTH WRISTS: Chronic | ICD-10-CM

## 2023-02-27 DIAGNOSIS — Z13.6 ENCOUNTER FOR LIPID SCREENING FOR CARDIOVASCULAR DISEASE: ICD-10-CM

## 2023-02-27 DIAGNOSIS — M17.0 PRIMARY OSTEOARTHRITIS OF BOTH KNEES: Chronic | ICD-10-CM

## 2023-02-27 DIAGNOSIS — Z12.11 SCREENING FOR COLORECTAL CANCER: ICD-10-CM

## 2023-02-27 DIAGNOSIS — Z13.220 ENCOUNTER FOR LIPID SCREENING FOR CARDIOVASCULAR DISEASE: ICD-10-CM

## 2023-02-27 DIAGNOSIS — K76.0 FATTY LIVER: ICD-10-CM

## 2023-02-27 DIAGNOSIS — Z00.00 ROUTINE MEDICAL EXAM: ICD-10-CM

## 2023-02-27 DIAGNOSIS — K13.79 ORAL PAIN: ICD-10-CM

## 2023-02-27 DIAGNOSIS — K76.0 FATTY LIVER: Chronic | ICD-10-CM

## 2023-02-27 DIAGNOSIS — Z79.1 PATIENT TAKES NSAID (NON-STEROID ANTI-INFLAMMATORY DRUG): ICD-10-CM

## 2023-02-27 DIAGNOSIS — Z00.00 ROUTINE MEDICAL EXAM: Primary | ICD-10-CM

## 2023-02-27 PROBLEM — H52.7 REFRACTIVE ERROR: Status: RESOLVED | Noted: 2019-06-13 | Resolved: 2023-02-27

## 2023-02-27 LAB
A1AT SERPL-MCNC: 132 MG/DL (ref 100–190)
ALBUMIN SERPL BCP-MCNC: 3.8 G/DL (ref 3.5–5.2)
ALP SERPL-CCNC: 123 U/L (ref 55–135)
ALT SERPL W/O P-5'-P-CCNC: 23 U/L (ref 10–44)
ANION GAP SERPL CALC-SCNC: 10 MMOL/L (ref 8–16)
AST SERPL-CCNC: 31 U/L (ref 10–40)
BASOPHILS # BLD AUTO: 0.02 K/UL (ref 0–0.2)
BASOPHILS NFR BLD: 0.4 % (ref 0–1.9)
BILIRUB SERPL-MCNC: 0.4 MG/DL (ref 0.1–1)
BUN SERPL-MCNC: 12 MG/DL (ref 8–23)
CALCIUM SERPL-MCNC: 9.3 MG/DL (ref 8.7–10.5)
CERULOPLASMIN SERPL-MCNC: 38 MG/DL (ref 15–45)
CHLORIDE SERPL-SCNC: 109 MMOL/L (ref 95–110)
CHOLEST SERPL-MCNC: 114 MG/DL (ref 120–199)
CHOLEST/HDLC SERPL: 3.8 {RATIO} (ref 2–5)
CO2 SERPL-SCNC: 24 MMOL/L (ref 23–29)
CREAT SERPL-MCNC: 1.1 MG/DL (ref 0.5–1.4)
DIFFERENTIAL METHOD: NORMAL
EOSINOPHIL # BLD AUTO: 0.2 K/UL (ref 0–0.5)
EOSINOPHIL NFR BLD: 3.5 % (ref 0–8)
ERYTHROCYTE [DISTWIDTH] IN BLOOD BY AUTOMATED COUNT: 13.4 % (ref 11.5–14.5)
EST. GFR  (NO RACE VARIABLE): 55 ML/MIN/1.73 M^2
FERRITIN SERPL-MCNC: 61 NG/ML (ref 20–300)
GLUCOSE SERPL-MCNC: 94 MG/DL (ref 70–110)
HCT VFR BLD AUTO: 41.8 % (ref 37–48.5)
HDLC SERPL-MCNC: 30 MG/DL (ref 40–75)
HDLC SERPL: 26.3 % (ref 20–50)
HGB BLD-MCNC: 13.6 G/DL (ref 12–16)
IGG SERPL-MCNC: 1417 MG/DL (ref 650–1600)
IMM GRANULOCYTES # BLD AUTO: 0.01 K/UL (ref 0–0.04)
IMM GRANULOCYTES NFR BLD AUTO: 0.2 % (ref 0–0.5)
IRON SERPL-MCNC: 68 UG/DL (ref 30–160)
LDLC SERPL CALC-MCNC: 73 MG/DL (ref 63–159)
LYMPHOCYTES # BLD AUTO: 1.9 K/UL (ref 1–4.8)
LYMPHOCYTES NFR BLD: 40.8 % (ref 18–48)
MCH RBC QN AUTO: 29.2 PG (ref 27–31)
MCHC RBC AUTO-ENTMCNC: 32.5 G/DL (ref 32–36)
MCV RBC AUTO: 90 FL (ref 82–98)
MONOCYTES # BLD AUTO: 0.6 K/UL (ref 0.3–1)
MONOCYTES NFR BLD: 12.1 % (ref 4–15)
NEUTROPHILS # BLD AUTO: 2 K/UL (ref 1.8–7.7)
NEUTROPHILS NFR BLD: 43 % (ref 38–73)
NONHDLC SERPL-MCNC: 84 MG/DL
NRBC BLD-RTO: 0 /100 WBC
PLATELET # BLD AUTO: 251 K/UL (ref 150–450)
PMV BLD AUTO: 11.4 FL (ref 9.2–12.9)
POTASSIUM SERPL-SCNC: 3.6 MMOL/L (ref 3.5–5.1)
PROT SERPL-MCNC: 7.8 G/DL (ref 6–8.4)
RBC # BLD AUTO: 4.65 M/UL (ref 4–5.4)
SATURATED IRON: 18 % (ref 20–50)
SODIUM SERPL-SCNC: 143 MMOL/L (ref 136–145)
TOTAL IRON BINDING CAPACITY: 383 UG/DL (ref 250–450)
TRANSFERRIN SERPL-MCNC: 259 MG/DL (ref 200–375)
TRIGL SERPL-MCNC: 55 MG/DL (ref 30–150)
WBC # BLD AUTO: 4.56 K/UL (ref 3.9–12.7)

## 2023-02-27 PROCEDURE — 80321 ALCOHOLS BIOMARKERS 1OR 2: CPT | Performed by: FAMILY MEDICINE

## 2023-02-27 PROCEDURE — 93005 EKG 12-LEAD: ICD-10-PCS | Mod: HCNC,S$GLB,, | Performed by: FAMILY MEDICINE

## 2023-02-27 PROCEDURE — 99397 PER PM REEVAL EST PAT 65+ YR: CPT | Mod: HCNC,S$GLB,, | Performed by: FAMILY MEDICINE

## 2023-02-27 PROCEDURE — 3008F BODY MASS INDEX DOCD: CPT | Mod: HCNC,CPTII,S$GLB, | Performed by: FAMILY MEDICINE

## 2023-02-27 PROCEDURE — 99397 PR PREVENTIVE VISIT,EST,65 & OVER: ICD-10-PCS | Mod: HCNC,S$GLB,, | Performed by: FAMILY MEDICINE

## 2023-02-27 PROCEDURE — 1160F RVW MEDS BY RX/DR IN RCRD: CPT | Mod: HCNC,CPTII,S$GLB, | Performed by: FAMILY MEDICINE

## 2023-02-27 PROCEDURE — 3288F PR FALLS RISK ASSESSMENT DOCUMENTED: ICD-10-PCS | Mod: HCNC,CPTII,S$GLB, | Performed by: FAMILY MEDICINE

## 2023-02-27 PROCEDURE — 3078F DIAST BP <80 MM HG: CPT | Mod: HCNC,CPTII,S$GLB, | Performed by: FAMILY MEDICINE

## 2023-02-27 PROCEDURE — 99999 PR PBB SHADOW E&M-EST. PATIENT-LVL V: CPT | Mod: PBBFAC,HCNC,, | Performed by: FAMILY MEDICINE

## 2023-02-27 PROCEDURE — 99999 PR PBB SHADOW E&M-EST. PATIENT-LVL V: ICD-10-PCS | Mod: PBBFAC,HCNC,, | Performed by: FAMILY MEDICINE

## 2023-02-27 PROCEDURE — 82784 ASSAY IGA/IGD/IGG/IGM EACH: CPT | Performed by: FAMILY MEDICINE

## 2023-02-27 PROCEDURE — 1126F PR PAIN SEVERITY QUANTIFIED, NO PAIN PRESENT: ICD-10-PCS | Mod: HCNC,CPTII,S$GLB, | Performed by: FAMILY MEDICINE

## 2023-02-27 PROCEDURE — 1159F PR MEDICATION LIST DOCUMENTED IN MEDICAL RECORD: ICD-10-PCS | Mod: HCNC,CPTII,S$GLB, | Performed by: FAMILY MEDICINE

## 2023-02-27 PROCEDURE — 3074F SYST BP LT 130 MM HG: CPT | Mod: HCNC,CPTII,S$GLB, | Performed by: FAMILY MEDICINE

## 2023-02-27 PROCEDURE — 3078F PR MOST RECENT DIASTOLIC BLOOD PRESSURE < 80 MM HG: ICD-10-PCS | Mod: HCNC,CPTII,S$GLB, | Performed by: FAMILY MEDICINE

## 2023-02-27 PROCEDURE — 93010 EKG 12-LEAD: ICD-10-PCS | Mod: HCNC,S$GLB,, | Performed by: INTERNAL MEDICINE

## 2023-02-27 PROCEDURE — 86704 HEP B CORE ANTIBODY TOTAL: CPT | Performed by: FAMILY MEDICINE

## 2023-02-27 PROCEDURE — 1159F MED LIST DOCD IN RCRD: CPT | Mod: HCNC,CPTII,S$GLB, | Performed by: FAMILY MEDICINE

## 2023-02-27 PROCEDURE — 84466 ASSAY OF TRANSFERRIN: CPT | Performed by: FAMILY MEDICINE

## 2023-02-27 PROCEDURE — 3074F PR MOST RECENT SYSTOLIC BLOOD PRESSURE < 130 MM HG: ICD-10-PCS | Mod: HCNC,CPTII,S$GLB, | Performed by: FAMILY MEDICINE

## 2023-02-27 PROCEDURE — 1101F PT FALLS ASSESS-DOCD LE1/YR: CPT | Mod: HCNC,CPTII,S$GLB, | Performed by: FAMILY MEDICINE

## 2023-02-27 PROCEDURE — 86790 VIRUS ANTIBODY NOS: CPT | Performed by: FAMILY MEDICINE

## 2023-02-27 PROCEDURE — 82390 ASSAY OF CERULOPLASMIN: CPT | Performed by: FAMILY MEDICINE

## 2023-02-27 PROCEDURE — 86381 MITOCHONDRIAL ANTIBODY EACH: CPT | Performed by: FAMILY MEDICINE

## 2023-02-27 PROCEDURE — 36415 COLL VENOUS BLD VENIPUNCTURE: CPT | Mod: PN | Performed by: FAMILY MEDICINE

## 2023-02-27 PROCEDURE — 86803 HEPATITIS C AB TEST: CPT | Performed by: FAMILY MEDICINE

## 2023-02-27 PROCEDURE — 82103 ALPHA-1-ANTITRYPSIN TOTAL: CPT | Performed by: FAMILY MEDICINE

## 2023-02-27 PROCEDURE — 93005 ELECTROCARDIOGRAM TRACING: CPT | Mod: HCNC,S$GLB,, | Performed by: FAMILY MEDICINE

## 2023-02-27 PROCEDURE — 3288F FALL RISK ASSESSMENT DOCD: CPT | Mod: HCNC,CPTII,S$GLB, | Performed by: FAMILY MEDICINE

## 2023-02-27 PROCEDURE — 3008F PR BODY MASS INDEX (BMI) DOCUMENTED: ICD-10-PCS | Mod: HCNC,CPTII,S$GLB, | Performed by: FAMILY MEDICINE

## 2023-02-27 PROCEDURE — 1160F PR REVIEW ALL MEDS BY PRESCRIBER/CLIN PHARMACIST DOCUMENTED: ICD-10-PCS | Mod: HCNC,CPTII,S$GLB, | Performed by: FAMILY MEDICINE

## 2023-02-27 PROCEDURE — 80061 LIPID PANEL: CPT | Performed by: FAMILY MEDICINE

## 2023-02-27 PROCEDURE — 86709 HEPATITIS A IGM ANTIBODY: CPT | Performed by: FAMILY MEDICINE

## 2023-02-27 PROCEDURE — 93010 ELECTROCARDIOGRAM REPORT: CPT | Mod: HCNC,S$GLB,, | Performed by: INTERNAL MEDICINE

## 2023-02-27 PROCEDURE — 80053 COMPREHEN METABOLIC PANEL: CPT | Performed by: FAMILY MEDICINE

## 2023-02-27 PROCEDURE — 86706 HEP B SURFACE ANTIBODY: CPT | Mod: 91 | Performed by: FAMILY MEDICINE

## 2023-02-27 PROCEDURE — 1101F PR PT FALLS ASSESS DOC 0-1 FALLS W/OUT INJ PAST YR: ICD-10-PCS | Mod: HCNC,CPTII,S$GLB, | Performed by: FAMILY MEDICINE

## 2023-02-27 PROCEDURE — 86038 ANTINUCLEAR ANTIBODIES: CPT | Performed by: FAMILY MEDICINE

## 2023-02-27 PROCEDURE — 82728 ASSAY OF FERRITIN: CPT | Performed by: FAMILY MEDICINE

## 2023-02-27 PROCEDURE — 1126F AMNT PAIN NOTED NONE PRSNT: CPT | Mod: HCNC,CPTII,S$GLB, | Performed by: FAMILY MEDICINE

## 2023-02-27 PROCEDURE — 85025 COMPLETE CBC W/AUTO DIFF WBC: CPT | Performed by: FAMILY MEDICINE

## 2023-02-27 RX ORDER — LIDOCAINE HYDROCHLORIDE 20 MG/ML
SOLUTION OROPHARYNGEAL
Qty: 100 ML | Refills: 0 | Status: SHIPPED | OUTPATIENT
Start: 2023-02-27 | End: 2023-03-11

## 2023-02-27 RX ORDER — DULOXETIN HYDROCHLORIDE 30 MG/1
30 CAPSULE, DELAYED RELEASE ORAL DAILY
Qty: 30 CAPSULE | Refills: 1 | Status: SHIPPED | OUTPATIENT
Start: 2023-02-27 | End: 2023-04-10 | Stop reason: SDUPTHER

## 2023-02-27 RX ORDER — CHLORHEXIDINE GLUCONATE ORAL RINSE 1.2 MG/ML
15 SOLUTION DENTAL
COMMUNITY
Start: 2023-02-19 | End: 2023-03-07

## 2023-02-27 RX ORDER — POLYETHYLENE GLYCOL-3350 AND ELECTROLYTES 236; 6.74; 5.86; 2.97; 22.74 G/274.31G; G/274.31G; G/274.31G; G/274.31G; G/274.31G
POWDER, FOR SOLUTION ORAL
COMMUNITY
Start: 2023-02-18 | End: 2023-03-25

## 2023-02-27 RX ORDER — ATORVASTATIN CALCIUM 10 MG/1
10 TABLET, FILM COATED ORAL DAILY
Qty: 90 TABLET | Refills: 0 | Status: SHIPPED | OUTPATIENT
Start: 2023-02-27 | End: 2023-04-10 | Stop reason: SDUPTHER

## 2023-02-27 NOTE — PROGRESS NOTES
Subjective:       Patient ID: Bossman Kumari is a 67 y.o. female.    Chief Complaint: Annual Exam    67-year-old female comes in for annual exam.  She has been seeing an orthopedic provider at [facility] for arthritis of her wrist and knees.  She reports that provider told her that she needed some type of surgery on her wrist to help with the arthritis, however the patient is not too sure on the details of this.  She does not bring any reports about this.  The patient also reports that she does have mild depression.  She states that she feels down several days a week.  Depression has been an ongoing issue for many years.  She is currently not taking any medications for this.    In addition review of chart shows history of a fatty liver diagnosed in 2015.  She is unaware of any further workup on this.  Review tried also shows imaging demonstrating aortic atherosclerosis.  She is not taking any medications for this.    Recently the patient has been using Advil on a daily basis to manage the arthritic pain.  This was not prescribed.  Patient also reports that in the last several weeks she has been having recurring substernal chest pain.  She states that it lasts for several minutes and then goes away.  It is not related to exertion.  She denies any anxiety during these episodes.    Review of Systems   Constitutional:  Negative for unexpected weight change.   HENT:  Negative for ear pain, mouth sores (but reports having oral pain for which she went to urgent care and was given chlorhexidine oral rinse.  Hhe is taking it for over a week with no improvement.) and sore throat.    Eyes:  Negative for visual disturbance.   Respiratory:  Negative for shortness of breath.    Cardiovascular:  Negative for chest pain.   Gastrointestinal:  Negative for abdominal pain and blood in stool.   Endocrine: Negative for cold intolerance and heat intolerance.   Genitourinary:  Negative for dysuria and frequency.    Musculoskeletal:  Positive for arthralgias, back pain, leg pain and myalgias.   Integumentary:  Negative for rash.   Neurological:  Negative for weakness, numbness and headaches.   Hematological:  Negative for adenopathy.   Psychiatric/Behavioral:  Positive for decreased concentration, dysphoric mood and sleep disturbance. Negative for hallucinations, self-injury and suicidal ideas. The patient is not nervous/anxious.        Objective:      Physical Exam  Vitals reviewed.   Constitutional:       General: She is not in acute distress.  HENT:      Head: Normocephalic and atraumatic.      Right Ear: Ear canal and external ear normal.      Left Ear: Ear canal and external ear normal.      Nose: Nose normal.      Mouth/Throat:      Lips: No lesions.      Mouth: Mucous membranes are moist.      Dentition: No gingival swelling or gum lesions.      Pharynx: No oropharyngeal exudate or posterior oropharyngeal erythema.      Comments: Patient is not wearing her dentures today  Eyes:      Extraocular Movements: Extraocular movements intact.      Conjunctiva/sclera: Conjunctivae normal.      Pupils: Pupils are equal, round, and reactive to light.   Cardiovascular:      Rate and Rhythm: Normal rate and regular rhythm.      Pulses: Normal pulses.      Heart sounds: Normal heart sounds.   Pulmonary:      Effort: Pulmonary effort is normal. No respiratory distress.      Breath sounds: No wheezing or rales.   Abdominal:      General: Abdomen is flat. Bowel sounds are normal. There is no distension.      Palpations: Abdomen is soft.      Tenderness: There is no abdominal tenderness. There is no guarding.   Musculoskeletal:      Cervical back: Normal range of motion. No rigidity or tenderness.   Lymphadenopathy:      Cervical: No cervical adenopathy.   Skin:     General: Skin is warm.      Capillary Refill: Capillary refill takes less than 2 seconds.   Neurological:      Mental Status: She is alert and oriented to person, place, and  time.      Cranial Nerves: No cranial nerve deficit.      Sensory: No sensory deficit.   Psychiatric:         Mood and Affect: Mood normal.         Behavior: Behavior normal.       Assessment:       1. Routine medical exam    2. Encounter for lipid screening for cardiovascular disease    3. Screening for colorectal cancer    4. Visit for screening mammogram    5. Postmenopausal    6. Aortic atherosclerosis    7. Chest pain, unspecified type    8. Patient takes NSAID (non-steroid anti-inflammatory drug)    9. Nuclear sclerosis of both eyes    10. Mild episode of recurrent major depressive disorder    11. Primary osteoarthritis of both wrists    12. Primary osteoarthritis of both knees    13. Fatty liver    14. Oral pain        Plan:       1. Routine medical exam  Comments:  Age appropriate recommendations and screenings were reviewed with the patient.  Orders:  -     Comprehensive Metabolic Panel; Future; Expected date: 02/27/2023  -     CBC Auto Differential; Future; Expected date: 02/27/2023  -     Lipid Panel; Future; Expected date: 02/27/2023    2. Encounter for lipid screening for cardiovascular disease  Comments:  Check lipid panel  Orders:  -     Lipid Panel; Future; Expected date: 02/27/2023    3. Screening for colorectal cancer  Comments:  Patient is already scheduled for colonoscopy for next month    4. Visit for screening mammogram  Comments:  Screening mammogram scheduled  Orders:  -     Mammo Digital Screening Bilat w/ Raman; Future; Expected date: 02/27/2023    5. Postmenopausal  -     DXA Bone Density Axial Skeleton 1 or more sites; Future; Expected date: 02/27/2023    6. Aortic atherosclerosis  Overview:  12-  Chest two views: Heart size is normal.  There is aortic plaque    Assessment & Plan:  Will start low dose atorvastatin and recheck labs in 4 weeks    Orders:  -     Lipid Panel; Future; Expected date: 03/27/2023  -     Hepatic Function Panel; Future; Expected date: 03/27/2023  -      atorvastatin (LIPITOR) 10 MG tablet; Take 1 tablet (10 mg total) by mouth once daily.  Dispense: 90 tablet; Refill: 0    7. Chest pain, unspecified type  Assessment & Plan:  Check EKG today.  Follow-up in the next few weeks.  If pain returns will refer to Cardiology.    Orders:  -     IN OFFICE EKG 12-LEAD (to Muse)    8. Patient takes NSAID (non-steroid anti-inflammatory drug)  Comments:  Discussed with patient that nonsteroidal anti-inflammatories are generally not been for long-term use.  Advised to stop medication.    9. Nuclear sclerosis of both eyes  Assessment & Plan:  Referral for optometrist placed.      Orders:  -     Ambulatory referral/consult to Optometry; Future; Expected date: 03/06/2023    10. Mild episode of recurrent major depressive disorder  Assessment & Plan:  Will start on a once daily Cymbalta 30 milligrams, and re-evaluate patient in four weeks.    Side effects discussed.        Orders:  -     DULoxetine (CYMBALTA) 30 MG capsule; Take 1 capsule (30 mg total) by mouth once daily.  Dispense: 30 capsule; Refill: 1    11. Primary osteoarthritis of both wrists  Assessment & Plan:  Will try to get notes from a orthopedic provider.    Discussed using Cymbalta for musculoskeletal pain.    Orders:  -     DULoxetine (CYMBALTA) 30 MG capsule; Take 1 capsule (30 mg total) by mouth once daily.  Dispense: 30 capsule; Refill: 1    12. Primary osteoarthritis of both knees  Assessment & Plan:  Discussed using Cymbalta for musculoskeletal pain.    Orders:  -     DULoxetine (CYMBALTA) 30 MG capsule; Take 1 capsule (30 mg total) by mouth once daily.  Dispense: 30 capsule; Refill: 1    13. Fatty liver  Assessment & Plan:  Fatty liver serology work up ordered as well as a ultrasound of the liver for further evaluation.        Orders:  -     Comprehensive Metabolic Panel; Future; Expected date: 02/27/2023  -     CBC Auto Differential; Future; Expected date: 02/27/2023  -     US Abdomen Limited; Future; Expected  date: 02/27/2023  -     Alpha-1-Antitrypsin; Future; Expected date: 02/27/2023  -     KOKO Screen w/Reflex; Future; Expected date: 02/27/2023  -     Antimitochondrial Antibody; Future; Expected date: 02/27/2023  -     Ceruloplasmin; Future; Expected date: 02/27/2023  -     Ferritin; Future; Expected date: 02/27/2023  -     Hepatitis A antibody, IgG; Future; Expected date: 02/27/2023  -     Hepatitis B Core Antibody, Total; Future; Expected date: 02/27/2023  -     IgG; Future; Expected date: 02/27/2023  -     Hepatitis B Surface Ab, Qualitative; Future; Expected date: 02/27/2023  -     Iron and TIBC; Future; Expected date: 02/27/2023  -     Phosphatidylethanol (PETH); Future; Expected date: 02/27/2023  -     HEPATITIS A ANTIBODY, IGM; Future; Expected date: 02/27/2023  -     Hepatitis C Antibody; Future; Expected date: 02/27/2023    14. Oral pain  Comments:  Short course of oral viscous lidocaine prescribed, however patient was advised to make an appointment with her dentist as soon as she can.  Orders:  -     LIDOcaine HCl 2% (LIDOCAINE VISCOUS) 2 % Soln; 10 ml orally, swish for 30 seconds then spit, every 4 hours as needed for pain  Dispense: 100 mL; Refill: 0

## 2023-02-27 NOTE — PATIENT INSTRUCTIONS
Please call [phone] to schedule your eye care appointment      The atorvastatin is for the cholesterol to help lower your risk of a heart attack    The Duloxetine is to help with the joint and nerve pains as well as depression

## 2023-02-27 NOTE — ASSESSMENT & PLAN NOTE
Fatty liver serology work up ordered as well as a ultrasound of the liver for further evaluation.

## 2023-02-27 NOTE — ASSESSMENT & PLAN NOTE
Will try to get notes from a orthopedic provider.    Discussed using Cymbalta for musculoskeletal pain.

## 2023-02-27 NOTE — ASSESSMENT & PLAN NOTE
Will start on a once daily Cymbalta 30 milligrams, and re-evaluate patient in four weeks.    Side effects discussed.

## 2023-02-28 LAB
HAV IGG SER QL IA: NORMAL
HAV IGM SERPL QL IA: NORMAL
HBV CORE AB SERPL QL IA: NORMAL
HCV AB SERPL QL IA: NORMAL
MITOCHONDRIA AB TITR SER IF: NORMAL {TITER}

## 2023-03-01 LAB — ANA SER QL IF: NORMAL

## 2023-03-02 LAB
CLINICAL BIOCHEMIST REVIEW: NORMAL
PLPETH BLD-MCNC: <10 NG/ML
POPETH BLD-MCNC: <10 NG/ML

## 2023-03-03 LAB
HBV SURFACE AB SER-ACNC: <3 MIU/ML
HBV SURFACE AB SER-ACNC: NORMAL M[IU]/ML

## 2023-03-07 ENCOUNTER — PROCEDURE VISIT (OUTPATIENT)
Dept: NEUROLOGY | Facility: CLINIC | Age: 68
End: 2023-03-07
Payer: MEDICARE

## 2023-03-07 DIAGNOSIS — M51.36 DDD (DEGENERATIVE DISC DISEASE), LUMBAR: Primary | ICD-10-CM

## 2023-03-07 DIAGNOSIS — R20.2 PARESTHESIA OF LEFT LEG: ICD-10-CM

## 2023-03-07 PROCEDURE — 95911 NRV CNDJ TEST 9-10 STUDIES: CPT | Mod: HCNC,S$GLB,, | Performed by: STUDENT IN AN ORGANIZED HEALTH CARE EDUCATION/TRAINING PROGRAM

## 2023-03-07 PROCEDURE — 95886 MUSC TEST DONE W/N TEST COMP: CPT | Mod: HCNC,S$GLB,, | Performed by: STUDENT IN AN ORGANIZED HEALTH CARE EDUCATION/TRAINING PROGRAM

## 2023-03-07 PROCEDURE — 95886 PR EMG COMPLETE, W/ NERVE CONDUCTION STUDIES, 5+ MUSCLES: ICD-10-PCS | Mod: HCNC,S$GLB,, | Performed by: STUDENT IN AN ORGANIZED HEALTH CARE EDUCATION/TRAINING PROGRAM

## 2023-03-07 PROCEDURE — 95911 PR NERVE CONDUCTION STUDY; 9-10 STUDIES: ICD-10-PCS | Mod: HCNC,S$GLB,, | Performed by: STUDENT IN AN ORGANIZED HEALTH CARE EDUCATION/TRAINING PROGRAM

## 2023-03-07 PROCEDURE — 99214 PR OFFICE/OUTPT VISIT, EST, LEVL IV, 30-39 MIN: ICD-10-PCS | Mod: HCNC,S$GLB,, | Performed by: STUDENT IN AN ORGANIZED HEALTH CARE EDUCATION/TRAINING PROGRAM

## 2023-03-07 PROCEDURE — 99214 OFFICE O/P EST MOD 30 MIN: CPT | Mod: HCNC,S$GLB,, | Performed by: STUDENT IN AN ORGANIZED HEALTH CARE EDUCATION/TRAINING PROGRAM

## 2023-03-07 RX ORDER — GABAPENTIN 300 MG/1
CAPSULE ORAL
Qty: 90 CAPSULE | Refills: 5 | Status: SHIPPED | OUTPATIENT
Start: 2023-03-07 | End: 2023-10-16

## 2023-03-07 NOTE — PROCEDURES
Procedures    Chief Complaint and Duration     L leg paraesthesias     History of Present Illness     Bossman Kumari is a 67 y.o. female with a history of multiple medical diagnoses as listed below that presents for itching in L foot and numbness, tingling, burning pain that starts in back of knee and radiates down L foot. Does not want to try gabapentin again, states it didn't stop the tingling (was on 100mg TID). Not interested in trying other medications for neuropathic pain.    States she continues to have this neuropathic pain only affecting her L side. Denies any weakness. Denies any significant back pain.     Has fluid in her feet in which she is addressing this week.    Interim period: states now the numbness tingling also occurring on the R side. Here for NCS/EMG. States gabapentin did not do anything for her, was on small dose of 100mg TID.     Review of Systems: (positive bolded)  Constitutional: Negative for fatigue, activity change, fevers, or chills.   HENT:  Negative for new visual disturbances or difficulty swallowing.    Respiratory:  Negative for shortness of breath.    Cardiovascular:  Negative for palpitations.   Gastrointestinal:  Negative for constipation, nausea, or vomiting.   Endocrine: Negative for temperature instability/intolerance.   Genitourinary:  Negative for difficulty urinating.   Musculoskeletal:  Negative for neck pain, back pain, myalgias, joint swelling, or gait problem.  Skin:  Negative for rash or lesions.   Neurological:  Negative for seizures, headaches, dizziness, tremors, syncope, speech difficulty, weakness, light-headedness, or numbness.   Hematological:  Does not bruise/bleed easily.   Psychiatric/Behavioral: Negative for decreased concentration or sleep disturbance.    Review of patient's allergies indicates:  No Known Allergies  Current Outpatient Medications   Medication Sig Dispense Refill    albuterol (PROAIR HFA) 90 mcg/actuation inhaler Inhale 2 puffs into the  lungs every 6 (six) hours as needed for Wheezing. (Patient not taking: Reported on 10/9/2022) 18 g 3    ammonium lactate (LAC-HYDRIN) 12 % lotion Apply to entire body below neck BID 1 each 0    atorvastatin (LIPITOR) 10 MG tablet Take 1 tablet (10 mg total) by mouth once daily. 90 tablet 0    azithromycin (Z-DUSTY) 250 MG tablet Take 1 tablet (250 mg total) by mouth once daily. Take first 2 tablets together, then 1 every day until finished. 6 tablet 0    cetirizine (ZYRTEC) 10 MG tablet Take 1 tablet (10 mg total) by mouth once daily. (Patient not taking: Reported on 12/20/2022) 30 tablet 11    chlorhexidine (PERIDEX) 0.12 % solution 15 mLs.      DULoxetine (CYMBALTA) 30 MG capsule Take 1 capsule (30 mg total) by mouth once daily. 30 capsule 1    fluticasone propionate (FLONASE) 50 mcg/actuation nasal spray 1 spray (50 mcg total) by Each Nostril route once daily. 15.8 mL 0    GABAPENTIN, BULK, MISC by Misc.(Non-Drug; Combo Route) route.      GAVILYTE-G 236-22.74-6.74 -5.86 gram suspension       LIDOcaine HCl 2% (LIDOCAINE VISCOUS) 2 % Soln 10 ml orally, swish for 30 seconds then spit, every 4 hours as needed for pain 100 mL 0     No current facility-administered medications for this visit.       Medical History     Past Medical History:   Diagnosis Date    Acute cholecystitis 12/10/2015    Cholelithiasis 12/10/2015    DDD (degenerative disc disease), lumbar     Family history of diabetes mellitus type II 11/11/2015    GERD (gastroesophageal reflux disease)     Nuclear sclerosis of both eyes 6/13/2019    Unspecified asthma, with exacerbation     Urticaria     Vaginal delivery     x4     Past Surgical History:   Procedure Laterality Date    CHOLECYSTECTOMY      TOTAL ABDOMINAL HYSTERECTOMY  1981     Family History   Problem Relation Age of Onset    Hypertension Mother     Diabetes Mother     Cataracts Mother     Hypertension Father     Cataracts Father     No Known Problems Sister     No Known Problems Brother     No  Known Problems Maternal Aunt     No Known Problems Maternal Uncle     No Known Problems Paternal Aunt     No Known Problems Paternal Uncle     No Known Problems Maternal Grandmother     No Known Problems Maternal Grandfather     No Known Problems Paternal Grandmother     No Known Problems Paternal Grandfather     No Known Problems Other     Amblyopia Neg Hx     Blindness Neg Hx     Cancer Neg Hx     Glaucoma Neg Hx     Macular degeneration Neg Hx     Retinal detachment Neg Hx     Strabismus Neg Hx     Stroke Neg Hx     Thyroid disease Neg Hx     Melanoma Neg Hx      Social History     Socioeconomic History    Marital status:    Tobacco Use    Smoking status: Never    Smokeless tobacco: Never   Substance and Sexual Activity    Alcohol use: No    Drug use: No    Sexual activity: Yes     Partners: Male       Exam     There were no vitals filed for this visit.     Physical Exam:  General: She is not in acute distress. She is not ill-appearing.   HENT: Normocephalic and atraumatic. Moist mucous membranes.  Eyes: Conjunctivae normal.   Pulmonary: Pulmonary effort is normal.   Abdominal: Abdomen is soft and flat.   Skin: Skin is warm and dry. No rashes.   Psychiatric: Mood normal.        Neurologic Exam   Mental status: oriented to person, place, and time  Attention: Normal. Concentration: normal.  Speech: speech is normal.  Cranial Nerves: PERRL, EOMI intact, V1-V3 Facial sensation intact. Symmetric facies. Hearing grossly intact. Palate and uvula midline, symmetric. No tongue deviation. Trapezius strength intact.     Motor exam: bulk and tone normal. Strength 5/5 in bilateral upper extremities: deltoids, biceps, triceps, wrist flexion/extension, finger abduction/adduction. Strength 5/5 in bilateral lower extremities: hip flexion/extension, thigh adduction/abduction, knee flexion/extension, dorsiflexion/plantarflexion, foot eversion/inversion.    Reflexes: 2+ in bilateral upper extremities: biceps and  brachiaradialis, 2+ in bilateral lower extremities: patellar   Plantar reflex: normal  Yoder's: negative    Sensory exam: light touch intact    Gait exam: normal  Romberg: negative  Coordination: normal   Tremor: none    Labs and Imaging     Imaging: reviewed  4/2015 MRI lumbar spine w multilevel disc degeneration at L4-L5 and L5-S1    Assessment and Plan     Problem List Items Addressed This Visit          Neuro    DDD (degenerative disc disease), lumbar - Primary       Other    Paresthesia of left leg     67 year old female w hx of lumbar disc degeneration, states she gets numbness and tingling behind the L knee down to her L foot. Denies any weakness. Also has itching in her L leg. States itching, numbness and tingling started happening on her R foot now. NCS/EMG with a chronic L4-L5 denervation, no acute denervation. Will send physical therapy, gabapentin 300mg TID and followup in clinic.     Follow-up: 6 months

## 2023-03-14 ENCOUNTER — TELEPHONE (OUTPATIENT)
Dept: FAMILY MEDICINE | Facility: CLINIC | Age: 68
End: 2023-03-14
Payer: MEDICARE

## 2023-03-14 NOTE — TELEPHONE ENCOUNTER
----- Message from Shaun Justin Jr., MD sent at 3/12/2023  2:33 PM CDT -----  Please let patient know that lab results are okay, however should keep scheduled follow-up with me in four weeks as she needs vaccination against hepatitis a and B.

## 2023-03-25 ENCOUNTER — HOSPITAL ENCOUNTER (EMERGENCY)
Facility: HOSPITAL | Age: 68
Discharge: HOME | End: 2023-03-25
Attending: INTERNAL MEDICINE
Payer: MEDICARE

## 2023-03-25 VITALS
BODY MASS INDEX: 24.91 KG/M2 | OXYGEN SATURATION: 100 % | DIASTOLIC BLOOD PRESSURE: 84 MMHG | HEART RATE: 72 BPM | SYSTOLIC BLOOD PRESSURE: 146 MMHG | TEMPERATURE: 98 F | RESPIRATION RATE: 17 BRPM | HEIGHT: 66 IN | WEIGHT: 155 LBS

## 2023-03-25 DIAGNOSIS — M79.642 PAIN IN BOTH HANDS: Primary | ICD-10-CM

## 2023-03-25 DIAGNOSIS — M79.641 PAIN IN BOTH HANDS: Primary | ICD-10-CM

## 2023-03-25 PROCEDURE — 99281 EMR DPT VST MAYX REQ PHY/QHP: CPT | Mod: HCNC,ER

## 2023-03-25 RX ORDER — KETOROLAC TROMETHAMINE 30 MG/ML
30 INJECTION, SOLUTION INTRAMUSCULAR; INTRAVENOUS
Status: DISCONTINUED | OUTPATIENT
Start: 2023-03-25 | End: 2023-03-25 | Stop reason: HOSPADM

## 2023-03-25 RX ORDER — DEXAMETHASONE SODIUM PHOSPHATE 4 MG/ML
4 INJECTION, SOLUTION INTRA-ARTICULAR; INTRALESIONAL; INTRAMUSCULAR; INTRAVENOUS; SOFT TISSUE
Status: DISCONTINUED | OUTPATIENT
Start: 2023-03-25 | End: 2023-03-25 | Stop reason: HOSPADM

## 2023-03-25 NOTE — ED PROVIDER NOTES
Encounter Date: 3/25/2023       History     Chief Complaint   Patient presents with    Hand Pain     Reports bilateral hand pain and swelling x 2 days. Reports pain is worse tonight. Denies trauma or injury.     67-year-old female with a past medical history significant for lumbar disc disease with peripheral neuropathy presents to the emergency department complaining of bilateral hand pain swelling x2 days.  She states pain is worse tonight and denies trauma/fever/chills/nausea/vomiting/chest pain/shortness of breath.    The history is provided by the patient. No  was used.  And  Review of patient's allergies indicates:  No Known Allergies  Past Medical History:   Diagnosis Date    Acute cholecystitis 12/10/2015    Cholelithiasis 12/10/2015    DDD (degenerative disc disease), lumbar     Family history of diabetes mellitus type II 11/11/2015    GERD (gastroesophageal reflux disease)     Nuclear sclerosis of both eyes 6/13/2019    Unspecified asthma, with exacerbation     Urticaria     Vaginal delivery     x4     Past Surgical History:   Procedure Laterality Date    CHOLECYSTECTOMY      TOTAL ABDOMINAL HYSTERECTOMY  1981     Family History   Problem Relation Age of Onset    Hypertension Mother     Diabetes Mother     Cataracts Mother     Hypertension Father     Cataracts Father     No Known Problems Sister     No Known Problems Brother     No Known Problems Maternal Aunt     No Known Problems Maternal Uncle     No Known Problems Paternal Aunt     No Known Problems Paternal Uncle     No Known Problems Maternal Grandmother     No Known Problems Maternal Grandfather     No Known Problems Paternal Grandmother     No Known Problems Paternal Grandfather     No Known Problems Other     Amblyopia Neg Hx     Blindness Neg Hx     Cancer Neg Hx     Glaucoma Neg Hx     Macular degeneration Neg Hx     Retinal detachment Neg Hx     Strabismus Neg Hx     Stroke Neg Hx     Thyroid disease Neg Hx     Melanoma Neg  Hx      Social History     Tobacco Use    Smoking status: Never    Smokeless tobacco: Never   Substance Use Topics    Alcohol use: No    Drug use: No     Review of Systems   Constitutional:  Negative for chills and fever.   Respiratory:  Negative for shortness of breath.    Cardiovascular:  Negative for chest pain.   Neurological:  Positive for numbness. Negative for dizziness, tremors, seizures, syncope, facial asymmetry, speech difficulty, weakness, light-headedness and headaches.   All other systems reviewed and are negative.    Physical Exam     Initial Vitals [03/25/23 0134]   BP Pulse Resp Temp SpO2   (!) 146/84 72 17 97.8 °F (36.6 °C) 100 %      MAP       --         Physical Exam    Nursing note and vitals reviewed.  Constitutional: She is not diaphoretic. No distress.   HENT:   Head: Normocephalic and atraumatic.   Eyes: Conjunctivae are normal.   Neck:   Normal range of motion.  Cardiovascular:  Normal rate and regular rhythm.           Pulmonary/Chest: Breath sounds normal. No respiratory distress.   Abdominal: Abdomen is soft. Bowel sounds are normal.   Musculoskeletal:         General: No tenderness or edema.      Cervical back: Normal range of motion.      Comments: Bilateral hand pain upon making a fist.  No visible abnormalities/erythema/edema/induration/gross deformities.     Neurological: She is alert. She has normal strength.   Skin: Skin is warm and dry. Capillary refill takes less than 2 seconds.   Psychiatric: She has a normal mood and affect.       ED Course   Procedures  Labs Reviewed - No data to display       Imaging Results    None          Medications - No data to display    Medical Decision Making:   ED Management:  Toradol/Decadron were ordered in the ED, but patient eloped prior to receiving medications/instructions/prescriptions.                        Clinical Impression:   Final diagnoses:  [M79.641, M79.642] Pain in both hands (Primary)        ED Disposition Condition    Discharge  Stable          ED Prescriptions    None       Follow-up Information       Follow up With Specialties Details Why Contact Info    Shaun Justin Jr., MD Family Medicine Schedule an appointment as soon as possible for a visit in 2 days For reevaluation [address] [facility] [address]  [phone]               Santos Fam MD  03/25/23 0688

## 2023-04-03 ENCOUNTER — HOSPITAL ENCOUNTER (OUTPATIENT)
Dept: RADIOLOGY | Facility: CLINIC | Age: 68
Discharge: HOME | End: 2023-04-03
Attending: FAMILY MEDICINE
Payer: MEDICARE

## 2023-04-03 DIAGNOSIS — Z78.0 POSTMENOPAUSAL: Chronic | ICD-10-CM

## 2023-04-03 PROCEDURE — 77080 DXA BONE DENSITY AXIAL: CPT | Mod: TC,HCNC,PO

## 2023-04-03 PROCEDURE — 77080 DXA BONE DENSITY AXIAL: CPT | Mod: 26,HCNC,, | Performed by: INTERNAL MEDICINE

## 2023-04-03 PROCEDURE — 77080 DXA BONE DENSITY AXIAL SKELETON 1 OR MORE SITES: ICD-10-PCS | Mod: 26,HCNC,, | Performed by: INTERNAL MEDICINE

## 2023-04-10 ENCOUNTER — OFFICE VISIT (OUTPATIENT)
Dept: FAMILY MEDICINE | Facility: CLINIC | Age: 68
End: 2023-04-10
Payer: MEDICARE

## 2023-04-10 VITALS
TEMPERATURE: 98 F | OXYGEN SATURATION: 98 % | HEIGHT: 66 IN | BODY MASS INDEX: 27.46 KG/M2 | WEIGHT: 170.88 LBS | SYSTOLIC BLOOD PRESSURE: 102 MMHG | DIASTOLIC BLOOD PRESSURE: 62 MMHG | HEART RATE: 64 BPM

## 2023-04-10 DIAGNOSIS — M19.031 PRIMARY OSTEOARTHRITIS OF BOTH WRISTS: Chronic | ICD-10-CM

## 2023-04-10 DIAGNOSIS — I70.0 AORTIC ATHEROSCLEROSIS: Chronic | ICD-10-CM

## 2023-04-10 DIAGNOSIS — M17.0 PRIMARY OSTEOARTHRITIS OF BOTH KNEES: Chronic | ICD-10-CM

## 2023-04-10 DIAGNOSIS — F33.0 MILD EPISODE OF RECURRENT MAJOR DEPRESSIVE DISORDER: Primary | Chronic | ICD-10-CM

## 2023-04-10 DIAGNOSIS — J45.20 MILD INTERMITTENT ASTHMA WITHOUT COMPLICATION: Chronic | ICD-10-CM

## 2023-04-10 DIAGNOSIS — R20.2 PARESTHESIA OF LEFT LEG: Chronic | ICD-10-CM

## 2023-04-10 DIAGNOSIS — K76.0 FATTY LIVER: Chronic | ICD-10-CM

## 2023-04-10 DIAGNOSIS — M19.032 PRIMARY OSTEOARTHRITIS OF BOTH WRISTS: Chronic | ICD-10-CM

## 2023-04-10 PROCEDURE — 1101F PR PT FALLS ASSESS DOC 0-1 FALLS W/OUT INJ PAST YR: ICD-10-PCS | Mod: HCNC,CPTII,S$GLB, | Performed by: FAMILY MEDICINE

## 2023-04-10 PROCEDURE — 99214 OFFICE O/P EST MOD 30 MIN: CPT | Mod: HCNC,S$GLB,, | Performed by: FAMILY MEDICINE

## 2023-04-10 PROCEDURE — 3074F SYST BP LT 130 MM HG: CPT | Mod: HCNC,CPTII,S$GLB, | Performed by: FAMILY MEDICINE

## 2023-04-10 PROCEDURE — 3078F DIAST BP <80 MM HG: CPT | Mod: HCNC,CPTII,S$GLB, | Performed by: FAMILY MEDICINE

## 2023-04-10 PROCEDURE — 1160F RVW MEDS BY RX/DR IN RCRD: CPT | Mod: HCNC,CPTII,S$GLB, | Performed by: FAMILY MEDICINE

## 2023-04-10 PROCEDURE — 1160F PR REVIEW ALL MEDS BY PRESCRIBER/CLIN PHARMACIST DOCUMENTED: ICD-10-PCS | Mod: HCNC,CPTII,S$GLB, | Performed by: FAMILY MEDICINE

## 2023-04-10 PROCEDURE — 99214 PR OFFICE/OUTPT VISIT, EST, LEVL IV, 30-39 MIN: ICD-10-PCS | Mod: HCNC,S$GLB,, | Performed by: FAMILY MEDICINE

## 2023-04-10 PROCEDURE — 99999 PR PBB SHADOW E&M-EST. PATIENT-LVL IV: ICD-10-PCS | Mod: PBBFAC,HCNC,, | Performed by: FAMILY MEDICINE

## 2023-04-10 PROCEDURE — 3078F PR MOST RECENT DIASTOLIC BLOOD PRESSURE < 80 MM HG: ICD-10-PCS | Mod: HCNC,CPTII,S$GLB, | Performed by: FAMILY MEDICINE

## 2023-04-10 PROCEDURE — 3008F PR BODY MASS INDEX (BMI) DOCUMENTED: ICD-10-PCS | Mod: HCNC,CPTII,S$GLB, | Performed by: FAMILY MEDICINE

## 2023-04-10 PROCEDURE — 1159F PR MEDICATION LIST DOCUMENTED IN MEDICAL RECORD: ICD-10-PCS | Mod: HCNC,CPTII,S$GLB, | Performed by: FAMILY MEDICINE

## 2023-04-10 PROCEDURE — 1125F AMNT PAIN NOTED PAIN PRSNT: CPT | Mod: HCNC,CPTII,S$GLB, | Performed by: FAMILY MEDICINE

## 2023-04-10 PROCEDURE — 3008F BODY MASS INDEX DOCD: CPT | Mod: HCNC,CPTII,S$GLB, | Performed by: FAMILY MEDICINE

## 2023-04-10 PROCEDURE — 99999 PR PBB SHADOW E&M-EST. PATIENT-LVL IV: CPT | Mod: PBBFAC,HCNC,, | Performed by: FAMILY MEDICINE

## 2023-04-10 PROCEDURE — 3288F FALL RISK ASSESSMENT DOCD: CPT | Mod: HCNC,CPTII,S$GLB, | Performed by: FAMILY MEDICINE

## 2023-04-10 PROCEDURE — 3288F PR FALLS RISK ASSESSMENT DOCUMENTED: ICD-10-PCS | Mod: HCNC,CPTII,S$GLB, | Performed by: FAMILY MEDICINE

## 2023-04-10 PROCEDURE — 1159F MED LIST DOCD IN RCRD: CPT | Mod: HCNC,CPTII,S$GLB, | Performed by: FAMILY MEDICINE

## 2023-04-10 PROCEDURE — 1101F PT FALLS ASSESS-DOCD LE1/YR: CPT | Mod: HCNC,CPTII,S$GLB, | Performed by: FAMILY MEDICINE

## 2023-04-10 PROCEDURE — 3074F PR MOST RECENT SYSTOLIC BLOOD PRESSURE < 130 MM HG: ICD-10-PCS | Mod: HCNC,CPTII,S$GLB, | Performed by: FAMILY MEDICINE

## 2023-04-10 PROCEDURE — 1125F PR PAIN SEVERITY QUANTIFIED, PAIN PRESENT: ICD-10-PCS | Mod: HCNC,CPTII,S$GLB, | Performed by: FAMILY MEDICINE

## 2023-04-10 RX ORDER — DULOXETIN HYDROCHLORIDE 30 MG/1
30 CAPSULE, DELAYED RELEASE ORAL DAILY
Qty: 90 CAPSULE | Refills: 2 | Status: SHIPPED | OUTPATIENT
Start: 2023-04-10 | End: 2023-10-16 | Stop reason: SDUPTHER

## 2023-04-10 RX ORDER — ATORVASTATIN CALCIUM 10 MG/1
10 TABLET, FILM COATED ORAL DAILY
Qty: 90 TABLET | Refills: 3 | Status: SHIPPED | OUTPATIENT
Start: 2023-04-10 | End: 2023-10-16 | Stop reason: SDUPTHER

## 2023-04-10 RX ORDER — ALBUTEROL SULFATE 90 UG/1
2 AEROSOL, METERED RESPIRATORY (INHALATION) EVERY 6 HOURS PRN
Qty: 18 G | Refills: 5 | Status: SHIPPED | OUTPATIENT
Start: 2023-04-10 | End: 2024-04-09

## 2023-04-10 NOTE — PATIENT INSTRUCTIONS
The bone density showed osteopenia. This is weakening of the bones, but not osteoporosis weak. I recommend taking a calcium and vitamin D supplement such as Caltrate twice a day. It has to be taken twice a day and not both pills together, at least 8 hours apart. This will help with bone building. We can recheck the bone density in a few years.

## 2023-04-11 ENCOUNTER — HOSPITAL ENCOUNTER (OUTPATIENT)
Dept: RADIOLOGY | Facility: HOSPITAL | Age: 68
Discharge: HOME | End: 2023-04-11
Attending: FAMILY MEDICINE
Payer: MEDICARE

## 2023-04-11 DIAGNOSIS — K76.0 FATTY LIVER: ICD-10-CM

## 2023-04-11 DIAGNOSIS — Z12.31 VISIT FOR SCREENING MAMMOGRAM: ICD-10-CM

## 2023-04-11 PROBLEM — R07.9 CHEST PAIN: Status: RESOLVED | Noted: 2023-02-27 | Resolved: 2023-04-11

## 2023-04-11 PROBLEM — J45.30 ASTHMA, CHRONIC, MILD PERSISTENT, UNCOMPLICATED: Status: ACTIVE | Noted: 2023-04-11

## 2023-04-11 PROBLEM — J45.30 ASTHMA, CHRONIC, MILD PERSISTENT, UNCOMPLICATED: Status: RESOLVED | Noted: 2023-04-11 | Resolved: 2023-04-11

## 2023-04-11 PROBLEM — I70.0 AORTIC ATHEROSCLEROSIS: Chronic | Status: ACTIVE | Noted: 2023-02-27

## 2023-04-11 PROBLEM — R20.2 PARESTHESIA OF LEFT LEG: Chronic | Status: ACTIVE | Noted: 2023-03-07

## 2023-04-11 PROCEDURE — 77067 SCR MAMMO BI INCL CAD: CPT | Mod: TC,HCNC

## 2023-04-11 PROCEDURE — 76705 US ABDOMEN LIMITED: ICD-10-PCS | Mod: 26,HCNC,, | Performed by: RADIOLOGY

## 2023-04-11 PROCEDURE — 77063 MAMMO DIGITAL SCREENING BILAT WITH TOMO: ICD-10-PCS | Mod: 26,HCNC,, | Performed by: RADIOLOGY

## 2023-04-11 PROCEDURE — 77063 BREAST TOMOSYNTHESIS BI: CPT | Mod: 26,HCNC,, | Performed by: RADIOLOGY

## 2023-04-11 PROCEDURE — 77067 MAMMO DIGITAL SCREENING BILAT WITH TOMO: ICD-10-PCS | Mod: 26,HCNC,, | Performed by: RADIOLOGY

## 2023-04-11 PROCEDURE — 76705 ECHO EXAM OF ABDOMEN: CPT | Mod: TC,HCNC

## 2023-04-11 PROCEDURE — 76705 ECHO EXAM OF ABDOMEN: CPT | Mod: 26,HCNC,, | Performed by: RADIOLOGY

## 2023-04-11 PROCEDURE — 77067 SCR MAMMO BI INCL CAD: CPT | Mod: 26,HCNC,, | Performed by: RADIOLOGY

## 2023-04-11 NOTE — ASSESSMENT & PLAN NOTE
Serology shows no acute concerns.    Patient's ultrasound of the liver was rescheduled.  Will follow-up with patient in the next 2 to 3 weeks.

## 2023-04-11 NOTE — ASSESSMENT & PLAN NOTE
Reports depression is improving with Cymbalta.  Will continue.  Re-evaluate in the next few weeks when she comes in for results follow-up.

## 2023-04-11 NOTE — ASSESSMENT & PLAN NOTE
Patient saw Neurology on 2/24/23 and was scheduled for EMG.  She was started on gabapentin.  She continues to have symptoms.  She is due for re-evaluation, and I strongly advised her to make an appointment.

## 2023-04-11 NOTE — ASSESSMENT & PLAN NOTE
Patient reports compliance with atorvastatin.    She was scheduled for fasting lipids and hepatic function tomorrow as she had missed

## 2023-04-11 NOTE — PROGRESS NOTES
"  Patient Name: Bossman Kumari    : 1955  MRN: [id number]      Subjective:     Patient ID: Bossman is a 67 y.o. female    Chief Complaint:  Medication Refill    67-year-old female with chronic conditions as per problem list comes in to review her medications.  She had been advised to bring in all her medications however she did not.  She states that she forgot.  He reports good compliance with her medications however.    Patient also did not have her labs done last week which had been scheduled.  She also missed her mammogram and ultrasound of the liver, and states that she has been having problems with her son's health, which caused her to miss her appointment.    She does report that with the Cymbalta, her depression, and arthritic pains are improving.  She reports no acute asthma symptoms.  She is requesting a refill on her asthma inhaler.       Review of Systems   Constitutional:  Negative for unexpected weight change.   HENT:  Negative for ear pain and sore throat.    Respiratory:  Negative for shortness of breath.    Cardiovascular:  Negative for chest pain.   Gastrointestinal:  Negative for abdominal pain and blood in stool.   Genitourinary:  Negative for dysuria and frequency.   Musculoskeletal:  Positive for arthralgias, back pain, leg pain and myalgias.   Integumentary:  Negative for rash.   Neurological:  Positive for numbness (left leg). Negative for weakness.   Hematological:  Negative for adenopathy.   Psychiatric/Behavioral:  Positive for decreased concentration (improving) and dysphoric mood (improving). Negative for hallucinations, self-injury and suicidal ideas. The patient is not nervous/anxious.       Objective:   /62 (BP Location: Right arm, Patient Position: Sitting, BP Method: Large (Manual))   Pulse 64   Temp 98 °F (36.7 °C) (Oral)   Ht 5' 6" (1.676 m)   Wt 77.5 kg (170 lb 13.7 oz)   SpO2 98%   BMI 27.58 kg/m²     Physical Exam  Vitals reviewed.   Constitutional:       " General: She is not in acute distress.  HENT:      Head: Normocephalic and atraumatic.      Right Ear: Ear canal and external ear normal.      Left Ear: Ear canal and external ear normal.      Nose: Nose normal.   Eyes:      Extraocular Movements: Extraocular movements intact.      Conjunctiva/sclera: Conjunctivae normal.      Pupils: Pupils are equal, round, and reactive to light.   Cardiovascular:      Rate and Rhythm: Normal rate and regular rhythm.      Pulses: Normal pulses.      Heart sounds: Normal heart sounds.   Pulmonary:      Effort: Pulmonary effort is normal. No respiratory distress.      Breath sounds: No wheezing or rales.   Abdominal:      General: Abdomen is flat. Bowel sounds are normal. There is no distension.      Palpations: Abdomen is soft.      Tenderness: There is no abdominal tenderness. There is no guarding.   Musculoskeletal:      Cervical back: Normal range of motion. No rigidity or tenderness.   Lymphadenopathy:      Cervical: No cervical adenopathy.   Skin:     General: Skin is warm.      Capillary Refill: Capillary refill takes less than 2 seconds.   Neurological:      Mental Status: She is alert and oriented to person, place, and time.      Cranial Nerves: No cranial nerve deficit.      Sensory: No sensory deficit.   Psychiatric:         Mood and Affect: Mood normal.         Behavior: Behavior normal.      Assessment        ICD-10-CM ICD-9-CM   1. Mild episode of recurrent major depressive disorder  F33.0 296.31   2. Primary osteoarthritis of both wrists  M19.031 715.13    M19.032    3. Primary osteoarthritis of both knees  M17.0 715.16   4. Mild intermittent asthma without complication  J45.20 493.90   5. Aortic atherosclerosis  I70.0 440.0   6. Fatty liver  K76.0 571.8   7. Paresthesia of left leg  R20.2 782.0         Plan:     1. Mild episode of recurrent major depressive disorder  Assessment & Plan:  Reports depression is improving with Cymbalta.  Will continue.  Re-evaluate in  the next few weeks when she comes in for results follow-up.    Orders:  -     DULoxetine (CYMBALTA) 30 MG capsule; Take 1 capsule (30 mg total) by mouth once daily.  Dispense: 90 capsule; Refill: 2  -     Ambulatory referral/consult to Outpatient Case Management    2. Primary osteoarthritis of both wrists  Assessment & Plan:  Will continue Cymbalta as she states that her pain is improved.    Orders:  -     DULoxetine (CYMBALTA) 30 MG capsule; Take 1 capsule (30 mg total) by mouth once daily.  Dispense: 90 capsule; Refill: 2    3. Primary osteoarthritis of both knees  Assessment & Plan:  Continue Cymbalta.    Orders:  -     DULoxetine (CYMBALTA) 30 MG capsule; Take 1 capsule (30 mg total) by mouth once daily.  Dispense: 90 capsule; Refill: 2    4. Mild intermittent asthma without complication  Assessment & Plan:  Pro air was refilled.    Orders:  -     albuterol (PROAIR HFA) 90 mcg/actuation inhaler; Inhale 2 puffs into the lungs every 6 (six) hours as needed for Wheezing.  Dispense: 18 g; Refill: 5    5. Aortic atherosclerosis  Overview:  12-  Chest two views: Heart size is normal.  There is aortic plaque    Assessment & Plan:  Patient reports compliance with atorvastatin.    She was scheduled for fasting lipids and hepatic function tomorrow as she had missed    Orders:  -     Comprehensive Metabolic Panel; Future; Expected date: 04/10/2023  -     Lipid Panel; Future; Expected date: 04/10/2023  -     atorvastatin (LIPITOR) 10 MG tablet; Take 1 tablet (10 mg total) by mouth once daily.  Dispense: 90 tablet; Refill: 3    6. Fatty liver  Assessment & Plan:  Serology shows no acute concerns.    Patient's ultrasound of the liver was rescheduled.  Will follow-up with patient in the next 2 to 3 weeks.      7. Paresthesia of left leg  Assessment & Plan:  Patient saw Neurology on 2/24/23 and was scheduled for EMG.  She was started on gabapentin.  She continues to have symptoms.  She is due for re-evaluation, and I  strongly advised her to make an appointment.    Orders:  -     Ambulatory referral/consult to Outpatient Case Management           -Shaun Justin Jr., MD, AAHIVS      This office note has been dictated.  This dictation has been generated using M-Modal Fluency Direct dictation; some phonetic errors may occur.         Patient Instructions   The bone density showed osteopenia. This is weakening of the bones, but not osteoporosis weak. I recommend taking a calcium and vitamin D supplement such as Caltrate twice a day. It has to be taken twice a day and not both pills together, at least 8 hours apart. This will help with bone building. We can recheck the bone density in a few years.         Future Appointments   Date Time Provider Department Center   4/11/2023  7:40 AM [facility] MAMMO2 [facility] MAMMO [facility]   4/11/2023  8:05 AM LAB, [facility] LAB [facility]   4/11/2023  8:45 AM [facility]   4/20/2023  3:00 PM Bull Nicholson, PT [facility] OP [facility]   7/13/2023  9:15 AM WENDY SCHWRATZ [facility] OPHTHAL [facility]   7/13/2023 10:00 AM Seema Casas MD [facility] OPHTHAL [facility]   10/9/2023  9:30 AM LAB, [facility] DRAW STATION [facility]   10/16/2023  9:40 AM Shaun Justin Jr., MD [facility]

## 2023-04-13 ENCOUNTER — TELEPHONE (OUTPATIENT)
Dept: FAMILY MEDICINE | Facility: CLINIC | Age: 68
End: 2023-04-13
Payer: MEDICARE

## 2023-04-13 NOTE — TELEPHONE ENCOUNTER
Patient has been notified of results and of the provider's recommendation to use a calcium and vitamin D supplement. Patient verbalized understanding.

## 2023-04-13 NOTE — TELEPHONE ENCOUNTER
----- Message from Shaun Justin Jr., MD sent at 4/7/2023 10:52 AM CDT -----  The bone density showed osteopenia. This is weakening of the bones, but not osteoporosis weak. I recommend taking a calcium and vitamin D supplement such as Caltrate twice a day. It has to be taken twice a day at least 8 hours apart. This will help with bone building. We can recheck the bone density in a few years.

## 2023-04-17 ENCOUNTER — TELEPHONE (OUTPATIENT)
Dept: OPTOMETRY | Facility: CLINIC | Age: 68
End: 2023-04-17
Payer: MEDICARE

## 2023-04-17 NOTE — TELEPHONE ENCOUNTER
----- Message from Darin Steven sent at 4/17/2023  2:47 PM CDT -----  Contact: [phone]  Pt is calling because she has been having extremely blurry vision and thinks it may be due to her cataracts. She was trying to see if she could be seen soon. Please call back to further assist.

## 2023-04-25 ENCOUNTER — OUTPATIENT CASE MANAGEMENT (OUTPATIENT)
Dept: ADMINISTRATIVE | Facility: OTHER | Age: 68
End: 2023-04-25
Payer: MEDICARE

## 2023-04-25 ENCOUNTER — PES CALL (OUTPATIENT)
Dept: ADMINISTRATIVE | Facility: CLINIC | Age: 68
End: 2023-04-25
Payer: MEDICARE

## 2023-04-25 NOTE — PROGRESS NOTES
1st Attempt to complete Social Work Assessment for Outpatient Care Management; left message and mailed a letter with contact information requesting a return call.  LCSW will reattempt at a later date.

## 2023-05-02 NOTE — PROGRESS NOTES
Outpatient Care Management   - High Risk Patient Assessment    Patient: Bossman Kumari  MRN:  [id number]  Date of Service:  5/2/2023  Completed by:  Tamika Holt LCSW  Referral Date: 04/11/2023    Reason for Visit   Patient presents with    Other     4/25/2023  1st attempt to complete Initial Assessment  for Outpatient Care Management, left message.  Will mail letter with OPCM SW contact info      Social Work Assessment - Low/Mod Risk       Brief Summary:  received a referral from pt's PCP for the following patient identified psycho-social needs of pt needing mental health and transportation resouces. Care plan was created in collaboration with patient/caregiver input.  completed the SDOH questionnaire.

## 2023-05-09 ENCOUNTER — OUTPATIENT CASE MANAGEMENT (OUTPATIENT)
Dept: ADMINISTRATIVE | Facility: OTHER | Age: 68
End: 2023-05-09
Payer: MEDICARE

## 2023-05-09 ENCOUNTER — TELEPHONE (OUTPATIENT)
Dept: FAMILY MEDICINE | Facility: CLINIC | Age: 68
End: 2023-05-09
Payer: MEDICARE

## 2023-05-23 ENCOUNTER — OUTPATIENT CASE MANAGEMENT (OUTPATIENT)
Dept: ADMINISTRATIVE | Facility: OTHER | Age: 68
End: 2023-05-23
Payer: MEDICARE

## 2023-05-23 NOTE — PROGRESS NOTES
"OPCM SW completed follow up call with pt who declined need for mental health services.  Pt states she reads her bible or listens to gospel music to help her mood.  Anti depressant meds seem to be helping per pt report.  Pt has a car but does report sometimes "money is tight" and does not have money to put gas in her car.  Pt goes to the food bank in [place] for commodities and the cancer center helps with providing pt's  [employer] and [employer] gift cards to buy food.  Pt denies any other social work needs. Will close case on this date.     "

## 2023-07-13 ENCOUNTER — TELEPHONE (OUTPATIENT)
Dept: OPHTHALMOLOGY | Facility: CLINIC | Age: 68
End: 2023-07-13
Payer: MEDICAID

## 2023-07-13 NOTE — TELEPHONE ENCOUNTER
----- Message from Sherrell Hugo sent at 7/13/2023 10:40 AM CDT -----  Regarding: Appt R/S  Pt called about r/s today appt due to going to wrong location. Pt asked if she can still be seen today?     Pts call back- [phone]

## 2023-07-26 ENCOUNTER — TELEPHONE (OUTPATIENT)
Dept: OPTOMETRY | Facility: CLINIC | Age: 68
End: 2023-07-26
Payer: MEDICARE

## 2023-07-26 NOTE — TELEPHONE ENCOUNTER
----- Message from Beatrice Biswas sent at 7/26/2023  9:42 AM CDT -----  Contact: pt @ [phone]  Bossman Kumari calling regarding Appointment Access  (message) for #pt is calling to get appt for left eye hurting, asking for call back

## 2023-08-01 ENCOUNTER — TELEPHONE (OUTPATIENT)
Dept: OPTOMETRY | Facility: CLINIC | Age: 68
End: 2023-08-01
Payer: MEDICARE

## 2023-08-01 NOTE — TELEPHONE ENCOUNTER
----- Message from Analisa Gallagher sent at 7/27/2023  4:54 PM CDT -----  Regarding: FW: pt called    ----- Message -----  From: Teena Deluca  Sent: 7/27/2023   1:58 PM CDT  To: Uzair Thao Staff  Subject: pt called                                        Type: Patient Call Back         Who called:VANDANA MATTHEWS [[id number]]         What is the request in detail: Patient is requesting an appointment. Contact: pt @ [phone]  Vandana Matthews calling regarding Appointment Access  (message) for #pt is calling to get appt for left eye hurting, asking for call back         Can the clinic reply by [facility]? No         Would the patient rather a call back or a response via [facility]? Call back            Thank you.

## 2023-08-10 ENCOUNTER — TELEPHONE (OUTPATIENT)
Dept: ENDOSCOPY | Facility: HOSPITAL | Age: 68
End: 2023-08-10
Payer: MEDICARE

## 2023-08-10 DIAGNOSIS — Z12.11 COLON CANCER SCREENING: Primary | ICD-10-CM

## 2023-08-19 ENCOUNTER — TELEPHONE (OUTPATIENT)
Dept: FAMILY MEDICINE | Facility: CLINIC | Age: 68
End: 2023-08-19
Payer: MEDICARE

## 2023-08-19 NOTE — TELEPHONE ENCOUNTER
If patient is having a concern which needs evaluation she needs to make an appt. If I do not have availability (no overriding schedule) can see NP

## 2023-08-19 NOTE — TELEPHONE ENCOUNTER
----- Message from Jenny Weston MA sent at 8/18/2023  1:33 PM CDT -----  Regarding: FW: concerns    ----- Message -----  From: Ale Burton  Sent: 8/18/2023  11:21 AM CDT  To: Nhan Dunn Staff  Subject: concerns                                         Name of who is calling:   Bossman       What is the request in detail: Pt is requesting a call back in regards to having a stress test due to headaches      Can the clinic reply by [facility]:no      What number to call back if not [facility]:[phone]

## 2023-10-06 ENCOUNTER — OFFICE VISIT (OUTPATIENT)
Dept: OPTOMETRY | Facility: CLINIC | Age: 68
End: 2023-10-06
Payer: MEDICARE

## 2023-10-06 DIAGNOSIS — H10.13 ALLERGIC CONJUNCTIVITIS, BILATERAL: ICD-10-CM

## 2023-10-06 DIAGNOSIS — H52.7 REFRACTIVE ERROR: ICD-10-CM

## 2023-10-06 DIAGNOSIS — H02.402 PTOSIS, LEFT EYELID: ICD-10-CM

## 2023-10-06 DIAGNOSIS — H04.123 DRY EYE SYNDROME, BILATERAL: ICD-10-CM

## 2023-10-06 DIAGNOSIS — H25.13 NUCLEAR SCLEROSIS OF BOTH EYES: Primary | ICD-10-CM

## 2023-10-06 PROCEDURE — 92014 PR EYE EXAM, EST PATIENT,COMPREHESV: ICD-10-PCS | Mod: S$GLB,,, | Performed by: OPTOMETRIST

## 2023-10-06 PROCEDURE — 99999 PR PBB SHADOW E&M-EST. PATIENT-LVL II: CPT | Mod: PBBFAC,,, | Performed by: OPTOMETRIST

## 2023-10-06 PROCEDURE — 1126F PR PAIN SEVERITY QUANTIFIED, NO PAIN PRESENT: ICD-10-PCS | Mod: CPTII,S$GLB,, | Performed by: OPTOMETRIST

## 2023-10-06 PROCEDURE — 1159F PR MEDICATION LIST DOCUMENTED IN MEDICAL RECORD: ICD-10-PCS | Mod: CPTII,S$GLB,, | Performed by: OPTOMETRIST

## 2023-10-06 PROCEDURE — 99999 PR PBB SHADOW E&M-EST. PATIENT-LVL II: ICD-10-PCS | Mod: PBBFAC,,, | Performed by: OPTOMETRIST

## 2023-10-06 PROCEDURE — 1126F AMNT PAIN NOTED NONE PRSNT: CPT | Mod: CPTII,S$GLB,, | Performed by: OPTOMETRIST

## 2023-10-06 PROCEDURE — 1160F RVW MEDS BY RX/DR IN RCRD: CPT | Mod: CPTII,S$GLB,, | Performed by: OPTOMETRIST

## 2023-10-06 PROCEDURE — 92015 PR REFRACTION: ICD-10-PCS | Mod: S$GLB,,, | Performed by: OPTOMETRIST

## 2023-10-06 PROCEDURE — 1159F MED LIST DOCD IN RCRD: CPT | Mod: CPTII,S$GLB,, | Performed by: OPTOMETRIST

## 2023-10-06 PROCEDURE — 1160F PR REVIEW ALL MEDS BY PRESCRIBER/CLIN PHARMACIST DOCUMENTED: ICD-10-PCS | Mod: CPTII,S$GLB,, | Performed by: OPTOMETRIST

## 2023-10-06 PROCEDURE — 92014 COMPRE OPH EXAM EST PT 1/>: CPT | Mod: S$GLB,,, | Performed by: OPTOMETRIST

## 2023-10-06 PROCEDURE — 92015 DETERMINE REFRACTIVE STATE: CPT | Mod: S$GLB,,, | Performed by: OPTOMETRIST

## 2023-10-06 NOTE — PROGRESS NOTES
Subjective:       Patient ID: Bossman Kumari is a 67 y.o. female      Chief Complaint   Patient presents with    Concerns About Ocular Health     History of Present Illness  Dls: 9/1/22 Dr. Dunne    66 y/o female presents today with c/o blurry vision at distance and near os >od   Pt wears bifocal glasses.     No tearing  + ou itching  No burning  No pain  No ha's  + ou  floaters  No flashes    Eye meds  Otc gtts ou prn     Pohx:   None    Fohx:   Cat - mother          Assessment/Plan:     1. Nuclear sclerosis of both eyes  Educated pt on presence of cataracts and effects on vision. No surgery at this time. Recheck in one year, sooner PRN.    2. Dry eye syndrome, bilateral  Recommend Refresh/Systane BID - QID OU PRN.     3. Allergic conjunctivitis, bilateral  Pataday OU PRN    4. Ptosis, left eyelid  Pt is scheduled next month for ptosis consult with Dr. Casas.    5. Refractive error  Educated patient on refractive error and discussed lens options. Dispensed updated spectacle Rx. Educated about adaptation period to new specs.    Eyeglass Final Rx       Eyeglass Final Rx         Sphere Cylinder Axis    Right +1.00 +0.25 180    Left +0.25 +0.75 110      Type: SVL distance    Expiration Date: 10/6/2024              Eyeglass Final Rx #2         Sphere Cylinder Axis    Right +3.50 +0.25 180    Left +2.75 +0.75 110      Type: SVL near    Expiration Date: 10/6/2024                      Follow up in about 27 days (around 11/2/2023) for oculoplastics consult.

## 2023-10-16 ENCOUNTER — OFFICE VISIT (OUTPATIENT)
Dept: FAMILY MEDICINE | Facility: CLINIC | Age: 68
End: 2023-10-16
Payer: MEDICARE

## 2023-10-16 VITALS
BODY MASS INDEX: 26.12 KG/M2 | WEIGHT: 162.5 LBS | HEIGHT: 66 IN | DIASTOLIC BLOOD PRESSURE: 63 MMHG | TEMPERATURE: 98 F | HEART RATE: 67 BPM | OXYGEN SATURATION: 98 % | SYSTOLIC BLOOD PRESSURE: 102 MMHG

## 2023-10-16 DIAGNOSIS — R09.81 SINUS CONGESTION: ICD-10-CM

## 2023-10-16 DIAGNOSIS — M19.031 PRIMARY OSTEOARTHRITIS OF BOTH WRISTS: Chronic | ICD-10-CM

## 2023-10-16 DIAGNOSIS — M17.0 PRIMARY OSTEOARTHRITIS OF BOTH KNEES: Chronic | ICD-10-CM

## 2023-10-16 DIAGNOSIS — Z12.11 ENCOUNTER FOR SCREENING FOR COLORECTAL CANCER IN HIGH RISK PATIENT: ICD-10-CM

## 2023-10-16 DIAGNOSIS — Z91.89 ENCOUNTER FOR SCREENING FOR COLORECTAL CANCER IN HIGH RISK PATIENT: ICD-10-CM

## 2023-10-16 DIAGNOSIS — Z80.0 FAMILY HISTORY OF COLORECTAL CANCER: ICD-10-CM

## 2023-10-16 DIAGNOSIS — F33.0 MILD EPISODE OF RECURRENT MAJOR DEPRESSIVE DISORDER: Chronic | ICD-10-CM

## 2023-10-16 DIAGNOSIS — I70.0 AORTIC ATHEROSCLEROSIS: Primary | Chronic | ICD-10-CM

## 2023-10-16 DIAGNOSIS — F51.04 PSYCHOPHYSIOLOGICAL INSOMNIA: ICD-10-CM

## 2023-10-16 DIAGNOSIS — M19.032 PRIMARY OSTEOARTHRITIS OF BOTH WRISTS: Chronic | ICD-10-CM

## 2023-10-16 DIAGNOSIS — M51.36 DDD (DEGENERATIVE DISC DISEASE), LUMBAR: ICD-10-CM

## 2023-10-16 DIAGNOSIS — Z12.12 ENCOUNTER FOR SCREENING FOR COLORECTAL CANCER IN HIGH RISK PATIENT: ICD-10-CM

## 2023-10-16 DIAGNOSIS — Z28.21 INFLUENZA VACCINATION DECLINED: ICD-10-CM

## 2023-10-16 PROCEDURE — 1101F PT FALLS ASSESS-DOCD LE1/YR: CPT | Mod: CPTII,S$GLB,, | Performed by: FAMILY MEDICINE

## 2023-10-16 PROCEDURE — 3078F DIAST BP <80 MM HG: CPT | Mod: CPTII,S$GLB,, | Performed by: FAMILY MEDICINE

## 2023-10-16 PROCEDURE — 3074F SYST BP LT 130 MM HG: CPT | Mod: CPTII,S$GLB,, | Performed by: FAMILY MEDICINE

## 2023-10-16 PROCEDURE — 1160F RVW MEDS BY RX/DR IN RCRD: CPT | Mod: CPTII,S$GLB,, | Performed by: FAMILY MEDICINE

## 2023-10-16 PROCEDURE — 99214 OFFICE O/P EST MOD 30 MIN: CPT | Mod: S$GLB,,, | Performed by: FAMILY MEDICINE

## 2023-10-16 PROCEDURE — 1159F PR MEDICATION LIST DOCUMENTED IN MEDICAL RECORD: ICD-10-PCS | Mod: CPTII,S$GLB,, | Performed by: FAMILY MEDICINE

## 2023-10-16 PROCEDURE — 1159F MED LIST DOCD IN RCRD: CPT | Mod: CPTII,S$GLB,, | Performed by: FAMILY MEDICINE

## 2023-10-16 PROCEDURE — 1160F PR REVIEW ALL MEDS BY PRESCRIBER/CLIN PHARMACIST DOCUMENTED: ICD-10-PCS | Mod: CPTII,S$GLB,, | Performed by: FAMILY MEDICINE

## 2023-10-16 PROCEDURE — 99214 PR OFFICE/OUTPT VISIT, EST, LEVL IV, 30-39 MIN: ICD-10-PCS | Mod: S$GLB,,, | Performed by: FAMILY MEDICINE

## 2023-10-16 PROCEDURE — 3008F BODY MASS INDEX DOCD: CPT | Mod: CPTII,S$GLB,, | Performed by: FAMILY MEDICINE

## 2023-10-16 PROCEDURE — 3074F PR MOST RECENT SYSTOLIC BLOOD PRESSURE < 130 MM HG: ICD-10-PCS | Mod: CPTII,S$GLB,, | Performed by: FAMILY MEDICINE

## 2023-10-16 PROCEDURE — 1101F PR PT FALLS ASSESS DOC 0-1 FALLS W/OUT INJ PAST YR: ICD-10-PCS | Mod: CPTII,S$GLB,, | Performed by: FAMILY MEDICINE

## 2023-10-16 PROCEDURE — 99999 PR PBB SHADOW E&M-EST. PATIENT-LVL IV: CPT | Mod: PBBFAC,,, | Performed by: FAMILY MEDICINE

## 2023-10-16 PROCEDURE — 99999 PR PBB SHADOW E&M-EST. PATIENT-LVL IV: ICD-10-PCS | Mod: PBBFAC,,, | Performed by: FAMILY MEDICINE

## 2023-10-16 PROCEDURE — 3008F PR BODY MASS INDEX (BMI) DOCUMENTED: ICD-10-PCS | Mod: CPTII,S$GLB,, | Performed by: FAMILY MEDICINE

## 2023-10-16 PROCEDURE — 1126F PR PAIN SEVERITY QUANTIFIED, NO PAIN PRESENT: ICD-10-PCS | Mod: CPTII,S$GLB,, | Performed by: FAMILY MEDICINE

## 2023-10-16 PROCEDURE — 3288F PR FALLS RISK ASSESSMENT DOCUMENTED: ICD-10-PCS | Mod: CPTII,S$GLB,, | Performed by: FAMILY MEDICINE

## 2023-10-16 PROCEDURE — 1126F AMNT PAIN NOTED NONE PRSNT: CPT | Mod: CPTII,S$GLB,, | Performed by: FAMILY MEDICINE

## 2023-10-16 PROCEDURE — 3288F FALL RISK ASSESSMENT DOCD: CPT | Mod: CPTII,S$GLB,, | Performed by: FAMILY MEDICINE

## 2023-10-16 PROCEDURE — 3078F PR MOST RECENT DIASTOLIC BLOOD PRESSURE < 80 MM HG: ICD-10-PCS | Mod: CPTII,S$GLB,, | Performed by: FAMILY MEDICINE

## 2023-10-16 RX ORDER — ATORVASTATIN CALCIUM 10 MG/1
10 TABLET, FILM COATED ORAL DAILY
Qty: 90 TABLET | Refills: 3 | Status: SHIPPED | OUTPATIENT
Start: 2023-10-16 | End: 2023-12-22 | Stop reason: SDUPTHER

## 2023-10-16 RX ORDER — MIRTAZAPINE 7.5 MG/1
7.5 TABLET, FILM COATED ORAL DAILY
Qty: 30 TABLET | Refills: 5 | Status: SHIPPED | OUTPATIENT
Start: 2023-10-16 | End: 2023-12-22 | Stop reason: SDUPTHER

## 2023-10-16 RX ORDER — DULOXETIN HYDROCHLORIDE 30 MG/1
30 CAPSULE, DELAYED RELEASE ORAL DAILY
Qty: 90 CAPSULE | Refills: 1 | Status: SHIPPED | OUTPATIENT
Start: 2023-10-16 | End: 2023-12-22 | Stop reason: SDUPTHER

## 2023-10-16 RX ORDER — LEVOCETIRIZINE DIHYDROCHLORIDE 5 MG/1
5 TABLET, FILM COATED ORAL NIGHTLY
Qty: 30 TABLET | Refills: 11 | Status: SHIPPED | OUTPATIENT
Start: 2023-10-16 | End: 2024-10-15

## 2023-10-16 NOTE — PATIENT INSTRUCTIONS
The Duloxetine is for your depression and for your bone pain.    You can take the atorvastatin, and Duloxetine together    Take mirtazapine- 1 pill, an hour before bed. This is to help with the sleep  Take levocetirizine at same time as mirtazapine, this one if for the congestion

## 2023-10-17 ENCOUNTER — TELEPHONE (OUTPATIENT)
Dept: ENDOSCOPY | Facility: HOSPITAL | Age: 68
End: 2023-10-17
Payer: MEDICARE

## 2023-10-17 NOTE — TELEPHONE ENCOUNTER
Pt was scheduled for colonoscopy on 11/7/23 with Dr. Laura. Contacted Pt due to change in scoping physician's schedule. Pt did not answer, all numbers attempted.  Spoke to Pt's son, left message stating Pt's appointment was canceled on 11/7/23, left direct line for Pt call back. Pt's son verbalized understanding.

## 2023-10-18 NOTE — PROGRESS NOTES
"  Patient Name: Bossman Kumari    : 1955  MRN: [id number]      Subjective:     Patient ID: Bossman is a 67 y.o. female    Chief Complaint:  No chief complaint on file.    67-year-old female comes in for follow-up on aortic atherosclerosis cirrhosis, depression, and multiple joint pains.  She is taking her statin medication.  However she is not taking duloxetine.  She states that she did not know what it was for.  She states that over the last several months, her depression has been getting worse.  She states that she also continues to have pains in her wrists, knees, and low back.  She is also having insomnia.  Patient also reports that her sister was diagnosed with colorectal cancer in the last few weeks.  She is due for colon cancer screening herself.         Review of Systems   Constitutional:  Negative for unexpected weight change.   HENT:  Negative for ear pain, mouth sores and sore throat.    Eyes:  Negative for visual disturbance.   Respiratory:  Negative for shortness of breath.    Cardiovascular:  Negative for chest pain.   Gastrointestinal:  Negative for abdominal pain and blood in stool.   Endocrine: Negative for cold intolerance and heat intolerance.   Genitourinary:  Negative for dysuria and frequency.   Musculoskeletal:  Positive for arthralgias, back pain, leg pain and myalgias.   Integumentary:  Negative for rash.   Neurological:  Negative for weakness, numbness and headaches.   Hematological:  Negative for adenopathy.   Psychiatric/Behavioral:  Positive for decreased concentration, dysphoric mood and sleep disturbance. Negative for hallucinations, self-injury and suicidal ideas. The patient is not nervous/anxious.         Objective:   /63 (BP Location: Right arm, Patient Position: Sitting, BP Method: Medium (Manual))   Pulse 67   Temp 97.8 °F (36.6 °C) (Oral)   Ht 5' 6" (1.676 m)   Wt 73.7 kg (162 lb 7.7 oz)   SpO2 98%   BMI 26.22 kg/m²     Physical Exam  Vitals reviewed. "   Constitutional:       General: She is not in acute distress.  HENT:      Head: Normocephalic and atraumatic.      Right Ear: Ear canal and external ear normal.      Left Ear: Ear canal and external ear normal.      Nose: Nose normal.   Eyes:      Extraocular Movements: Extraocular movements intact.      Conjunctiva/sclera: Conjunctivae normal.      Pupils: Pupils are equal, round, and reactive to light.   Cardiovascular:      Rate and Rhythm: Normal rate and regular rhythm.      Pulses: Normal pulses.      Heart sounds: Normal heart sounds.   Pulmonary:      Effort: Pulmonary effort is normal. No respiratory distress.      Breath sounds: No wheezing or rales.   Abdominal:      General: Abdomen is flat. Bowel sounds are normal. There is no distension.      Palpations: Abdomen is soft.      Tenderness: There is no abdominal tenderness. There is no guarding.   Musculoskeletal:      Cervical back: Normal range of motion. No rigidity or tenderness.   Lymphadenopathy:      Cervical: No cervical adenopathy.   Skin:     General: Skin is warm.      Capillary Refill: Capillary refill takes less than 2 seconds.   Neurological:      Mental Status: She is alert and oriented to person, place, and time.      Cranial Nerves: No cranial nerve deficit.      Sensory: No sensory deficit.   Psychiatric:         Mood and Affect: Mood normal.         Behavior: Behavior normal.        Assessment        ICD-10-CM ICD-9-CM   1. Aortic atherosclerosis  I70.0 440.0   2. Family history of colorectal cancer  Z80.0 V16.0   3. Encounter for screening for colorectal cancer in high risk patient  Z12.11 V76.51    Z91.89 V15.89    Z12.12 V76.41   4. Mild episode of recurrent major depressive disorder  F33.0 296.31   5. Primary osteoarthritis of both wrists  M19.031 715.13    M19.032    6. Primary osteoarthritis of both knees  M17.0 715.16   7. DDD (degenerative disc disease), lumbar  M51.36 722.52   8. Influenza vaccination declined  Z28.21 V64.06    9. Psychophysiological insomnia  F51.04 307.42   10. Sinus congestion  R09.81 478.19         Plan:     1. Aortic atherosclerosis  Overview:  12-  Chest two views: Heart size is normal.  There is aortic plaque    Orders:  -     atorvastatin (LIPITOR) 10 MG tablet; Take 1 tablet (10 mg total) by mouth once daily.  Dispense: 90 tablet; Refill: 3  Continue on statin daily.    2. Family history of colorectal cancer/ 3. Encounter for screening for colorectal cancer in high risk patient  -     Ambulatory referral/consult to Endo Procedure ; Future; Expected date: 10/17/2023  Discussed options for colorectal cancer screening.  We discussed colonoscopy verses stool testing and risks and benefits each.   Patient elected to proceed with Colonoscopy as patient has a family history of colon cancer, and stool testing not appropriate.    4. Mild episode of recurrent major depressive disorder  -     DULoxetine (CYMBALTA) 30 MG capsule; Take 1 capsule (30 mg total) by mouth once daily.  Dispense: 90 capsule; Refill: 1  Discussed with patient importance of compliance with Cymbalta.  She agrees to restart.  Will re-evaluate in the next few weeks.    5. Primary osteoarthritis of both wrists/ 6. Primary osteoarthritis of both knees/  7. DDD (degenerative disc disease), lumbar  -     DULoxetine (CYMBALTA) 30 MG capsule; Take 1 capsule (30 mg total) by mouth once daily.  Dispense: 90 capsule; Refill: 1  Restart Cymbalta for musculoskeletal pain.  Re-evaluate in the next few weeks.    8. Influenza vaccination declined    9. Psychophysiological insomnia  -     mirtazapine (REMERON) 7.5 MG Tab; Take 1 tablet (7.5 mg total) by mouth once daily. Take an hour before bedtime  Dispense: 30 tablet; Refill: 5  Start low-dose mirtazapine for insomnia.  Side effects discussed.    10. Sinus congestion  -     levocetirizine (XYZAL) 5 MG tablet; Take 1 tablet (5 mg total) by mouth every evening.  Dispense: 30 tablet; Refill: 11            -Shaun Justin Jr., MD, AAHIVS      This office note has been dictated.  This dictation has been generated using M-Modal Fluency Direct dictation; some phonetic errors may occur.         Patient Instructions   The Duloxetine is for your depression and for your bone pain.    You can take the atorvastatin, and Duloxetine together    Take mirtazapine- 1 pill, an hour before bed. This is to help with the sleep  Take levocetirizine at same time as mirtazapine, this one if for the congestion      Future Appointments   Date Time Provider Department Center   11/2/2023  1:00 PM WENDY SCHWARTZ [facility] OPHTHAL [facility]   11/2/2023  2:00 PM Seema Casas MD [facility] OPHTHAL [facility]   12/22/2023  9:20 AM Shaun Justin Jr., MD [facility]

## 2023-11-02 ENCOUNTER — CLINICAL SUPPORT (OUTPATIENT)
Dept: OPHTHALMOLOGY | Facility: CLINIC | Age: 68
End: 2023-11-02
Payer: MEDICARE

## 2023-11-02 ENCOUNTER — OFFICE VISIT (OUTPATIENT)
Dept: OPHTHALMOLOGY | Facility: CLINIC | Age: 68
End: 2023-11-02
Payer: MEDICARE

## 2023-11-02 ENCOUNTER — LAB VISIT (OUTPATIENT)
Dept: LAB | Facility: HOSPITAL | Age: 68
End: 2023-11-02
Attending: OPHTHALMOLOGY
Payer: MEDICARE

## 2023-11-02 DIAGNOSIS — H57.02 ANISOCORIA: ICD-10-CM

## 2023-11-02 DIAGNOSIS — H25.13 NUCLEAR SCLEROSIS OF BOTH EYES: ICD-10-CM

## 2023-11-02 DIAGNOSIS — H02.422 ACQUIRED MYOGENIC PTOSIS OF EYELID, LEFT: Primary | ICD-10-CM

## 2023-11-02 LAB
CREAT SERPL-MCNC: 0.9 MG/DL (ref 0.5–1.4)
EST. GFR  (NO RACE VARIABLE): >60 ML/MIN/1.73 M^2

## 2023-11-02 PROCEDURE — 1160F RVW MEDS BY RX/DR IN RCRD: CPT | Mod: CPTII,S$GLB,, | Performed by: OPHTHALMOLOGY

## 2023-11-02 PROCEDURE — 99204 OFFICE O/P NEW MOD 45 MIN: CPT | Mod: S$GLB,,, | Performed by: OPHTHALMOLOGY

## 2023-11-02 PROCEDURE — 1159F MED LIST DOCD IN RCRD: CPT | Mod: CPTII,S$GLB,, | Performed by: OPHTHALMOLOGY

## 2023-11-02 PROCEDURE — 92083 EXTENDED VISUAL FIELD XM: CPT | Mod: S$GLB,,, | Performed by: OPHTHALMOLOGY

## 2023-11-02 PROCEDURE — 1160F PR REVIEW ALL MEDS BY PRESCRIBER/CLIN PHARMACIST DOCUMENTED: ICD-10-PCS | Mod: CPTII,S$GLB,, | Performed by: OPHTHALMOLOGY

## 2023-11-02 PROCEDURE — 82565 ASSAY OF CREATININE: CPT | Performed by: OPHTHALMOLOGY

## 2023-11-02 PROCEDURE — 92285 EXTERNAL PHOTOGRAPHY - OU - BOTH EYES: ICD-10-PCS | Mod: S$GLB,,, | Performed by: OPHTHALMOLOGY

## 2023-11-02 PROCEDURE — 1159F PR MEDICATION LIST DOCUMENTED IN MEDICAL RECORD: ICD-10-PCS | Mod: CPTII,S$GLB,, | Performed by: OPHTHALMOLOGY

## 2023-11-02 PROCEDURE — 92285 EXTERNAL OCULAR PHOTOGRAPHY: CPT | Mod: S$GLB,,, | Performed by: OPHTHALMOLOGY

## 2023-11-02 PROCEDURE — 36415 COLL VENOUS BLD VENIPUNCTURE: CPT | Performed by: OPHTHALMOLOGY

## 2023-11-02 PROCEDURE — 99999 PR PBB SHADOW E&M-EST. PATIENT-LVL II: CPT | Mod: PBBFAC,,, | Performed by: OPHTHALMOLOGY

## 2023-11-02 PROCEDURE — 92083 GOLDMANN PERIMETRY - OU - EXTENDED - BOTH EYES: ICD-10-PCS | Mod: S$GLB,,, | Performed by: OPHTHALMOLOGY

## 2023-11-02 PROCEDURE — 99999 PR PBB SHADOW E&M-EST. PATIENT-LVL II: ICD-10-PCS | Mod: PBBFAC,,, | Performed by: OPHTHALMOLOGY

## 2023-11-02 PROCEDURE — 99204 PR OFFICE/OUTPT VISIT, NEW, LEVL IV, 45-59 MIN: ICD-10-PCS | Mod: S$GLB,,, | Performed by: OPHTHALMOLOGY

## 2023-11-02 NOTE — PROGRESS NOTES
MARY Kumari is a/an 67 y.o. female here for ptosis.  Referred by: Dr. Dunne   How long have eyelid(s) been droopy? Since she was born  Any h/o wearing hard CLs? no  Do eyelids feel heavy? yes  Does the height of the eyelid(s) fluctuate throughout the day? yes  Do eyelid(s) interfere with daily activities such as driving, reading,   working? yes  Spontaneous orbital pain? yes  Orbital pain w eye movement? no  Red eyes? yes  Red eyelids? no  Eyelid swelling? No    Patient presents with congenital ptosis of KOLTON. She sts the drooping never   bothered her until she got older and it is now hanging into her vision and   giving her difficulty with reading and driving.       Last edited by Franny Mendez on 11/2/2023  2:43 PM.            Assessment /Plan     For exam results, see Encounter Report.    Acquired myogenic ptosis of eyelid, left  -     External Photography - OU - Both Eyes  -     GOLDMANN PERIMETRY - OU - EXTENDED - BOTH EYES    Anisocoria  -     CTA Head and Neck (xpd); Future; Expected date: 11/02/2023  -     Creatinine, serum; Future; Expected date: 11/02/2023    Nuclear sclerosis of both eyes      The patient is a pleasant 67-year-old female here for evaluation of left upper eyelid heaviness.  This has been progressive and affects activities of daily living.  This has been ongoing since she was a child.  She recently has noticed that the heaviness is noticeable on a daily basis.  The patient has bilateral nuclear sclerosis.  The patient notices some issues with clarity while reading.      On exam, the patient has chronic frontalis use.  She has mild left brow elevation greater than right.  She has left upper eyelid acquired myogenic ptosis compared to the right.  She has mild bilateral upper eyelid dermatochalasis.  She has mild bilateral lower eyelid dermatochalasis with minimal herniation of orbital fat. The patient has anisocoria.     Pt. With significant improvement of left superior visual fields  with lids and brows taped vs. Untaped. Pt. With some improvement of right superior visual fields with lids and brows taped vs. untaped.    Recommend obtaining CTA of head and neck to rule out PCOM aneurysm and CT chest to rule out pancoast tumor.     These findings were discussed with the patient.     If imaging is negative, recommend bilateral external levator resection left greater than right in the operating room under local MAC.    Recommend follow-up with Dr. Mcghee or Dr. Neumann for anisocoria prior to any surgical intervention.     Informed consent obtained after extensive risks/benefits/alternatives were discussed with the patient including but not limited to pain, bleeding, infection, ocular injury, loss of the eye, asymmetry, need for revision in future, scarring.  Alternatives such as waiting were discussed.  All questions were answered.       Hold ASA, NSAIDS, fish oil, Vitamin E and Multivitamins 5 to 7 days prior to procedure.     Return for surgery

## 2023-11-05 ENCOUNTER — HOSPITAL ENCOUNTER (OUTPATIENT)
Dept: RADIOLOGY | Facility: HOSPITAL | Age: 68
Discharge: HOME | End: 2023-11-05
Attending: OPHTHALMOLOGY
Payer: MEDICARE

## 2023-11-05 DIAGNOSIS — H57.02 ANISOCORIA: ICD-10-CM

## 2023-11-05 PROCEDURE — 70496 CTA HEAD AND NECK (XPD): ICD-10-PCS | Mod: 26,,, | Performed by: RADIOLOGY

## 2023-11-05 PROCEDURE — 25500020 PHARM REV CODE 255: Performed by: OPHTHALMOLOGY

## 2023-11-05 PROCEDURE — 70496 CT ANGIOGRAPHY HEAD: CPT | Mod: TC

## 2023-11-05 PROCEDURE — 70498 CT ANGIOGRAPHY NECK: CPT | Mod: 26,,, | Performed by: RADIOLOGY

## 2023-11-05 PROCEDURE — 70496 CT ANGIOGRAPHY HEAD: CPT | Mod: 26,,, | Performed by: RADIOLOGY

## 2023-11-05 PROCEDURE — 70498 CTA HEAD AND NECK (XPD): ICD-10-PCS | Mod: 26,,, | Performed by: RADIOLOGY

## 2023-11-05 RX ADMIN — IOHEXOL 75 ML: 350 INJECTION, SOLUTION INTRAVENOUS at 09:11

## 2023-12-22 ENCOUNTER — TELEPHONE (OUTPATIENT)
Dept: OPHTHALMOLOGY | Facility: CLINIC | Age: 68
End: 2023-12-22
Payer: MEDICARE

## 2023-12-22 ENCOUNTER — OFFICE VISIT (OUTPATIENT)
Dept: FAMILY MEDICINE | Facility: CLINIC | Age: 68
End: 2023-12-22
Payer: MEDICARE

## 2023-12-22 VITALS
HEART RATE: 61 BPM | BODY MASS INDEX: 26.47 KG/M2 | OXYGEN SATURATION: 98 % | HEIGHT: 66 IN | WEIGHT: 164.69 LBS | SYSTOLIC BLOOD PRESSURE: 104 MMHG | TEMPERATURE: 99 F | DIASTOLIC BLOOD PRESSURE: 60 MMHG

## 2023-12-22 DIAGNOSIS — M54.42 ACUTE LEFT-SIDED LOW BACK PAIN WITH LEFT-SIDED SCIATICA: ICD-10-CM

## 2023-12-22 DIAGNOSIS — M19.032 PRIMARY OSTEOARTHRITIS OF BOTH WRISTS: Primary | Chronic | ICD-10-CM

## 2023-12-22 DIAGNOSIS — F33.0 MILD EPISODE OF RECURRENT MAJOR DEPRESSIVE DISORDER: Chronic | ICD-10-CM

## 2023-12-22 DIAGNOSIS — F51.04 PSYCHOPHYSIOLOGICAL INSOMNIA: Chronic | ICD-10-CM

## 2023-12-22 DIAGNOSIS — M17.0 PRIMARY OSTEOARTHRITIS OF BOTH KNEES: Chronic | ICD-10-CM

## 2023-12-22 DIAGNOSIS — Z91.89 ENCOUNTER FOR SCREENING FOR COLORECTAL CANCER IN HIGH RISK PATIENT: ICD-10-CM

## 2023-12-22 DIAGNOSIS — R73.9 HYPERGLYCEMIA: ICD-10-CM

## 2023-12-22 DIAGNOSIS — M19.031 PRIMARY OSTEOARTHRITIS OF BOTH WRISTS: Primary | Chronic | ICD-10-CM

## 2023-12-22 DIAGNOSIS — Z12.11 ENCOUNTER FOR SCREENING FOR COLORECTAL CANCER IN HIGH RISK PATIENT: ICD-10-CM

## 2023-12-22 DIAGNOSIS — I70.0 AORTIC ATHEROSCLEROSIS: Chronic | ICD-10-CM

## 2023-12-22 DIAGNOSIS — Z12.12 ENCOUNTER FOR SCREENING FOR COLORECTAL CANCER IN HIGH RISK PATIENT: ICD-10-CM

## 2023-12-22 PROCEDURE — 3078F DIAST BP <80 MM HG: CPT | Mod: CPTII,S$GLB,, | Performed by: FAMILY MEDICINE

## 2023-12-22 PROCEDURE — 1101F PT FALLS ASSESS-DOCD LE1/YR: CPT | Mod: CPTII,S$GLB,, | Performed by: FAMILY MEDICINE

## 2023-12-22 PROCEDURE — 3008F BODY MASS INDEX DOCD: CPT | Mod: CPTII,S$GLB,, | Performed by: FAMILY MEDICINE

## 2023-12-22 PROCEDURE — 1101F PR PT FALLS ASSESS DOC 0-1 FALLS W/OUT INJ PAST YR: ICD-10-PCS | Mod: CPTII,S$GLB,, | Performed by: FAMILY MEDICINE

## 2023-12-22 PROCEDURE — 99214 PR OFFICE/OUTPT VISIT, EST, LEVL IV, 30-39 MIN: ICD-10-PCS | Mod: 25,S$GLB,, | Performed by: FAMILY MEDICINE

## 2023-12-22 PROCEDURE — 96372 PR INJECTION,THERAP/PROPH/DIAG2ST, IM OR SUBCUT: ICD-10-PCS | Mod: S$GLB,,, | Performed by: FAMILY MEDICINE

## 2023-12-22 PROCEDURE — 1159F PR MEDICATION LIST DOCUMENTED IN MEDICAL RECORD: ICD-10-PCS | Mod: CPTII,S$GLB,, | Performed by: FAMILY MEDICINE

## 2023-12-22 PROCEDURE — 3288F FALL RISK ASSESSMENT DOCD: CPT | Mod: CPTII,S$GLB,, | Performed by: FAMILY MEDICINE

## 2023-12-22 PROCEDURE — 96372 THER/PROPH/DIAG INJ SC/IM: CPT | Mod: S$GLB,,, | Performed by: FAMILY MEDICINE

## 2023-12-22 PROCEDURE — 3288F PR FALLS RISK ASSESSMENT DOCUMENTED: ICD-10-PCS | Mod: CPTII,S$GLB,, | Performed by: FAMILY MEDICINE

## 2023-12-22 PROCEDURE — 3078F PR MOST RECENT DIASTOLIC BLOOD PRESSURE < 80 MM HG: ICD-10-PCS | Mod: CPTII,S$GLB,, | Performed by: FAMILY MEDICINE

## 2023-12-22 PROCEDURE — 1160F RVW MEDS BY RX/DR IN RCRD: CPT | Mod: CPTII,S$GLB,, | Performed by: FAMILY MEDICINE

## 2023-12-22 PROCEDURE — 99999 PR PBB SHADOW E&M-EST. PATIENT-LVL IV: ICD-10-PCS | Mod: PBBFAC,,, | Performed by: FAMILY MEDICINE

## 2023-12-22 PROCEDURE — 1126F AMNT PAIN NOTED NONE PRSNT: CPT | Mod: CPTII,S$GLB,, | Performed by: FAMILY MEDICINE

## 2023-12-22 PROCEDURE — 1160F PR REVIEW ALL MEDS BY PRESCRIBER/CLIN PHARMACIST DOCUMENTED: ICD-10-PCS | Mod: CPTII,S$GLB,, | Performed by: FAMILY MEDICINE

## 2023-12-22 PROCEDURE — 99214 OFFICE O/P EST MOD 30 MIN: CPT | Mod: 25,S$GLB,, | Performed by: FAMILY MEDICINE

## 2023-12-22 PROCEDURE — 1159F MED LIST DOCD IN RCRD: CPT | Mod: CPTII,S$GLB,, | Performed by: FAMILY MEDICINE

## 2023-12-22 PROCEDURE — 1126F PR PAIN SEVERITY QUANTIFIED, NO PAIN PRESENT: ICD-10-PCS | Mod: CPTII,S$GLB,, | Performed by: FAMILY MEDICINE

## 2023-12-22 PROCEDURE — 99999 PR PBB SHADOW E&M-EST. PATIENT-LVL IV: CPT | Mod: PBBFAC,,, | Performed by: FAMILY MEDICINE

## 2023-12-22 PROCEDURE — 3008F PR BODY MASS INDEX (BMI) DOCUMENTED: ICD-10-PCS | Mod: CPTII,S$GLB,, | Performed by: FAMILY MEDICINE

## 2023-12-22 PROCEDURE — 3074F SYST BP LT 130 MM HG: CPT | Mod: CPTII,S$GLB,, | Performed by: FAMILY MEDICINE

## 2023-12-22 PROCEDURE — 3074F PR MOST RECENT SYSTOLIC BLOOD PRESSURE < 130 MM HG: ICD-10-PCS | Mod: CPTII,S$GLB,, | Performed by: FAMILY MEDICINE

## 2023-12-22 RX ORDER — DULOXETIN HYDROCHLORIDE 30 MG/1
30 CAPSULE, DELAYED RELEASE ORAL DAILY
Qty: 90 CAPSULE | Refills: 1 | Status: SHIPPED | OUTPATIENT
Start: 2023-12-22 | End: 2024-12-21

## 2023-12-22 RX ORDER — KETOROLAC TROMETHAMINE 30 MG/ML
30 INJECTION, SOLUTION INTRAMUSCULAR; INTRAVENOUS
Status: COMPLETED | OUTPATIENT
Start: 2023-12-22 | End: 2023-12-22

## 2023-12-22 RX ORDER — ATORVASTATIN CALCIUM 10 MG/1
10 TABLET, FILM COATED ORAL DAILY
Qty: 90 TABLET | Refills: 3 | Status: SHIPPED | OUTPATIENT
Start: 2023-12-22 | End: 2023-12-22 | Stop reason: SDUPTHER

## 2023-12-22 RX ORDER — DULOXETIN HYDROCHLORIDE 30 MG/1
30 CAPSULE, DELAYED RELEASE ORAL DAILY
Qty: 90 CAPSULE | Refills: 1 | Status: SHIPPED | OUTPATIENT
Start: 2023-12-22 | End: 2023-12-22 | Stop reason: SDUPTHER

## 2023-12-22 RX ORDER — MIRTAZAPINE 7.5 MG/1
7.5 TABLET, FILM COATED ORAL DAILY
Qty: 90 TABLET | Refills: 1 | Status: SHIPPED | OUTPATIENT
Start: 2023-12-22 | End: 2023-12-22 | Stop reason: SDUPTHER

## 2023-12-22 RX ORDER — MIRTAZAPINE 7.5 MG/1
7.5 TABLET, FILM COATED ORAL DAILY
Qty: 90 TABLET | Refills: 1 | Status: SHIPPED | OUTPATIENT
Start: 2023-12-22 | End: 2024-12-21

## 2023-12-22 RX ORDER — ATORVASTATIN CALCIUM 10 MG/1
10 TABLET, FILM COATED ORAL DAILY
Qty: 90 TABLET | Refills: 3 | Status: SHIPPED | OUTPATIENT
Start: 2023-12-22 | End: 2024-12-21

## 2023-12-22 RX ADMIN — KETOROLAC TROMETHAMINE 30 MG: 30 INJECTION, SOLUTION INTRAMUSCULAR; INTRAVENOUS at 10:12

## 2023-12-22 NOTE — PATIENT INSTRUCTIONS
The atorvastatin is to help decrease the risk of a heart attack and/or stroke.    The Duloxetine (Cymbaltya) is to help with the feelings of restlessness, low mood, and also for the muscle and joint pains.    The mirtazapine is to help you go to sleep    In the morning take the atorvastatin and the Cymbalta.  Take mirtazapine 1 to 1.5 hours prior to bedtime    If you have a hard time getting any of your medications from the pharmacy, call us, even if the pharmacy says they will.

## 2023-12-22 NOTE — TELEPHONE ENCOUNTER
----- Message from Franny Mendez sent at 12/22/2023  9:25 AM CST -----  Neuro team,   Can we get this pt in for eval of aniscoria which is recommended prior to any surgical intervention?     Rashmi,   Once she has her appt can we get her a date for surgery? Looks like she was seen on 11/2 but I think you were out that day so make sure you have that consent.   ----- Message -----  From: Sudha Nixon  Sent: 12/22/2023   9:21 AM CST  To: [facility] Staff    Consult/Advisory    Name Of Caller:Bossman       Contact Preference:[phone]    Nature of call: Ptn called regarding her yearly appt please call to assist

## 2023-12-25 PROBLEM — F51.04 PSYCHOPHYSIOLOGICAL INSOMNIA: Chronic | Status: ACTIVE | Noted: 2023-12-25

## 2023-12-26 NOTE — PROGRESS NOTES
"  Patient Name: Bossman Kumari    : 1955  MRN: [id number]      Subjective:     Patient ID: Bossman is a 68 y.o. female    Chief Complaint:  cymbalta follow up     68-year-old female presents for follow-up on Cymbalta use for musculoskeletal pain.  However comes in only picked up medication and has not any refill.  She has been off of it for now.  Get it yet.  She is having recurrent wrist pains and knee pain once again.    Patient also reports that for the last few days she has been having left-sided low back pain with radiation to her left leg.  He reports no falls or injuries.  She reports no weakness in the leg.         Review of Systems   Constitutional:  Negative for unexpected weight change.   HENT:  Negative for ear pain, mouth sores and sore throat.    Eyes:  Negative for visual disturbance.   Respiratory:  Negative for shortness of breath.    Cardiovascular:  Negative for chest pain.   Gastrointestinal:  Negative for abdominal pain and blood in stool.   Endocrine: Negative for cold intolerance and heat intolerance.   Genitourinary:  Negative for dysuria and frequency.   Musculoskeletal:  Positive for arthralgias, back pain, leg pain and myalgias.   Integumentary:  Negative for rash.   Neurological:  Negative for weakness, numbness and headaches.   Hematological:  Negative for adenopathy.   Psychiatric/Behavioral:  Positive for decreased concentration, dysphoric mood and sleep disturbance. Negative for hallucinations, self-injury and suicidal ideas. The patient is not nervous/anxious.         Objective:   /60 (BP Location: Right arm, Patient Position: Sitting, BP Method: Medium (Manual))   Pulse 61   Temp 98.5 °F (36.9 °C) (Oral)   Ht 5' 6" (1.676 m)   Wt 74.7 kg (164 lb 10.9 oz)   SpO2 98%   BMI 26.58 kg/m²     Physical Exam  Vitals reviewed.   Constitutional:       General: She is not in acute distress.  HENT:      Head: Normocephalic and atraumatic.      Right Ear: Ear canal and " external ear normal.      Left Ear: Ear canal and external ear normal.      Nose: Nose normal.   Eyes:      Extraocular Movements: Extraocular movements intact.      Conjunctiva/sclera: Conjunctivae normal.      Pupils: Pupils are equal, round, and reactive to light.   Cardiovascular:      Rate and Rhythm: Normal rate and regular rhythm.      Pulses: Normal pulses.      Heart sounds: Normal heart sounds.   Pulmonary:      Effort: Pulmonary effort is normal. No respiratory distress.      Breath sounds: No wheezing or rales.   Abdominal:      General: Abdomen is flat. Bowel sounds are normal. There is no distension.      Palpations: Abdomen is soft.      Tenderness: There is no abdominal tenderness. There is no guarding.   Musculoskeletal:      Cervical back: Normal range of motion. No rigidity or tenderness.   Lymphadenopathy:      Cervical: No cervical adenopathy.   Skin:     General: Skin is warm.      Capillary Refill: Capillary refill takes less than 2 seconds.   Neurological:      Mental Status: She is alert and oriented to person, place, and time.      Cranial Nerves: No cranial nerve deficit.      Sensory: No sensory deficit.   Psychiatric:         Mood and Affect: Mood normal.         Behavior: Behavior normal.        Assessment        ICD-10-CM ICD-9-CM   1. Primary osteoarthritis of both wrists  M19.031 715.13    M19.032    2. Primary osteoarthritis of both knees  M17.0 715.16   3. Aortic atherosclerosis  I70.0 440.0   4. Mild episode of recurrent major depressive disorder  F33.0 296.31   5. Psychophysiological insomnia  F51.04 307.42   6. Acute left-sided low back pain with left-sided sciatica  M54.42 724.2     724.3   7. Encounter for screening for colorectal cancer in high risk patient  Z12.11 V76.51    Z91.89 V15.89    Z12.12 V76.41   8. Hyperglycemia  R73.9 790.29         Plan:     1. Primary osteoarthritis of both wrists  Assessment & Plan:  Patient has not picked up fluoxetine in over six months.   Recommended patient restart duloxetine and we can re-evaluate pain when she had been on it again for several weeks.     Orders:  -     Discontinue: DULoxetine (CYMBALTA) 30 MG capsule; Take 1 capsule (30 mg total) by mouth once daily.  Dispense: 90 capsule; Refill: 1  -     DULoxetine (CYMBALTA) 30 MG capsule; Take 1 capsule (30 mg total) by mouth once daily.  Dispense: 90 capsule; Refill: 1  -     CBC Auto Differential; Future; Expected date: 05/22/2024    2. Primary osteoarthritis of both knees  Assessment & Plan:  Patient has not picked up fluoxetine in over six months.  Recommended patient restart duloxetine and we can re-evaluate pain when she had been on it again for several weeks.     Orders:  -     Discontinue: DULoxetine (CYMBALTA) 30 MG capsule; Take 1 capsule (30 mg total) by mouth once daily.  Dispense: 90 capsule; Refill: 1  -     DULoxetine (CYMBALTA) 30 MG capsule; Take 1 capsule (30 mg total) by mouth once daily.  Dispense: 90 capsule; Refill: 1  -     CBC Auto Differential; Future; Expected date: 05/22/2024    3. Aortic atherosclerosis  Overview:  12-  Chest two views: Heart size is normal.  There is aortic plaque    Assessment & Plan:  Patient advised to continue atorvastatin.    Orders:  -     Discontinue: atorvastatin (LIPITOR) 10 MG tablet; Take 1 tablet (10 mg total) by mouth once daily.  Dispense: 90 tablet; Refill: 3  -     atorvastatin (LIPITOR) 10 MG tablet; Take 1 tablet (10 mg total) by mouth once daily.  Dispense: 90 tablet; Refill: 3  -     Comprehensive Metabolic Panel; Future; Expected date: 12/22/2023  -     Comprehensive Metabolic Panel; Future; Expected date: 05/22/2024  -     CBC Auto Differential; Future; Expected date: 05/22/2024  -     Lipid Panel; Future; Expected date: 05/22/2024    4. Mild episode of recurrent major depressive disorder  Assessment & Plan:  Strongly recommended patient to restart duloxetine.     Orders:  -     Discontinue: DULoxetine (CYMBALTA) 30 MG  capsule; Take 1 capsule (30 mg total) by mouth once daily.  Dispense: 90 capsule; Refill: 1  -     DULoxetine (CYMBALTA) 30 MG capsule; Take 1 capsule (30 mg total) by mouth once daily.  Dispense: 90 capsule; Refill: 1  -     CBC Auto Differential; Future; Expected date: 05/22/2024    5. Psychophysiological insomnia  Assessment & Plan:  Reports improvement with Remeron.  Will continue.    Orders:  -     Discontinue: mirtazapine (REMERON) 7.5 MG Tab; Take 1 tablet (7.5 mg total) by mouth once daily. Take an hour before bedtime  Dispense: 90 tablet; Refill: 1  -     mirtazapine (REMERON) 7.5 MG Tab; Take 1 tablet (7.5 mg total) by mouth once daily. Take an hour before bedtime  Dispense: 90 tablet; Refill: 1    6. Acute left-sided low back pain with left-sided sciatica  -     ketorolac injection 30 mg  Patient requested injection for one time dose of Toradol ordered and administered by nurse.    7. Encounter for screening for colorectal cancer in high risk patient  -     Ambulatory referral/consult to Endo Procedure ; Future; Expected date: 12/23/2023  Discussed options for colorectal cancer screening.  We discussed colonoscopy verses stool testing and risks and benefits each.   Patient elected to proceed with Colonoscopy. Order placed.    8. Hyperglycemia  -     Hemoglobin A1C; Future; Expected date: 05/22/2024  Please lab has shown hyperglycemia.  Will check a1c.             -Shaun Justin Jr., MD, AAHIVS      This office note has been dictated.  This dictation has been generated using M-Modal Fluency Direct dictation; some phonetic errors may occur.         Patient Instructions   The atorvastatin is to help decrease the risk of a heart attack and/or stroke.    The Duloxetine (Cymbaltya) is to help with the feelings of restlessness, low mood, and also for the muscle and joint pains.    The mirtazapine is to help you go to sleep    In the morning take the atorvastatin and the Cymbalta.  Take mirtazapine 1  to 1.5 hours prior to bedtime    If you have a hard time getting any of your medications from the pharmacy, call us, even if the pharmacy says they will.     Follow Up  Follow up in about 21 weeks (around 5/17/2024) for Chronic Problems, labs a week prior.    Future Appointments   Date Time Provider Department Center   1/17/2024 11:00 AM PRE-ADMIT NURSE 2, ENDO -[facility] ENDOP[facility]   4/3/2024  1:00 PM Shey Neumann MD [facility]   5/10/2024  9:00 AM LAB, [facility]   5/17/2024 10:00 AM Shaun Justin Jr., MD [facility]

## 2023-12-26 NOTE — ASSESSMENT & PLAN NOTE
Patient has not picked up fluoxetine in over six months.  Recommended patient restart duloxetine and we can re-evaluate pain when she had been on it again for several weeks.

## 2023-12-28 ENCOUNTER — TELEPHONE (OUTPATIENT)
Dept: OPHTHALMOLOGY | Facility: CLINIC | Age: 68
End: 2023-12-28
Payer: MEDICARE

## 2024-01-08 ENCOUNTER — TELEPHONE (OUTPATIENT)
Dept: OPHTHALMOLOGY | Facility: CLINIC | Age: 69
End: 2024-01-08
Payer: MEDICARE

## 2024-01-08 NOTE — TELEPHONE ENCOUNTER
Called to [facility] pt sx there was no answer a message was left 2nd attempt

## 2024-01-17 ENCOUNTER — TELEPHONE (OUTPATIENT)
Dept: ENDOSCOPY | Facility: HOSPITAL | Age: 69
End: 2024-01-17

## 2024-01-17 ENCOUNTER — CLINICAL SUPPORT (OUTPATIENT)
Dept: ENDOSCOPY | Facility: HOSPITAL | Age: 69
End: 2024-01-17
Attending: FAMILY MEDICINE
Payer: MEDICARE

## 2024-01-17 DIAGNOSIS — Z12.12 ENCOUNTER FOR SCREENING FOR COLORECTAL CANCER IN HIGH RISK PATIENT: ICD-10-CM

## 2024-01-17 DIAGNOSIS — Z12.11 ENCOUNTER FOR SCREENING FOR COLORECTAL CANCER IN HIGH RISK PATIENT: ICD-10-CM

## 2024-01-17 DIAGNOSIS — Z91.89 ENCOUNTER FOR SCREENING FOR COLORECTAL CANCER IN HIGH RISK PATIENT: ICD-10-CM

## 2024-01-17 NOTE — PLAN OF CARE
Contacted patient to schedule colonoscopy. Patient states she is busy at this time and will call us back. # [phone] given to call. Patient verbalized understanding.

## 2024-01-17 NOTE — TELEPHONE ENCOUNTER
Contacted patient to schedule colonoscopy. Patient states she is busy at this time and will call us back. # [phone] given to call. Patient verbalized understanding.

## 2024-04-10 NOTE — PROGRESS NOTES
Ptosis GVF/External photos done ou./ chart checked for latex allergy.-[facility]

## 2024-04-25 ENCOUNTER — PATIENT OUTREACH (OUTPATIENT)
Dept: ADMINISTRATIVE | Facility: HOSPITAL | Age: 69
End: 2024-04-25
Payer: MEDICARE

## 2024-04-25 DIAGNOSIS — Z12.31 BREAST CANCER SCREENING BY MAMMOGRAM: Primary | ICD-10-CM

## 2024-04-25 NOTE — PROGRESS NOTES
PAT completed pt will follow up and sched colonoscopy at a later date. Gap report updated.Immunization's updated/triggered.

## 2024-06-21 ENCOUNTER — HOSPITAL ENCOUNTER (EMERGENCY)
Facility: HOSPITAL | Age: 69
Discharge: HOME | End: 2024-06-21
Attending: INTERNAL MEDICINE
Payer: MEDICARE

## 2024-06-21 VITALS
DIASTOLIC BLOOD PRESSURE: 56 MMHG | TEMPERATURE: 98 F | SYSTOLIC BLOOD PRESSURE: 121 MMHG | OXYGEN SATURATION: 97 % | HEIGHT: 66 IN | BODY MASS INDEX: 26.36 KG/M2 | RESPIRATION RATE: 18 BRPM | HEART RATE: 76 BPM | WEIGHT: 164 LBS

## 2024-06-21 DIAGNOSIS — M54.31 SCIATICA OF RIGHT SIDE: Primary | ICD-10-CM

## 2024-06-21 PROCEDURE — 63600175 PHARM REV CODE 636 W HCPCS: Mod: ER | Performed by: INTERNAL MEDICINE

## 2024-06-21 PROCEDURE — 99284 EMERGENCY DEPT VISIT MOD MDM: CPT | Mod: 25,ER

## 2024-06-21 PROCEDURE — 96372 THER/PROPH/DIAG INJ SC/IM: CPT | Performed by: INTERNAL MEDICINE

## 2024-06-21 RX ORDER — GABAPENTIN 300 MG/1
300 CAPSULE ORAL NIGHTLY
Qty: 30 CAPSULE | Refills: 0 | Status: SHIPPED | OUTPATIENT
Start: 2024-06-21 | End: 2024-07-21

## 2024-06-21 RX ORDER — IBUPROFEN 600 MG/1
600 TABLET ORAL EVERY 8 HOURS PRN
Qty: 30 TABLET | Refills: 0 | Status: SHIPPED | OUTPATIENT
Start: 2024-06-21

## 2024-06-21 RX ORDER — DEXAMETHASONE SODIUM PHOSPHATE 4 MG/ML
8 INJECTION, SOLUTION INTRA-ARTICULAR; INTRALESIONAL; INTRAMUSCULAR; INTRAVENOUS; SOFT TISSUE
Status: COMPLETED | OUTPATIENT
Start: 2024-06-21 | End: 2024-06-21

## 2024-06-21 RX ORDER — KETOROLAC TROMETHAMINE 30 MG/ML
30 INJECTION, SOLUTION INTRAMUSCULAR; INTRAVENOUS
Status: COMPLETED | OUTPATIENT
Start: 2024-06-21 | End: 2024-06-21

## 2024-06-21 RX ADMIN — DEXAMETHASONE SODIUM PHOSPHATE 8 MG: 4 INJECTION INTRA-ARTICULAR; INTRALESIONAL; INTRAMUSCULAR; INTRAVENOUS; SOFT TISSUE at 02:06

## 2024-06-21 RX ADMIN — KETOROLAC TROMETHAMINE 30 MG: 30 INJECTION, SOLUTION INTRAMUSCULAR at 02:06

## 2024-06-21 NOTE — ED PROVIDER NOTES
Encounter Date: 6/21/2024       History     Chief Complaint   Patient presents with    Hip Pain     Pt c/o pain in R hip and R hand x 3 days; denies injury     68-year-old female presents to emergency department complaining of right lower back pain radiating to her right lower extremity for the past 3 days.  She denies focal weakness/fever/chills/nausea/vomiting/chest pain/shortness of breath.  She states pain is worse upon ambulating and denies trauma.  She states she has not taken any medication for pain relief, but awoke from her sleep this morning and could not return to sleep secondary to pain.    The history is provided by the patient. No  was used.     Review of patient's allergies indicates:  No Known Allergies  Past Medical History:   Diagnosis Date    Acute cholecystitis 12/10/2015    Cholelithiasis 12/10/2015    DDD (degenerative disc disease), lumbar     Family history of diabetes mellitus type II 11/11/2015    GERD (gastroesophageal reflux disease)     Nuclear sclerosis of both eyes 6/13/2019    Unspecified asthma, with exacerbation     Urticaria     Vaginal delivery     x4     Past Surgical History:   Procedure Laterality Date    CHOLECYSTECTOMY      TOTAL ABDOMINAL HYSTERECTOMY  1981     Family History   Problem Relation Name Age of Onset    Hypertension Mother      Diabetes Mother      Cataracts Mother      Hypertension Father      Cataracts Father      No Known Problems Sister      No Known Problems Brother      No Known Problems Maternal Aunt      No Known Problems Maternal Uncle      No Known Problems Paternal Aunt      No Known Problems Paternal Uncle      No Known Problems Maternal Grandmother      No Known Problems Maternal Grandfather      No Known Problems Paternal Grandmother      No Known Problems Paternal Grandfather      No Known Problems Other      Amblyopia Neg Hx      Blindness Neg Hx      Cancer Neg Hx      Glaucoma Neg Hx      Macular degeneration Neg Hx       Retinal detachment Neg Hx      Strabismus Neg Hx      Stroke Neg Hx      Thyroid disease Neg Hx      Melanoma Neg Hx       Social History     Tobacco Use    Smoking status: Never    Smokeless tobacco: Never   Substance Use Topics    Alcohol use: No    Drug use: No     Review of Systems   Constitutional:  Negative for chills and fever.   Respiratory:  Negative for shortness of breath.    Cardiovascular:  Negative for chest pain.   Musculoskeletal:  Positive for back pain. Negative for joint swelling and neck pain.   All other systems reviewed and are negative.      Physical Exam     Initial Vitals [06/21/24 0216]   BP Pulse Resp Temp SpO2   113/76 74 20 97.9 °F (36.6 °C) 97 %      MAP       --         Physical Exam    Nursing note and vitals reviewed.  Constitutional: She is not diaphoretic. No distress.   HENT:   Head: Normocephalic and atraumatic.   Right Ear: External ear normal.   Left Ear: External ear normal.   Eyes: Conjunctivae are normal.   Neck:   Normal range of motion.  Cardiovascular:  Normal rate and regular rhythm.           Pulmonary/Chest: Breath sounds normal. No respiratory distress.   Abdominal: Abdomen is soft. Bowel sounds are normal.   Musculoskeletal:      Cervical back: Normal range of motion.     Neurological: She is alert. She has normal strength.   Skin: Skin is warm and dry. Capillary refill takes less than 2 seconds.   Psychiatric: She has a normal mood and affect.         ED Course   Procedures  Labs Reviewed - No data to display       Imaging Results    None          Medications   dexAMETHasone injection 8 mg (8 mg Intramuscular Given 6/21/24 0255)   ketorolac injection 30 mg (30 mg Intramuscular Given 6/21/24 0255)     Medical Decision Making  68-year-old female presents to emergency department complaining of right lower back pain radiating to her right lower extremity for the past 3 days.  She denies focal weakness/fever/chills/nausea/vomiting/chest pain/shortness of breath.  She  states pain is worse upon ambulating and denies trauma.  She states she has not taken any medication for pain relief, but awoke from her sleep this morning and could not return to sleep secondary to pain.  Course of ED stay:   Exam today reveals no emergent cause for low back pain radiating to right lower extremity.  Instructions for sciatica were given and patient received Toradol/Decadron in the emergency department as well as prescriptions for ibuprofen/gabapentin.  She was advised to follow-up with her primary care physician within the next week for re-evaluation/return to the emergency department if condition worsens.    Risk  Prescription drug management.                                      Clinical Impression:  Final diagnoses:  [M54.31] Sciatica of right side (Primary)          ED Disposition Condition    Discharge Stable          ED Prescriptions       Medication Sig Dispense Start Date End Date Auth. Provider    ibuprofen (ADVIL,MOTRIN) 600 MG tablet Take 1 tablet (600 mg total) by mouth every 8 (eight) hours as needed for Pain. 30 tablet 6/21/2024 -- Santos Fam MD    gabapentin (NEURONTIN) 300 MG capsule Take 1 capsule (300 mg total) by mouth every evening. 30 capsule 6/21/2024 7/21/2024 Santos Fam MD          Follow-up Information       Follow up With Specialties Details Why Contact Info    Shaun Justin Jr., MD Family Medicine Schedule an appointment as soon as possible for a visit in 1 week For reevaluation [address] [facility] [address]  [phone]               Santos Fam MD  06/21/24 2636

## 2024-06-24 ENCOUNTER — PATIENT OUTREACH (OUTPATIENT)
Dept: EMERGENCY MEDICINE | Facility: HOSPITAL | Age: 69
End: 2024-06-24
Payer: MEDICARE

## 2024-06-24 NOTE — PROGRESS NOTES
Patient was seen in the ED on 6/21/24. Phoned patient to assist with Post ED Discharge Navigation. Patient agreed to assistance scheduling a PCP follow-up appointment. In Basket message sent to PCP staff for scheduling assistance.Appointment scheduled. Appointment reminder set.  Nica Boudreaux

## 2024-07-05 ENCOUNTER — PATIENT OUTREACH (OUTPATIENT)
Dept: EMERGENCY MEDICINE | Facility: HOSPITAL | Age: 69
End: 2024-07-05
Payer: MEDICARE

## 2024-07-05 NOTE — PROGRESS NOTES
Reminded patient of upcoming appointment on 7/8/24 1:20 with Dr Justin [facility] [address], [place] [address] [place] [id number] [phone].  Nica Boudreaux

## 2024-07-05 NOTE — PROGRESS NOTES
Phoned patient for follow-up. Patient was unavailable. Message left on voice mail urging patient to call if additional assistance is needed.  Nica Boudreaux

## 2024-08-10 ENCOUNTER — OFFICE VISIT (OUTPATIENT)
Dept: URGENT CARE | Facility: CLINIC | Age: 69
End: 2024-08-10
Payer: MEDICARE

## 2024-08-10 VITALS
SYSTOLIC BLOOD PRESSURE: 105 MMHG | HEIGHT: 66 IN | OXYGEN SATURATION: 95 % | TEMPERATURE: 99 F | BODY MASS INDEX: 26.19 KG/M2 | WEIGHT: 162.94 LBS | RESPIRATION RATE: 18 BRPM | HEART RATE: 98 BPM | DIASTOLIC BLOOD PRESSURE: 72 MMHG

## 2024-08-10 DIAGNOSIS — J02.0 STREP PHARYNGITIS: ICD-10-CM

## 2024-08-10 DIAGNOSIS — J06.9 UPPER RESPIRATORY TRACT INFECTION, UNSPECIFIED TYPE: ICD-10-CM

## 2024-08-10 DIAGNOSIS — R05.9 COUGH, UNSPECIFIED TYPE: Primary | ICD-10-CM

## 2024-08-10 LAB
CTP QC/QA: YES
MOLECULAR STREP A: POSITIVE
POC MOLECULAR INFLUENZA A AGN: NEGATIVE
POC MOLECULAR INFLUENZA B AGN: NEGATIVE
SARS-COV-2 AG RESP QL IA.RAPID: NEGATIVE

## 2024-08-10 PROCEDURE — 87651 STREP A DNA AMP PROBE: CPT | Mod: QW,,, | Performed by: FAMILY MEDICINE

## 2024-08-10 PROCEDURE — 87502 INFLUENZA DNA AMP PROBE: CPT | Mod: QW,,, | Performed by: FAMILY MEDICINE

## 2024-08-10 PROCEDURE — 99214 OFFICE O/P EST MOD 30 MIN: CPT | Mod: ,,, | Performed by: FAMILY MEDICINE

## 2024-08-10 PROCEDURE — 87811 SARS-COV-2 COVID19 W/OPTIC: CPT | Mod: QW,,, | Performed by: FAMILY MEDICINE

## 2024-08-10 RX ORDER — PENICILLIN V POTASSIUM 500 MG/1
500 TABLET, FILM COATED ORAL 2 TIMES DAILY
Qty: 20 TABLET | Refills: 0 | Status: SHIPPED | OUTPATIENT
Start: 2024-08-10

## 2024-08-10 RX ORDER — IBUPROFEN 600 MG/1
600 TABLET ORAL EVERY 8 HOURS PRN
Qty: 21 TABLET | Refills: 0 | Status: SHIPPED | OUTPATIENT
Start: 2024-08-10

## 2024-08-10 NOTE — PROGRESS NOTES
"Subjective:      Patient ID: Bossman Kumari is a 68 y.o. female.    Vitals:  height is 5' 6" (1.676 m) and weight is 73.9 kg (162 lb 14.7 oz). Her oral temperature is 98.7 °F (37.1 °C). Her blood pressure is 105/72 and her pulse is 98. Her respiration is 18 and oxygen saturation is 95%.     Chief Complaint: Headache    Pt states she has been congested and with a headache since yesterday . She also reports a sore throat. Pt took OTC night quil with no relief.    Cough  This is a new problem. The current episode started yesterday. The problem has been unchanged. The cough is Productive of sputum. Associated symptoms include nasal congestion, postnasal drip and a sore throat. Nothing aggravates the symptoms. Treatments tried: night quil.       HENT:  Positive for postnasal drip and sore throat.    Respiratory:  Positive for cough.       Objective:     Physical Exam   Constitutional: She is oriented to person, place, and time. She appears well-developed. She is cooperative.  Non-toxic appearance. She does not appear ill. No distress.   HENT:   Head: Normocephalic and atraumatic.   Ears:   Right Ear: Hearing, tympanic membrane, external ear and ear canal normal.   Left Ear: Hearing, tympanic membrane, external ear and ear canal normal.   Nose: Nose normal. No mucosal edema, rhinorrhea or nasal deformity. No epistaxis. Right sinus exhibits no maxillary sinus tenderness and no frontal sinus tenderness. Left sinus exhibits no maxillary sinus tenderness and no frontal sinus tenderness.   Mouth/Throat: Uvula is midline and mucous membranes are normal. No trismus in the jaw. Normal dentition. No uvula swelling. Posterior oropharyngeal erythema present. No oropharyngeal exudate or posterior oropharyngeal edema.   Eyes: Conjunctivae and lids are normal. No scleral icterus.   Neck: Trachea normal and phonation normal. Neck supple. No edema present. No erythema present. No neck rigidity present.   Cardiovascular: Normal rate, " regular rhythm, normal heart sounds and normal pulses.   Pulmonary/Chest: Effort normal and breath sounds normal. No respiratory distress. She has no decreased breath sounds. She has no rhonchi.   Abdominal: Normal appearance.   Musculoskeletal: Normal range of motion.         General: No deformity. Normal range of motion.   Neurological: She is alert and oriented to person, place, and time. She exhibits normal muscle tone. Coordination normal.   Skin: Skin is warm, dry, intact, not diaphoretic and not pale.   Psychiatric: Her speech is normal and behavior is normal. Judgment and thought content normal.   Nursing note and vitals reviewed.      Assessment:     1. Cough, unspecified type    2. Strep pharyngitis    3. Upper respiratory tract infection, unspecified type        Plan:       Cough, unspecified type  -     SARS Coronavirus 2 Antigen, POCT Manual Read  -     POCT Strep A, Molecular  -     POCT Influenza A/B MOLECULAR    Strep pharyngitis    Upper respiratory tract infection, unspecified type  -     POCT Strep A, Molecular  -     POCT Influenza A/B MOLECULAR    Other orders  -     ibuprofen (ADVIL,MOTRIN) 600 MG tablet; Take 1 tablet (600 mg total) by mouth every 8 (eight) hours as needed for Pain.  Dispense: 21 tablet; Refill: 0  -     penicillin v potassium (VEETID) 500 MG tablet; Take 1 tablet (500 mg total) by mouth 2 (two) times daily.  Dispense: 20 tablet; Refill: 0

## 2024-10-03 ENCOUNTER — HOSPITAL ENCOUNTER (EMERGENCY)
Facility: HOSPITAL | Age: 69
Discharge: HOME | End: 2024-10-03
Attending: STUDENT IN AN ORGANIZED HEALTH CARE EDUCATION/TRAINING PROGRAM
Payer: MEDICARE

## 2024-10-03 VITALS
SYSTOLIC BLOOD PRESSURE: 117 MMHG | DIASTOLIC BLOOD PRESSURE: 77 MMHG | RESPIRATION RATE: 20 BRPM | BODY MASS INDEX: 22.5 KG/M2 | TEMPERATURE: 98 F | HEIGHT: 66 IN | WEIGHT: 140 LBS | OXYGEN SATURATION: 98 % | HEART RATE: 73 BPM

## 2024-10-03 DIAGNOSIS — R05.9 COUGH: ICD-10-CM

## 2024-10-03 LAB
CTP QC/QA: YES
INFLUENZA A ANTIGEN, POC: NEGATIVE
INFLUENZA B ANTIGEN, POC: NEGATIVE
SARS-COV-2 RDRP RESP QL NAA+PROBE: NEGATIVE

## 2024-10-03 PROCEDURE — 25000003 PHARM REV CODE 250: Mod: ER | Performed by: STUDENT IN AN ORGANIZED HEALTH CARE EDUCATION/TRAINING PROGRAM

## 2024-10-03 PROCEDURE — 87635 SARS-COV-2 COVID-19 AMP PRB: CPT | Mod: ER | Performed by: STUDENT IN AN ORGANIZED HEALTH CARE EDUCATION/TRAINING PROGRAM

## 2024-10-03 PROCEDURE — 99284 EMERGENCY DEPT VISIT MOD MDM: CPT | Mod: 25,ER

## 2024-10-03 PROCEDURE — 87804 INFLUENZA ASSAY W/OPTIC: CPT | Mod: 59,ER

## 2024-10-03 RX ORDER — BENZONATATE 100 MG/1
200 CAPSULE ORAL
Status: COMPLETED | OUTPATIENT
Start: 2024-10-03 | End: 2024-10-03

## 2024-10-03 RX ORDER — GUAIFENESIN AND DEXTROMETHORPHAN HYDROBROMIDE 10; 100 MG/5ML; MG/5ML
10 SYRUP ORAL
Status: DISCONTINUED | OUTPATIENT
Start: 2024-10-03 | End: 2024-10-03

## 2024-10-03 RX ORDER — PROMETHAZINE HYDROCHLORIDE AND DEXTROMETHORPHAN HYDROBROMIDE 6.25; 15 MG/5ML; MG/5ML
5 SYRUP ORAL EVERY 6 HOURS PRN
Qty: 200 ML | Refills: 0 | Status: SHIPPED | OUTPATIENT
Start: 2024-10-03 | End: 2024-10-13

## 2024-10-03 RX ORDER — BENZONATATE 100 MG/1
100 CAPSULE ORAL 3 TIMES DAILY PRN
Qty: 30 CAPSULE | Refills: 0 | Status: SHIPPED | OUTPATIENT
Start: 2024-10-03 | End: 2024-10-13

## 2024-10-03 RX ADMIN — BENZONATATE 200 MG: 100 CAPSULE ORAL at 02:10

## 2024-10-03 NOTE — ED PROVIDER NOTES
Encounter Date: 10/3/2024       History     Chief Complaint   Patient presents with    Cough     Pt has had a cough for the past month; seen at urgent care on 8/10, dx'd with strep throat, rx'd abx but cough has persisted since then     HPI  Patient was a 68-year-old female presenting to the emergency department for evaluation of persistent cough over the last month.  She was treated with amoxicillin last month for strep throat.  She finished the antibiotic but the cough has persisted.  She denies sore throat at that time.  She denies nausea, vomiting, chest pain, shortness for breath, dysuria, hematuria, leg swelling, syncope, other medication use, sick contacts, hemoptysis.  No other mitigating or exacerbating factors.  Review of patient's allergies indicates:  No Known Allergies  Past Medical History:   Diagnosis Date    Acute cholecystitis 12/10/2015    Cholelithiasis 12/10/2015    DDD (degenerative disc disease), lumbar     Family history of diabetes mellitus type II 11/11/2015    GERD (gastroesophageal reflux disease)     Nuclear sclerosis of both eyes 6/13/2019    Unspecified asthma, with exacerbation     Urticaria     Vaginal delivery     x4     Past Surgical History:   Procedure Laterality Date    CHOLECYSTECTOMY      TOTAL ABDOMINAL HYSTERECTOMY  1981     Family History   Problem Relation Name Age of Onset    Hypertension Mother      Diabetes Mother      Cataracts Mother      Hypertension Father      Cataracts Father      No Known Problems Sister      No Known Problems Brother      No Known Problems Maternal Aunt      No Known Problems Maternal Uncle      No Known Problems Paternal Aunt      No Known Problems Paternal Uncle      No Known Problems Maternal Grandmother      No Known Problems Maternal Grandfather      No Known Problems Paternal Grandmother      No Known Problems Paternal Grandfather      No Known Problems Other      Amblyopia Neg Hx      Blindness Neg Hx      Cancer Neg Hx      Glaucoma Neg  Hx      Macular degeneration Neg Hx      Retinal detachment Neg Hx      Strabismus Neg Hx      Stroke Neg Hx      Thyroid disease Neg Hx      Melanoma Neg Hx       Social History     Tobacco Use    Smoking status: Never    Smokeless tobacco: Never   Substance Use Topics    Alcohol use: No    Drug use: No     Review of Systems  See HPI  Physical Exam     Initial Vitals [10/03/24 0150]   BP Pulse Resp Temp SpO2   117/77 73 20 97.6 °F (36.4 °C) 98 %      MAP       --         Physical Exam    Nursing note and vitals reviewed.  Constitutional: She appears well-developed and well-nourished. She is not diaphoretic. No distress.   HENT:   Head: Normocephalic and atraumatic.   Right Ear: External ear normal.   Left Ear: External ear normal.   Nose: Nose normal. Mouth/Throat: Oropharynx is clear and moist. No oropharyngeal exudate.   Eyes: Conjunctivae are normal. No scleral icterus.   Neck: Neck supple. No tracheal deviation present.   Normal range of motion.  Cardiovascular:  Normal rate, regular rhythm, normal heart sounds and intact distal pulses.     Exam reveals no gallop and no friction rub.       No murmur heard.  Pulmonary/Chest: Breath sounds normal. She has no wheezes. She has no rhonchi. She has no rales.   Abdominal: Abdomen is soft. Bowel sounds are normal. There is no abdominal tenderness.   Musculoskeletal:      Cervical back: Normal range of motion and neck supple.     Neurological: She is alert and oriented to person, place, and time. GCS score is 15. GCS eye subscore is 4. GCS verbal subscore is 5. GCS motor subscore is 6.   Skin: Skin is warm and dry.   No jaundice   Psychiatric: She has a normal mood and affect. Thought content normal.         ED Course   Procedures  Labs Reviewed   SARS-COV-2 RDRP GENE       Result Value    POC Rapid COVID Negative       Acceptable Yes      Narrative:     This test utilizes isothermal nucleic acid amplification technology to detect the SARS-CoV-2 RdRp  nucleic acid segment. The analytical sensitivity (limit of detection) is 500 copies/swab.     A POSITIVE result is indicative of the presence of SARS-CoV-2 RNA; clinical correlation with patient history and other diagnostic information is necessary to determine patient infection status.    A NEGATIVE result means that SARS-CoV-2 nucleic acids are not present above the limit of detection. A NEGATIVE result should be treated as presumptive. It does not rule out the possibility of COVID-19 and should not be the sole basis for treatment decisions. If COVID-19 is strongly suspected based on clinical and exposure history, re-testing using an alternate molecular assay should be considered.     Commercial kits are provided by [employer].       POCT INFLUENZA A/B MOLECULAR   POCT RAPID INFLUENZA A/B    Influenza B Ag negative      Inflenza A Ag negative            Imaging Results              X-Ray Chest PA And Lateral (Final result)  Result time 10/03/24 02:10:50      Final result by Mari Jett MD (10/03/24 02:10:50)                   Impression:      No radiographic evidence of acute intra-thoracic process.      Electronically signed by: Mari Jett MD  Date:    10/03/2024  Time:    02:10               Narrative:    EXAMINATION:  XR CHEST PA AND LATERAL    CLINICAL HISTORY:  Cough, unspecified    TECHNIQUE:  PA and lateral views of the chest were performed.    COMPARISON:  12/20/2022    FINDINGS:  The cardiomediastinal silhouette is within normal limits. Mediastinal structures are midline.  The lungs appear symmetrically aerated without definite focal alveolar consolidation. No large pleural effusion or pneumothorax is appreciated.Visualized osseous structures are intact with degenerative change.                                       Medications   dextromethorphan-guaiFENesin  mg/5 ml liquid 10 mL (has no administration in time range)   benzonatate capsule 200 mg (has no administration in time range)      Medical Decision Making             ED Course as of 10/03/24 0220   u Oct 03, 2024   0215 X-Ray Chest PA And Lateral  Independent interpretation of chest x-ray: The cardiac silhouette is within normal limits.   There is no focal consolidation, pneumothorax, or pleural effusion.  No acute cardiopulmonary process noted. [CC]   0218 Differential Diagnosis includes, but is not limited to:  Viral URI, Strep pharyngitis, viral pharyngitis, foreign body aspiration/ingestion, bronchitis, asthma exacerbation, CHF exacerbation, COPD exacerbation, allergy/atopy, influenza, pertussis, PE, pneumonia, lung abscess, fungal infection, TB, epiglottitis.  [CC]   0218 Patient presenting with a persistent cough over the last month.  Clear sputum.  She otherwise looks well.  Afebrile, no leukocytosis, not tachycardic.  No chest pain or shortness for breath.  Chest x-ray is negative.  COVID and flu were negative.  We will treat with Tessalon Perles and promethazine DM syrup and have her follow up with a doctor for further evaluation.  I do not believe further workup is indicated in the ER today.  Strict return precautions given. I believe patient is appropriate for discharge and continued outpatient evaulation/treatment.  I discussed with the patient/family the diagnosis, treatment plan, indications for return to the emergency department, and for expected follow-up. The patient/family verbalized an understanding. The patient/family  asked if there are any questions or concerns. We discuss the case, until all issues are addressed to the patient/family's satisfaction. Patient/family understands and is agreeable to the plan. Patient is stable and ready for discharge.   [CC]      ED Course User Index  [CC] Von Nelson MD                           Clinical Impression:  Final diagnoses:  [R05.9] Cough          ED Disposition Condition    Discharge Stable          ED Prescriptions       Medication Sig Dispense Start Date End Date  Auth. Provider    benzonatate (TESSALON) 100 MG capsule Take 1 capsule (100 mg total) by mouth 3 (three) times daily as needed for Cough. 30 capsule 10/3/2024 10/13/2024 Von Nelson MD    promethazine-dextromethorphan (PROMETHAZINE-DM) 6.25-15 mg/5 mL Syrp Take 5 mLs by mouth every 6 (six) hours as needed (Cough). 200 mL 10/3/2024 10/13/2024 Von Nelson MD          Follow-up Information       Follow up With Specialties Details Why Contact Info    Shaun Justin Jr., MD Family Medicine Schedule an appointment as soon as possible for a visit in 3 days  [address] [facility] [address]  [phone]      [facility] ED Emergency Medicine Go to  If symptoms worsen [address] [facility] [address] [phone]             Von Nelson MD  10/03/24 0222

## 2024-10-04 ENCOUNTER — PATIENT OUTREACH (OUTPATIENT)
Dept: EMERGENCY MEDICINE | Facility: HOSPITAL | Age: 69
End: 2024-10-04
Payer: MEDICARE

## 2024-10-04 NOTE — PROGRESS NOTES
Patient visited the ED on 10/3/2024.  Patient declined Post ED 7 day PCP, Shaun Justin Jr., MD, appointment.

## 2024-11-01 ENCOUNTER — PATIENT OUTREACH (OUTPATIENT)
Dept: EMERGENCY MEDICINE | Facility: HOSPITAL | Age: 69
End: 2024-11-01
Payer: MEDICARE

## 2024-11-06 ENCOUNTER — NURSE TRIAGE (OUTPATIENT)
Dept: ADMINISTRATIVE | Facility: CLINIC | Age: 69
End: 2024-11-06
Payer: MEDICARE

## 2024-11-06 NOTE — TELEPHONE ENCOUNTER
Pt called with c/o left leg pain x 2 weeks, worse at night. Pt denies any swelling, injury or deformity. Care advice recommends pt be seen within 3 days, no apps available. Pt requesting call back from PCP office.  Reason for Disposition   MODERATE pain (e.g., interferes with normal activities, limping) and present > 3 days    Additional Information   Negative: Looks like a broken bone or dislocated joint (e.g., crooked or deformed)   Negative: Sounds like a life-threatening emergency to the triager   Negative: Chest pain   Negative: Difficulty breathing   Negative: Entire foot is cool or blue in comparison to other side   Negative: Unable to walk   Negative: Fever and red area (or area very tender to touch)   Negative: Swollen joint and fever   Negative: Thigh or calf pain in only one leg and present > 1 hour   Negative: Thigh, calf, or ankle swelling in only one leg   Negative: Thigh, calf, or ankle swelling in both legs, but one side is definitely more swollen   Negative: History of prior 'blood clot' in leg or lungs (i.e., deep vein thrombosis, pulmonary embolism)   Negative: History of inherited increased risk of blood clots (e.g., factor 5 Leiden, antithrombin 3, protein C or protein S deficiency, prothrombin mutation)   Negative: Major surgery in past month   Negative: Hip or leg fracture (broken bone) in past month (or had cast on leg or ankle in past month)   Negative: Illness requiring prolonged bedrest in past month (e.g., immobilization, long hospital stay)   Negative: Long-distance travel in past month (e.g., car, bus, train, plane; with trip lasting 6 or more hours)   Negative: Cancer treatment in past six months (or has cancer now)   Negative: Patient sounds very sick or weak to the triager   Negative: SEVERE pain (e.g., excruciating, unable to do any normal activities)   Negative: Cast on leg or ankle and now has increasing pain   Negative: Red area or streak > 2 inches (or 5 cm)   Negative: Painful  rash with multiple small blisters grouped together (i.e., dermatomal distribution or 'band' or 'stripe')   Negative: Looks like a boil, infected sore, deep ulcer, or other infected rash (spreading redness, pus)   Negative: Localized rash is very painful (no fever)   Negative: Numbness in a leg or foot (i.e., loss of sensation)   Negative: Localized pain, redness or hard lump along vein   Negative: Patient wants to be seen    Protocols used: Leg Pain-A-OH

## 2024-12-02 ENCOUNTER — TELEPHONE (OUTPATIENT)
Dept: FAMILY MEDICINE | Facility: CLINIC | Age: 69
End: 2024-12-02
Payer: MEDICARE

## 2024-12-02 NOTE — TELEPHONE ENCOUNTER
----- Message from Kecia sent at 12/2/2024  4:42 PM CST -----   Name of Who is Calling:     What is the request in detail:  patient request call back in reference to colonoscopy orders     Please contact to further discuss and advise      Can the clinic reply by [facility]:     What Number to Call Back if not in [facility]:   [phone]

## 2024-12-02 NOTE — TELEPHONE ENCOUNTER
Spoke with pt, requesting colonoscopy order. Pt has appt with NP at [place] location tomorrow, advised pt to speak with provider tomorrow about colonoscopy. haile

## 2024-12-04 ENCOUNTER — OFFICE VISIT (OUTPATIENT)
Dept: FAMILY MEDICINE | Facility: CLINIC | Age: 69
End: 2024-12-04
Payer: MEDICARE

## 2024-12-04 ENCOUNTER — LAB VISIT (OUTPATIENT)
Dept: LAB | Facility: HOSPITAL | Age: 69
End: 2024-12-04
Attending: FAMILY MEDICINE
Payer: MEDICARE

## 2024-12-04 VITALS
BODY MASS INDEX: 27.01 KG/M2 | WEIGHT: 167.31 LBS | HEART RATE: 79 BPM | DIASTOLIC BLOOD PRESSURE: 62 MMHG | OXYGEN SATURATION: 98 % | SYSTOLIC BLOOD PRESSURE: 112 MMHG | RESPIRATION RATE: 18 BRPM

## 2024-12-04 DIAGNOSIS — Z28.21 VACCINATION DECLINED: ICD-10-CM

## 2024-12-04 DIAGNOSIS — M19.032 PRIMARY OSTEOARTHRITIS OF BOTH WRISTS: Chronic | ICD-10-CM

## 2024-12-04 DIAGNOSIS — I70.0 AORTIC ATHEROSCLEROSIS: Chronic | ICD-10-CM

## 2024-12-04 DIAGNOSIS — Z12.31 ENCOUNTER FOR SCREENING MAMMOGRAM FOR MALIGNANT NEOPLASM OF BREAST: ICD-10-CM

## 2024-12-04 DIAGNOSIS — Z12.11 SCREENING FOR COLORECTAL CANCER: ICD-10-CM

## 2024-12-04 DIAGNOSIS — F51.04 PSYCHOPHYSIOLOGICAL INSOMNIA: Chronic | ICD-10-CM

## 2024-12-04 DIAGNOSIS — Z12.12 SCREENING FOR COLORECTAL CANCER: ICD-10-CM

## 2024-12-04 DIAGNOSIS — F33.0 MILD EPISODE OF RECURRENT MAJOR DEPRESSIVE DISORDER: Chronic | ICD-10-CM

## 2024-12-04 DIAGNOSIS — M19.031 PRIMARY OSTEOARTHRITIS OF BOTH WRISTS: Chronic | ICD-10-CM

## 2024-12-04 DIAGNOSIS — R73.9 HYPERGLYCEMIA: ICD-10-CM

## 2024-12-04 DIAGNOSIS — Z00.00 WELL ADULT EXAM: ICD-10-CM

## 2024-12-04 DIAGNOSIS — R09.81 SINUS CONGESTION: ICD-10-CM

## 2024-12-04 DIAGNOSIS — Z00.00 WELL ADULT EXAM: Primary | ICD-10-CM

## 2024-12-04 DIAGNOSIS — M17.0 PRIMARY OSTEOARTHRITIS OF BOTH KNEES: Chronic | ICD-10-CM

## 2024-12-04 LAB
ALBUMIN SERPL BCP-MCNC: 3.5 G/DL (ref 3.5–5.2)
ALP SERPL-CCNC: 111 U/L (ref 40–150)
ALT SERPL W/O P-5'-P-CCNC: 16 U/L (ref 10–44)
ANION GAP SERPL CALC-SCNC: 10 MMOL/L (ref 8–16)
AST SERPL-CCNC: 23 U/L (ref 10–40)
BASOPHILS # BLD AUTO: 0.05 K/UL (ref 0–0.2)
BASOPHILS NFR BLD: 0.8 % (ref 0–1.9)
BILIRUB SERPL-MCNC: 0.3 MG/DL (ref 0.1–1)
BUN SERPL-MCNC: 11 MG/DL (ref 8–23)
CALCIUM SERPL-MCNC: 9.3 MG/DL (ref 8.7–10.5)
CHLORIDE SERPL-SCNC: 109 MMOL/L (ref 95–110)
CHOLEST SERPL-MCNC: 118 MG/DL (ref 120–199)
CHOLEST/HDLC SERPL: 3.4 {RATIO} (ref 2–5)
CO2 SERPL-SCNC: 24 MMOL/L (ref 23–29)
CREAT SERPL-MCNC: 0.9 MG/DL (ref 0.5–1.4)
DIFFERENTIAL METHOD BLD: ABNORMAL
EOSINOPHIL # BLD AUTO: 0.4 K/UL (ref 0–0.5)
EOSINOPHIL NFR BLD: 6.6 % (ref 0–8)
ERYTHROCYTE [DISTWIDTH] IN BLOOD BY AUTOMATED COUNT: 13.3 % (ref 11.5–14.5)
EST. GFR  (NO RACE VARIABLE): >60 ML/MIN/1.73 M^2
GLUCOSE SERPL-MCNC: 87 MG/DL (ref 70–110)
HCT VFR BLD AUTO: 40.9 % (ref 37–48.5)
HDLC SERPL-MCNC: 35 MG/DL (ref 40–75)
HDLC SERPL: 29.7 % (ref 20–50)
HGB BLD-MCNC: 12.4 G/DL (ref 12–16)
IMM GRANULOCYTES # BLD AUTO: 0.02 K/UL (ref 0–0.04)
IMM GRANULOCYTES NFR BLD AUTO: 0.3 % (ref 0–0.5)
LDLC SERPL CALC-MCNC: 71.8 MG/DL (ref 63–159)
LYMPHOCYTES # BLD AUTO: 2.4 K/UL (ref 1–4.8)
LYMPHOCYTES NFR BLD: 36.8 % (ref 18–48)
MCH RBC QN AUTO: 29 PG (ref 27–31)
MCHC RBC AUTO-ENTMCNC: 30.3 G/DL (ref 32–36)
MCV RBC AUTO: 96 FL (ref 82–98)
MONOCYTES # BLD AUTO: 0.8 K/UL (ref 0.3–1)
MONOCYTES NFR BLD: 12.2 % (ref 4–15)
NEUTROPHILS # BLD AUTO: 2.8 K/UL (ref 1.8–7.7)
NEUTROPHILS NFR BLD: 43.3 % (ref 38–73)
NONHDLC SERPL-MCNC: 83 MG/DL
NRBC BLD-RTO: 0 /100 WBC
PLATELET # BLD AUTO: 310 K/UL (ref 150–450)
PMV BLD AUTO: 11.4 FL (ref 9.2–12.9)
POTASSIUM SERPL-SCNC: 4.2 MMOL/L (ref 3.5–5.1)
PROT SERPL-MCNC: 7.9 G/DL (ref 6–8.4)
RBC # BLD AUTO: 4.28 M/UL (ref 4–5.4)
SODIUM SERPL-SCNC: 143 MMOL/L (ref 136–145)
TRIGL SERPL-MCNC: 56 MG/DL (ref 30–150)
WBC # BLD AUTO: 6.38 K/UL (ref 3.9–12.7)

## 2024-12-04 PROCEDURE — 3044F HG A1C LEVEL LT 7.0%: CPT | Mod: CPTII,S$GLB,, | Performed by: FAMILY MEDICINE

## 2024-12-04 PROCEDURE — 99999 PR PBB SHADOW E&M-EST. PATIENT-LVL IV: CPT | Mod: PBBFAC,,, | Performed by: FAMILY MEDICINE

## 2024-12-04 PROCEDURE — 1125F AMNT PAIN NOTED PAIN PRSNT: CPT | Mod: CPTII,S$GLB,, | Performed by: FAMILY MEDICINE

## 2024-12-04 PROCEDURE — 3074F SYST BP LT 130 MM HG: CPT | Mod: CPTII,S$GLB,, | Performed by: FAMILY MEDICINE

## 2024-12-04 PROCEDURE — 80061 LIPID PANEL: CPT | Performed by: FAMILY MEDICINE

## 2024-12-04 PROCEDURE — 3288F FALL RISK ASSESSMENT DOCD: CPT | Mod: CPTII,S$GLB,, | Performed by: FAMILY MEDICINE

## 2024-12-04 PROCEDURE — 85025 COMPLETE CBC W/AUTO DIFF WBC: CPT | Performed by: FAMILY MEDICINE

## 2024-12-04 PROCEDURE — 3008F BODY MASS INDEX DOCD: CPT | Mod: CPTII,S$GLB,, | Performed by: FAMILY MEDICINE

## 2024-12-04 PROCEDURE — 1159F MED LIST DOCD IN RCRD: CPT | Mod: CPTII,S$GLB,, | Performed by: FAMILY MEDICINE

## 2024-12-04 PROCEDURE — 83036 HEMOGLOBIN GLYCOSYLATED A1C: CPT | Performed by: FAMILY MEDICINE

## 2024-12-04 PROCEDURE — 36415 COLL VENOUS BLD VENIPUNCTURE: CPT | Mod: PN | Performed by: FAMILY MEDICINE

## 2024-12-04 PROCEDURE — 80053 COMPREHEN METABOLIC PANEL: CPT | Performed by: FAMILY MEDICINE

## 2024-12-04 PROCEDURE — 3078F DIAST BP <80 MM HG: CPT | Mod: CPTII,S$GLB,, | Performed by: FAMILY MEDICINE

## 2024-12-04 PROCEDURE — 99397 PER PM REEVAL EST PAT 65+ YR: CPT | Mod: S$GLB,,, | Performed by: FAMILY MEDICINE

## 2024-12-04 PROCEDURE — 1101F PT FALLS ASSESS-DOCD LE1/YR: CPT | Mod: CPTII,S$GLB,, | Performed by: FAMILY MEDICINE

## 2024-12-04 PROCEDURE — 1160F RVW MEDS BY RX/DR IN RCRD: CPT | Mod: CPTII,S$GLB,, | Performed by: FAMILY MEDICINE

## 2024-12-04 RX ORDER — LEVOCETIRIZINE DIHYDROCHLORIDE 5 MG/1
5 TABLET, FILM COATED ORAL NIGHTLY
Qty: 90 TABLET | Refills: 3 | Status: SHIPPED | OUTPATIENT
Start: 2024-12-04 | End: 2025-12-04

## 2024-12-04 RX ORDER — DULOXETIN HYDROCHLORIDE 30 MG/1
30 CAPSULE, DELAYED RELEASE ORAL DAILY
Qty: 90 CAPSULE | Refills: 3 | Status: SHIPPED | OUTPATIENT
Start: 2024-12-04 | End: 2025-12-04

## 2024-12-04 RX ORDER — ATORVASTATIN CALCIUM 10 MG/1
10 TABLET, FILM COATED ORAL DAILY
Qty: 90 TABLET | Refills: 3 | Status: SHIPPED | OUTPATIENT
Start: 2024-12-04 | End: 2025-12-04

## 2024-12-04 RX ORDER — MIRTAZAPINE 7.5 MG/1
7.5 TABLET, FILM COATED ORAL DAILY
Qty: 90 TABLET | Refills: 3 | Status: SHIPPED | OUTPATIENT
Start: 2024-12-04 | End: 2025-12-04

## 2024-12-04 NOTE — LETTER
12/05/2024      RE: Bossman Kumari      To whom it may concern:    Mrs. Bossman Kumari is a patient of mine. 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 He has multiple medical conditions and has required assistance with his health care.    Mrs. Bossman uKmari, may return to work with no restrictions starting January 2nd, 2025.    Regards,    Shaun Justin Jr., MD, AAHIVS  [facility]  [address] [place] [address]  [facility] [place] [address]  Dept: [phone]

## 2024-12-05 LAB
ESTIMATED AVG GLUCOSE: 103 MG/DL (ref 68–131)
HBA1C MFR BLD: 5.2 % (ref 4–5.6)

## 2024-12-08 NOTE — PROGRESS NOTES
Patient Name: Bossman Kumari    : 1955  MRN: [id number]      Subjective:     Patient ID: Bossman is a 69 y.o. female    Chief Complaint:  Leg Pain and Nasal Congestion    History of Present Illness    Bossman presents today for annual health maintenance examination and medication refills and leg pain.    She is out of atorvastatin for cholesterol and needs a nasal spray. She is not taking her depression medication.    She reports pain and tingling sensations in her legs. She reports not using her Duloxetine as she was not aware she had refills.     She reports experiencing a lot of mucus and congestion, for last few days. No other URI symptoms reported.     ROS:  General: -fever, -chills, -fatigue, -weight gain, -weight loss  Eyes: -vision changes, -redness, -discharge  ENT: -ear pain, +nasal congestion, -sore throat  Cardiovascular: -chest pain, -palpitations, -lower extremity edema  Respiratory: -cough, -shortness of breath  Gastrointestinal: -abdominal pain, -nausea, -vomiting, -diarrhea, -constipation, -blood in stool  Genitourinary: -dysuria, -hematuria, -frequency  Musculoskeletal: -joint pain, +muscle pain  Skin: -rash, -lesion  Neurological: -headache, -dizziness, -numbness, +tingling  Psychiatric: -anxiety, -depression, -sleep difficulty           Objective:   /62 (BP Location: Left arm, Patient Position: Sitting)   Pulse 79   Resp 18   Wt 75.9 kg (167 lb 5.3 oz)   SpO2 98%   BMI 27.01 kg/m²     Physical Exam  Vitals reviewed.   Constitutional:       General: She is not in acute distress.  HENT:      Head: Normocephalic and atraumatic.      Right Ear: Ear canal and external ear normal.      Left Ear: Ear canal and external ear normal.      Nose: Nose normal.   Eyes:      Extraocular Movements: Extraocular movements intact.      Conjunctiva/sclera: Conjunctivae normal.      Pupils: Pupils are equal, round, and reactive to light.   Cardiovascular:      Rate and Rhythm: Normal rate and  regular rhythm.      Pulses: Normal pulses.      Heart sounds: Normal heart sounds.   Pulmonary:      Effort: Pulmonary effort is normal. No respiratory distress.      Breath sounds: No wheezing or rales.   Abdominal:      General: Abdomen is flat. Bowel sounds are normal. There is no distension.      Palpations: Abdomen is soft.      Tenderness: There is no abdominal tenderness. There is no guarding.   Musculoskeletal:      Cervical back: Normal range of motion. No rigidity or tenderness.   Lymphadenopathy:      Cervical: No cervical adenopathy.   Skin:     General: Skin is warm.      Capillary Refill: Capillary refill takes less than 2 seconds.   Neurological:      Mental Status: She is alert and oriented to person, place, and time.      Cranial Nerves: No cranial nerve deficit.      Sensory: No sensory deficit.   Psychiatric:         Mood and Affect: Mood normal.         Behavior: Behavior normal.          Assessment        ICD-10-CM ICD-9-CM   1. Well adult exam  Z00.00 V70.0   2. Vaccination declined  Z28.21 V64.06   3. Encounter for screening mammogram for malignant neoplasm of breast  Z12.31 V76.12   4. Screening for colorectal cancer  Z12.11 V76.51    Z12.12 V76.41   5. Aortic atherosclerosis  I70.0 440.0   6. Mild episode of recurrent major depressive disorder  F33.0 296.31   7. Primary osteoarthritis of both wrists  M19.031 715.13    M19.032    8. Primary osteoarthritis of both knees  M17.0 715.16   9. Psychophysiological insomnia  F51.04 307.42   10. Sinus congestion  R09.81 478.19   11. 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  Bossman to increase fluid intake to help with cold symptoms.  Recommend using OTC chest rub cautiously, as it did not provide relief previously.  Started Xyzal for sinus congestion.  Recommend OTC Robitussin or Mucinex for cold symptoms, Zyrtec or Claritin for allergy symptoms and to dry up mucus, and nasal saline spray, 2-3 times daily for nasal dryness.    EPISTAXIS (NOSEBLEEDS):  Discussed that nosebleeds are often due to dry nasal passages.    HYPERLIPIDEMIA:  Continued atorvastatin for cholesterol management.    DEPRESSION AND JOINT PAIN:  Continued duloxetine for depression and joint pain.  Continued mirtazapine for insomnia.    GENERAL HEALTH SCREENING:  Blood work ordered to be completed before leaving the office.    FOLLOW-UP:  Follow up in 6 months for depression management.  Follow up on 's appointment scheduled for tomorrow at 11:10 AM to evaluate for work note.  Contact the office after 's evaluation for potential return to work note for patient, possibly for Monday or after the holiday.           1. Well adult exam  -     Comprehensive Metabolic Panel; Future; Expected date: 12/04/2024  -     Lipid Panel; Future; Expected date: 12/04/2024  -     CBC Auto Differential; Future; Expected date: 12/04/2024  Age appropriate screenings, vaccinations, and recommendations reviewed and discussed.  Appropriate orders placed and previous results reviewed.    2. Vaccination declined  Declined recommended vaccination.    3. Encounter for screening mammogram for malignant neoplasm of breast  -     Mammo Digital Screening Bilat w/ Raman; Future; Expected date: 12/04/2024  Breast cancer screening  ordered    4. Screening for colorectal cancer  -     Ambulatory referral/consult to Endo Procedure ; Future; Expected date: 12/05/2024  Discussed options for colorectal cancer screening.  We discussed colonoscopy verses stool testing and risks and benefits each.   Patient elected to proceed with Colonoscopy. Order placed.    5. Aortic atherosclerosis  Overview:  12-  Chest two views: Heart size is normal.  There is aortic plaque    Orders:  -     atorvastatin (LIPITOR) 10 MG tablet; Take 1 tablet (10 mg total) by mouth once daily.  Dispense: 90 tablet; Refill: 3  -     Comprehensive Metabolic Panel; Future; Expected date: 12/04/2024  -     Lipid Panel; Future; Expected date: 12/04/2024  -     CBC Auto Differential; Future; Expected date: 12/04/2024  Recommended continue atorvastatin.    6. Mild episode of recurrent major depressive disorder  -     DULoxetine (CYMBALTA) 30 MG capsule; Take 1 capsule (30 mg total) by mouth once daily.  Dispense: 90 capsule; Refill: 3  -     CBC Auto Differential; Future; Expected date: 12/04/2024  Recommend patient to restart duloxetine.    Informed patient that there are refills available once prescription finishes.    7. Primary osteoarthritis of both wrists/ 8. Primary osteoarthritis of both knees  -     DULoxetine (CYMBALTA) 30 MG capsule; Take 1 capsule (30 mg total) by mouth once daily.  Dispense: 90 capsule; Refill: 3  -     Comprehensive Metabolic Panel; Future; Expected date: 12/04/2024  -     CBC Auto Differential; Future; Expected date: 12/04/2024  Restart duloxetine.    9. Psychophysiological insomnia  -     mirtazapine (REMERON) 7.5 MG Tab; Take 1 tablet (7.5 mg total) by mouth once daily. Take an hour before bedtime  Dispense: 90 tablet; Refill: 3  -     Comprehensive Metabolic Panel; Future; Expected date: 12/04/2024  -     CBC Auto Differential; Future; Expected date: 12/04/2024    10. Sinus congestion  -     levocetirizine (XYZAL) 5 MG tablet; Take 1  tablet (5 mg total) by mouth every evening.  Dispense: 90 tablet; Refill: 3  Start Xyzal as needed.    11. Hyperglycemia  -     Hemoglobin A1C; Future; Expected date: 12/04/2024  Check A1c           -Shaun Justin Jr., MD, AAHIVS      This note was generated with the assistance of ambient listening technology. Verbal consent was obtained by the patient and accompanying visitor(s) for the recording of patient appointment to facilitate this note. I attest to having reviewed and edited the generated note for accuracy, though some syntax or spelling errors may persist. Please contact the author of this note for any clarification.      There are no Patient Instructions on file for this visit.      Follow up in about 6 months (around 6/4/2025) for Depression.   Future Appointments   Date Time Provider Department Center   12/26/2024 10:20 AM [facility] MAMMO1 [facility] MAMMO [place]   1/8/2025  1:00 PM Keesha Dunne, [place] [place]   6/4/2025 10:00 AM Shaun Justin Jr., MD [facility] [place]  [place]            Xerosis Aggressive Treatment: I recommended application of Cetaphil or CeraVe numerous times a day going to bed to all dry areas. I also prescribed a topical steroid for twice daily use.

## 2025-02-05 ENCOUNTER — HOSPITAL ENCOUNTER (EMERGENCY)
Facility: HOSPITAL | Age: 70
Discharge: HOME | End: 2025-02-06
Attending: INTERNAL MEDICINE
Payer: MEDICARE

## 2025-02-05 VITALS
RESPIRATION RATE: 18 BRPM | HEIGHT: 67 IN | WEIGHT: 140 LBS | OXYGEN SATURATION: 98 % | DIASTOLIC BLOOD PRESSURE: 88 MMHG | BODY MASS INDEX: 21.97 KG/M2 | TEMPERATURE: 98 F | SYSTOLIC BLOOD PRESSURE: 135 MMHG | HEART RATE: 67 BPM

## 2025-02-05 DIAGNOSIS — M54.32 SCIATICA, LEFT SIDE: Primary | ICD-10-CM

## 2025-02-05 PROCEDURE — 63600175 PHARM REV CODE 636 W HCPCS: Mod: ER | Performed by: INTERNAL MEDICINE

## 2025-02-05 PROCEDURE — 96372 THER/PROPH/DIAG INJ SC/IM: CPT | Performed by: INTERNAL MEDICINE

## 2025-02-05 PROCEDURE — 99284 EMERGENCY DEPT VISIT MOD MDM: CPT | Mod: 25,ER

## 2025-02-05 RX ORDER — KETOROLAC TROMETHAMINE 30 MG/ML
30 INJECTION, SOLUTION INTRAMUSCULAR; INTRAVENOUS
Status: COMPLETED | OUTPATIENT
Start: 2025-02-05 | End: 2025-02-05

## 2025-02-05 RX ORDER — IBUPROFEN 800 MG/1
800 TABLET ORAL EVERY 8 HOURS PRN
Qty: 30 TABLET | Refills: 0 | Status: SHIPPED | OUTPATIENT
Start: 2025-02-05

## 2025-02-05 RX ORDER — DEXAMETHASONE SODIUM PHOSPHATE 4 MG/ML
4 INJECTION, SOLUTION INTRA-ARTICULAR; INTRALESIONAL; INTRAMUSCULAR; INTRAVENOUS; SOFT TISSUE
Status: COMPLETED | OUTPATIENT
Start: 2025-02-05 | End: 2025-02-05

## 2025-02-05 RX ADMIN — DEXAMETHASONE SODIUM PHOSPHATE 4 MG: 4 INJECTION INTRA-ARTICULAR; INTRALESIONAL; INTRAMUSCULAR; INTRAVENOUS; SOFT TISSUE at 11:02

## 2025-02-05 RX ADMIN — KETOROLAC TROMETHAMINE 30 MG: 30 INJECTION, SOLUTION INTRAMUSCULAR at 11:02

## 2025-02-05 NOTE — Clinical Note
"Bossman Daigle (Willie)neville was seen and treated in our emergency department on 2/5/2025.  She may return to work on 02/07/2025.       If you have any questions or concerns, please don't hesitate to call.      SANDRA Thorne RN    "

## 2025-02-06 NOTE — ED PROVIDER NOTES
"Encounter Date: 2/5/2025    SCRIBE #1 NOTE: I, Evelyne Chiu, am scribing for, and in the presence of,  Santos Fam MD. I have scribed the following portions of the note - Other sections scribed: hpi,ros,pe,mdm.       History     Chief Complaint   Patient presents with    Leg Pain     C/O LEFT LEG PAIN X 1 WEEK WORRIED ABOUT ABOUT DVT     Bossman Kumari is a 69 y.o. female, with a PMHx of DDD and GERD, who presents to the ED with "tingling" left left pain radiating to lower back onset 1 week ago. Reports hx of similar symptoms years ago. Denies being on anticoagulants. Patient reports attempted treatment with ibuprofen approximately 5 hours ago without relief. No other exacerbating or alleviating factors. Denies any other associated symptoms.      The history is provided by the patient. No  was used.     Review of patient's allergies indicates:  No Known Allergies  Past Medical History:   Diagnosis Date    Acute cholecystitis 12/10/2015    Cholelithiasis 12/10/2015    DDD (degenerative disc disease), lumbar     Family history of diabetes mellitus type II 11/11/2015    GERD (gastroesophageal reflux disease)     Nuclear sclerosis of both eyes 6/13/2019    Unspecified asthma, with exacerbation     Urticaria     Vaginal delivery     x4     Past Surgical History:   Procedure Laterality Date    CHOLECYSTECTOMY      TOTAL ABDOMINAL HYSTERECTOMY  1981     Family History   Problem Relation Name Age of Onset    Hypertension Mother      Diabetes Mother      Cataracts Mother      Hypertension Father      Cataracts Father      No Known Problems Sister      No Known Problems Brother      No Known Problems Maternal Aunt      No Known Problems Maternal Uncle      No Known Problems Paternal Aunt      No Known Problems Paternal Uncle      No Known Problems Maternal Grandmother      No Known Problems Maternal Grandfather      No Known Problems Paternal Grandmother      No Known Problems Paternal " Grandfather      No Known Problems Other      Amblyopia Neg Hx      Blindness Neg Hx      Cancer Neg Hx      Glaucoma Neg Hx      Macular degeneration Neg Hx      Retinal detachment Neg Hx      Strabismus Neg Hx      Stroke Neg Hx      Thyroid disease Neg Hx      Melanoma Neg Hx       Social History     Tobacco Use    Smoking status: Never    Smokeless tobacco: Never   Substance Use Topics    Alcohol use: No    Drug use: No     Review of Systems   Constitutional:  Negative for fever.   HENT:  Negative for sore throat.    Respiratory:  Negative for shortness of breath.    Cardiovascular:  Negative for chest pain.   Gastrointestinal:  Negative for abdominal pain, diarrhea, nausea and vomiting.   Genitourinary:  Negative for dysuria.   Musculoskeletal:  Positive for back pain (lower) and myalgias (L leg).   Skin:  Negative for rash.   Neurological:  Negative for weakness and headaches.   Psychiatric/Behavioral:  Negative for behavioral problems.    All other systems reviewed and are negative.      Physical Exam     Initial Vitals [02/05/25 2014]   BP Pulse Resp Temp SpO2   135/88 67 18 97.9 °F (36.6 °C) 98 %      MAP       --         Physical Exam    Nursing note and vitals reviewed.  Constitutional: She appears well-developed and well-nourished.   HENT:   Head: Normocephalic and atraumatic.   Eyes: Conjunctivae are normal.   Neck: Neck supple.   Normal range of motion.  Cardiovascular:  Normal rate, regular rhythm and normal heart sounds.     Exam reveals no gallop and no friction rub.       No murmur heard.  Pulmonary/Chest: Breath sounds normal. No respiratory distress. She has no wheezes. She has no rhonchi. She has no rales.   Abdominal: Abdomen is soft. There is no abdominal tenderness.   Musculoskeletal:         General: No edema. Normal range of motion.      Cervical back: Normal range of motion and neck supple.      Comments: Lower lumbar pain upon movement radiating to left lower extremity. Bilateral lower  "extremities are neurovascularly intact.     Neurological: She is alert and oriented to person, place, and time. GCS score is 15. GCS eye subscore is 4. GCS verbal subscore is 5. GCS motor subscore is 6.   Skin: Skin is warm and dry.   Psychiatric: She has a normal mood and affect.         ED Course   Procedures  Labs Reviewed - No data to display       Imaging Results              US Lower Extremity Veins Left (Final result)  Result time 02/05/25 23:16:45      Final result by Arcelia Eugene MD (02/05/25 23:16:45)                   Impression:      No evidence of left lower extremity deep venous thrombosis.      Electronically signed by: Arcelia Eugene MD  Date:    02/05/2025  Time:    23:16               Narrative:    EXAMINATION:  US LOWER EXTREMITY VEINS LEFT    CLINICAL HISTORY:  Acute embolism and thrombosis of unspecified deep veins of unspecified lower extremity    TECHNIQUE:  Duplex and color flow Doppler evaluation of the left lower extremity veins was performed.    COMPARISON:  None    FINDINGS:  No evidence of clot involving the left common femoral, greater saphenous, femoral, popliteal, peroneal, anterior and posterior tibial veins.  All venous structures demonstrate normal respiratory phasicity and augment adequately.  No evidence of soft tissue mass or Baker's cyst.                                       Medications   ketorolac injection 30 mg (has no administration in time range)   dexAMETHasone injection 4 mg (has no administration in time range)     Medical Decision Making  Bossman Kumari is a 69 y.o. female, with a PMHx of DDD and GERD, who presents to the ED with "tingling" left left pain radiating to lower back onset 1 week ago. Reports hx of similar symptoms years ago. Denies being on anticoagulants. Patient reports attempted treatment with ibuprofen approximately 5 hours ago without relief. No other exacerbating or alleviating factors. Denies any other associated symptoms.    Course of ED " "stay:   Ultrasound of left lower extremity reveals no evidence of DVT.  Patient received Toradol/Decadron in the emergency department and was offered a prescription for gabapentin, but states "it does not work".  Instructions for sciatica were given and the patient was advised to follow with her primary care physician within the next week for re-evaluation/return to the emergency department if condition worsens.    Amount and/or Complexity of Data Reviewed  Radiology: ordered. Decision-making details documented in ED Course.    Risk  Prescription drug management.            Scribe Attestation:   Scribe #1: I performed the above scribed service and the documentation accurately describes the services I performed. I attest to the accuracy of the note.                         This document was produced by a scribe under my direction and in my presence. I agree with the content of the note and have made any necessary edits.     Dr. Fam    02/05/2025 11:34 PM        Clinical Impression:  Final diagnoses:  [M54.32] Sciatica, left side (Primary)          ED Disposition Condition    Discharge Stable          ED Prescriptions       Medication Sig Dispense Start Date End Date Auth. Provider    ibuprofen (ADVIL,MOTRIN) 800 MG tablet Take 1 tablet (800 mg total) by mouth every 8 (eight) hours as needed for Pain. 30 tablet 2/5/2025 -- Santos Fam MD          Follow-up Information       Follow up With Specialties Details Why Contact Shaun Jo Jr., MD Family Medicine Schedule an appointment as soon as possible for a visit in 1 week For reevaluation [address] [facility] [address]  [phone]               Santos Fam MD  02/05/25 2334    "

## 2025-02-07 ENCOUNTER — PATIENT OUTREACH (OUTPATIENT)
Facility: OTHER | Age: 70
End: 2025-02-07
Payer: MEDICARE

## 2025-02-07 NOTE — PROGRESS NOTES
Patient was seen in the ED on 2/6/25. Phoned patient to assist with Post ED Discharge Navigation. Patient agreed to assistance scheduling a PCP follow-up appointment within 7 days. In Basket message sent to PCP staff for scheduling assistance. Appointment scheduled. Appointment reminder set.  Nica Boudreaux

## 2025-02-12 ENCOUNTER — PATIENT OUTREACH (OUTPATIENT)
Facility: OTHER | Age: 70
End: 2025-02-12
Payer: MEDICARE

## 2025-02-12 NOTE — PROGRESS NOTES
Phoned patient for follow-up. Patient was unavailable. Message sent urging patient to call if additional assistance is needed. Appointment reminder completed via e-mail.  Nica Boudreaux

## 2025-02-24 ENCOUNTER — APPOINTMENT (OUTPATIENT)
Dept: RADIOLOGY | Facility: HOSPITAL | Age: 70
End: 2025-02-24
Attending: FAMILY MEDICINE
Payer: MEDICARE

## 2025-02-24 ENCOUNTER — OFFICE VISIT (OUTPATIENT)
Dept: FAMILY MEDICINE | Facility: CLINIC | Age: 70
End: 2025-02-24
Payer: MEDICARE

## 2025-02-24 VITALS
WEIGHT: 172.81 LBS | HEIGHT: 67 IN | TEMPERATURE: 98 F | DIASTOLIC BLOOD PRESSURE: 60 MMHG | HEART RATE: 78 BPM | SYSTOLIC BLOOD PRESSURE: 102 MMHG | RESPIRATION RATE: 19 BRPM | OXYGEN SATURATION: 99 % | BODY MASS INDEX: 27.12 KG/M2

## 2025-02-24 DIAGNOSIS — Z12.12 SCREENING FOR COLORECTAL CANCER: ICD-10-CM

## 2025-02-24 DIAGNOSIS — Z12.11 SCREENING FOR COLORECTAL CANCER: ICD-10-CM

## 2025-02-24 DIAGNOSIS — J45.20 MILD INTERMITTENT ASTHMA WITHOUT COMPLICATION: Chronic | ICD-10-CM

## 2025-02-24 DIAGNOSIS — G89.29 CHRONIC LEFT-SIDED LOW BACK PAIN WITH LEFT-SIDED SCIATICA: Chronic | ICD-10-CM

## 2025-02-24 DIAGNOSIS — Z28.21 VACCINATION DECLINED: ICD-10-CM

## 2025-02-24 DIAGNOSIS — G89.29 CHRONIC LEFT-SIDED LOW BACK PAIN WITH LEFT-SIDED SCIATICA: Primary | Chronic | ICD-10-CM

## 2025-02-24 DIAGNOSIS — M54.42 CHRONIC LEFT-SIDED LOW BACK PAIN WITH LEFT-SIDED SCIATICA: Primary | Chronic | ICD-10-CM

## 2025-02-24 DIAGNOSIS — F33.0 MILD EPISODE OF RECURRENT MAJOR DEPRESSIVE DISORDER: Chronic | ICD-10-CM

## 2025-02-24 DIAGNOSIS — M54.42 CHRONIC LEFT-SIDED LOW BACK PAIN WITH LEFT-SIDED SCIATICA: Chronic | ICD-10-CM

## 2025-02-24 DIAGNOSIS — Z12.31 ENCOUNTER FOR SCREENING MAMMOGRAM FOR MALIGNANT NEOPLASM OF BREAST: ICD-10-CM

## 2025-02-24 PROCEDURE — 72110 X-RAY EXAM L-2 SPINE 4/>VWS: CPT | Mod: 26,,, | Performed by: RADIOLOGY

## 2025-02-24 PROCEDURE — 1125F AMNT PAIN NOTED PAIN PRSNT: CPT | Mod: CPTII,S$GLB,, | Performed by: FAMILY MEDICINE

## 2025-02-24 PROCEDURE — 1101F PT FALLS ASSESS-DOCD LE1/YR: CPT | Mod: CPTII,S$GLB,, | Performed by: FAMILY MEDICINE

## 2025-02-24 PROCEDURE — 3288F FALL RISK ASSESSMENT DOCD: CPT | Mod: CPTII,S$GLB,, | Performed by: FAMILY MEDICINE

## 2025-02-24 PROCEDURE — 99999 PR PBB SHADOW E&M-EST. PATIENT-LVL IV: CPT | Mod: PBBFAC,,, | Performed by: FAMILY MEDICINE

## 2025-02-24 PROCEDURE — 1159F MED LIST DOCD IN RCRD: CPT | Mod: CPTII,S$GLB,, | Performed by: FAMILY MEDICINE

## 2025-02-24 PROCEDURE — 1160F RVW MEDS BY RX/DR IN RCRD: CPT | Mod: CPTII,S$GLB,, | Performed by: FAMILY MEDICINE

## 2025-02-24 PROCEDURE — 3008F BODY MASS INDEX DOCD: CPT | Mod: CPTII,S$GLB,, | Performed by: FAMILY MEDICINE

## 2025-02-24 PROCEDURE — 99214 OFFICE O/P EST MOD 30 MIN: CPT | Mod: S$GLB,,, | Performed by: FAMILY MEDICINE

## 2025-02-24 PROCEDURE — G2211 COMPLEX E/M VISIT ADD ON: HCPCS | Mod: S$GLB,,, | Performed by: FAMILY MEDICINE

## 2025-02-24 PROCEDURE — 3078F DIAST BP <80 MM HG: CPT | Mod: CPTII,S$GLB,, | Performed by: FAMILY MEDICINE

## 2025-02-24 PROCEDURE — 3074F SYST BP LT 130 MM HG: CPT | Mod: CPTII,S$GLB,, | Performed by: FAMILY MEDICINE

## 2025-02-24 PROCEDURE — 72110 X-RAY EXAM L-2 SPINE 4/>VWS: CPT | Mod: TC,FY,PN

## 2025-02-24 RX ORDER — PREGABALIN 50 MG/1
50 CAPSULE ORAL DAILY
Qty: 30 CAPSULE | Refills: 1 | Status: SHIPPED | OUTPATIENT
Start: 2025-02-24

## 2025-02-24 RX ORDER — ALBUTEROL SULFATE 90 UG/1
2 INHALANT RESPIRATORY (INHALATION) EVERY 6 HOURS PRN
Qty: 18 G | Refills: 11 | Status: SHIPPED | OUTPATIENT
Start: 2025-02-24 | End: 2026-02-24

## 2025-02-24 RX ORDER — DULOXETIN HYDROCHLORIDE 30 MG/1
30 CAPSULE, DELAYED RELEASE ORAL DAILY
Qty: 90 CAPSULE | Refills: 3 | Status: SHIPPED | OUTPATIENT
Start: 2025-02-24 | End: 2026-02-24

## 2025-02-24 NOTE — PATIENT INSTRUCTIONS
Make sure you are taking the duloxetine 30 mg once every day.  Stop gabapentin and start pregabalin (Lyrica) 50 mg once a day.

## 2025-02-25 ENCOUNTER — TELEPHONE (OUTPATIENT)
Dept: FAMILY MEDICINE | Facility: CLINIC | Age: 70
End: 2025-02-25
Payer: MEDICARE

## 2025-02-25 NOTE — LETTER
February 25, 2025      [facility]  [address]  [address]  [place] [address]  Phone: [phone]       Patient: Bossman Kumari   YOB: 1955  Date of Visit: 02/24/2025    To Whom It May Concern:    Sonny Kumari  accompany wife to appointment at [facility] on 02/24/2025. The patient may return to work/school on 02/25/2025 with no restrictions. If you have any questions or concerns, or if I can be of further assistance, please do not hesitate to contact me.    Sincerely,    Charisse Nogueira LPN

## 2025-02-25 NOTE — TELEPHONE ENCOUNTER
----- Message from VouchedFor sent at 2/25/2025 11:52 AM CST -----  Who called:selfWhat is the request in detail:requesting a call back for a doctors excuse for both her and her  Can the clinic reply by [facility]?noWould the patient rather a call back or a response via [facility] call back number:.[phone] Information:

## 2025-02-25 NOTE — TELEPHONE ENCOUNTER
Contacted Bossman who is requesting a note to return to school for her and her . Nurse explained that the note will be dated for yesterday 2/24/2025 and left at the .

## 2025-02-25 NOTE — LETTER
February 25, 2025      [facility]  [address]  [address]  [place] [address]  Phone: [phone]       Patient: Bossman Kumari   YOB: 1955  Date of Visit: 02/24/2025    To Whom It May Concern:    Dorie Kumari  was at [facility] on 02/24/2025. The patient may return to work/school on 02/25/2025 with no restrictions. If you have any questions or concerns, or if I can be of further assistance, please do not hesitate to contact me.    Sincerely,    Charisse Nogueira LPN

## 2025-03-16 NOTE — PROGRESS NOTES
"  Patient Name: Bossman Kumari    : 1955  MRN: [id number]      Subjective:     Patient ID: Bossman is a 69 y.o. female    Chief Complaint:  Leg Pain (Pt states that she feels an numbing sensation when she goes to bed a night)    History of Present Illness    Bossman presents today for left leg pain    She reports left leg pain radiating from buttocks down to foot when lying down, accompanied by numbness and tingling in toes. She denies recent falls and back swelling. She recently visited urgent care where she received steroid injection and ibuprofen.    She was unaware of having Cymbalta prescription and reports not taking it as prescribed. She requests Budesonide refill for asthma management.    Her last mammogram was in 2023.     She reports adverse reaction to previous flu vaccine administration, and declines vaccination      ROS:  General: -fever, -chills, -fatigue, -weight gain, -weight loss  Eyes: -vision changes, -redness, -discharge  ENT: -ear pain, -nasal congestion, -sore throat  Cardiovascular: -chest pain, -palpitations, -lower extremity edema  Respiratory: -cough, -shortness of breath  Gastrointestinal: -abdominal pain, -nausea, -vomiting, -diarrhea, -constipation, -blood in stool  Genitourinary: -dysuria, -hematuria, -frequency  Musculoskeletal: -joint pain, +muscle pain  Skin: -rash, -lesion  Neurological: -headache, -dizziness, +numbness, +tingling  Psychiatric: -anxiety, -depression, -sleep difficulty      Objective:   /60 (BP Location: Left arm, Patient Position: Sitting)   Pulse 78   Temp 98 °F (36.7 °C) (Oral)   Resp 19   Ht 5' 7" (1.702 m)   Wt 78.4 kg (172 lb 13.5 oz)   SpO2 99%   BMI 27.07 kg/m²     Physical Exam  Vitals reviewed.   Constitutional:       General: She is not in acute distress.  HENT:      Head: Normocephalic and atraumatic.      Right Ear: Ear canal and external ear normal.      Left Ear: Ear canal and external ear normal.      Nose: Nose normal. "   Eyes:      Extraocular Movements: Extraocular movements intact.      Conjunctiva/sclera: Conjunctivae normal.      Pupils: Pupils are equal, round, and reactive to light.   Cardiovascular:      Rate and Rhythm: Normal rate and regular rhythm.      Pulses: Normal pulses.      Heart sounds: Normal heart sounds.   Pulmonary:      Effort: Pulmonary effort is normal. No respiratory distress.      Breath sounds: No wheezing or rales.   Abdominal:      General: Abdomen is flat. Bowel sounds are normal. There is no distension.      Palpations: Abdomen is soft.      Tenderness: There is no abdominal tenderness. There is no guarding.   Musculoskeletal:      Cervical back: Normal range of motion. No rigidity or tenderness.   Lymphadenopathy:      Cervical: No cervical adenopathy.   Skin:     General: Skin is warm.      Capillary Refill: Capillary refill takes less than 2 seconds.   Neurological:      Mental Status: She is alert and oriented to person, place, and time.      Cranial Nerves: No cranial nerve deficit.      Sensory: No sensory deficit.   Psychiatric:         Mood and Affect: Mood normal.         Behavior: Behavior normal.        Assessment        ICD-10-CM ICD-9-CM   1. Chronic left-sided low back pain with left-sided sciatica  M54.42 724.2    G89.29 724.3     338.29   2. Mild episode of recurrent major depressive disorder  F33.0 296.31   3. Encounter for screening mammogram for malignant neoplasm of breast  Z12.31 V76.12   4. Screening for colorectal cancer  Z12.11 V76.51    Z12.12 V76.41   5. Mild intermittent asthma without complication  J45.20 493.90   6. Vaccination declined  Z28.21 V64.06         Plan:   Assessment & Plan       1. Chronic left-sided low back pain with left-sided sciatica  -     DULoxetine (CYMBALTA) 30 MG capsule; Take 1 capsule (30 mg total) by mouth once daily.  Dispense: 90 capsule; Refill: 3  -     pregabalin (LYRICA) 50 MG capsule; Take 1 capsule (50 mg total) by mouth once daily.   Dispense: 30 capsule; Refill: 1  -     X-Ray Lumbar Spine Ap Lateral w/Flex Ext; Future; Expected date: 02/24/2025  Get x-ray of the lumbar spine.    Advised patient to restart duloxetine as isn't only for depression in his also for management of chronic pain.    Advised to restart Lyrica.    Re-evaluate in four weeks.    2. Mild episode of recurrent major depressive disorder  -     DULoxetine (CYMBALTA) 30 MG capsule; Take 1 capsule (30 mg total) by mouth once daily.  Dispense: 90 capsule; Refill: 3  Advised patient to restart duloxetine.    Will re-evaluate in four weeks.      3. Encounter for screening mammogram for malignant neoplasm of breast  -     Mammo Digital Screening Bilat w/ Raman (XPD); Future; Expected date: 02/24/2025  Breast cancer screening ordered.    4. Screening for colorectal cancer  -     Fecal Immunochemical Test (iFOBT); Future; Expected date: 02/24/2025  Discussed options for colorectal cancer screening.  We discussed colonoscopy verses stool testing and risks and benefits each.   Patient elected to proceed with [facility] and patient educated on collection method.  Patient informed that if stool testing is positive will need to proceed with colonoscopy.     5. Mild intermittent asthma without complication  -     albuterol (PROAIR HFA) 90 mcg/actuation inhaler; Inhale 2 puffs into the lungs every 6 (six) hours as needed for Wheezing.  Dispense: 18 g; Refill: 11  Requested albuterol refilled.      6. Vaccination declined  Patient declines flu vaccination.               -Shaun Justin Jr., MD, AAHIVS      This note was generated with the assistance of ambient listening technology. Verbal consent was obtained by the patient and accompanying visitor(s) for the recording of patient appointment to facilitate this note. I attest to having reviewed and edited the generated note for accuracy, though some syntax or spelling errors may persist. Please contact the author of this note for any  clarification.      Patient Instructions   Make sure you are taking the duloxetine 30 mg once every day.  Stop gabapentin and start pregabalin (Lyrica) 50 mg once a day.        Follow up in about 4 weeks (around 3/24/2025) for Sciatic Pain.   Future Appointments   Date Time Provider Department Center   3/17/2025 11:30 AM [id number]   3/24/2025 11:00 AM Shaun Justin Jr., MD [facility]   3/24/2025  1:00 PM Keesha Dunne, [facility]   6/4/2025 10:00 AM Shaun Justin Jr., MD [facility]

## 2025-03-17 ENCOUNTER — HOSPITAL ENCOUNTER (OUTPATIENT)
Dept: RADIOLOGY | Facility: HOSPITAL | Age: 70
Discharge: HOME | End: 2025-03-17
Attending: FAMILY MEDICINE
Payer: MEDICARE

## 2025-03-17 DIAGNOSIS — Z12.31 ENCOUNTER FOR SCREENING MAMMOGRAM FOR MALIGNANT NEOPLASM OF BREAST: ICD-10-CM

## 2025-03-17 PROCEDURE — 77063 BREAST TOMOSYNTHESIS BI: CPT | Mod: TC

## 2025-03-17 PROCEDURE — 77063 BREAST TOMOSYNTHESIS BI: CPT | Mod: 26,,, | Performed by: RADIOLOGY

## 2025-03-17 PROCEDURE — 77067 SCR MAMMO BI INCL CAD: CPT | Mod: 26,,, | Performed by: RADIOLOGY

## 2025-03-20 ENCOUNTER — TELEPHONE (OUTPATIENT)
Dept: FAMILY MEDICINE | Facility: CLINIC | Age: 70
End: 2025-03-20
Payer: MEDICARE

## 2025-03-20 ENCOUNTER — RESULTS FOLLOW-UP (OUTPATIENT)
Dept: FAMILY MEDICINE | Facility: CLINIC | Age: 70
End: 2025-03-20

## 2025-03-20 NOTE — TELEPHONE ENCOUNTER
Patient notified of Mammogram results, verbalized understanding. Instructed to contact office with any questions or concerns.   ----- Message from Shaun Justin MD sent at 3/20/2025  8:19 AM CDT -----  Mammogram Normal  ----- Message -----  From: Pradip Rangel MD  Sent: 3/19/2025   1:37 AM CDT  To: Shaun Justin Jr., MD

## 2025-03-24 ENCOUNTER — OFFICE VISIT (OUTPATIENT)
Dept: OPTOMETRY | Facility: CLINIC | Age: 70
End: 2025-03-24
Payer: MEDICARE

## 2025-03-24 DIAGNOSIS — H26.9 CORTICAL CATARACT OF BOTH EYES: ICD-10-CM

## 2025-03-24 DIAGNOSIS — H52.7 REFRACTIVE ERROR: ICD-10-CM

## 2025-03-24 DIAGNOSIS — H25.13 NUCLEAR SCLEROSIS OF BOTH EYES: Primary | ICD-10-CM

## 2025-03-24 PROCEDURE — 92015 DETERMINE REFRACTIVE STATE: CPT | Mod: S$GLB,,, | Performed by: OPTOMETRIST

## 2025-03-24 PROCEDURE — 1126F AMNT PAIN NOTED NONE PRSNT: CPT | Mod: CPTII,S$GLB,, | Performed by: OPTOMETRIST

## 2025-03-24 PROCEDURE — 1160F RVW MEDS BY RX/DR IN RCRD: CPT | Mod: CPTII,S$GLB,, | Performed by: OPTOMETRIST

## 2025-03-24 PROCEDURE — 3288F FALL RISK ASSESSMENT DOCD: CPT | Mod: CPTII,S$GLB,, | Performed by: OPTOMETRIST

## 2025-03-24 PROCEDURE — 1101F PT FALLS ASSESS-DOCD LE1/YR: CPT | Mod: CPTII,S$GLB,, | Performed by: OPTOMETRIST

## 2025-03-24 PROCEDURE — 99214 OFFICE O/P EST MOD 30 MIN: CPT | Mod: S$GLB,,, | Performed by: OPTOMETRIST

## 2025-03-24 PROCEDURE — 1159F MED LIST DOCD IN RCRD: CPT | Mod: CPTII,S$GLB,, | Performed by: OPTOMETRIST

## 2025-03-24 PROCEDURE — 99999 PR PBB SHADOW E&M-EST. PATIENT-LVL II: CPT | Mod: PBBFAC,,, | Performed by: OPTOMETRIST

## 2025-03-24 NOTE — PROGRESS NOTES
Subjective:       Patient ID: Bossman Kumari is a 69 y.o. female      Chief Complaint   Patient presents with    Concerns About Ocular Health     History of Present Illness    Dls: 11/2/23 Dr. Casas     70 y/o female presents today with c/o blurry vision at distance and near ou  Pt wears bifocal glasses.     + dry eyes ou   No tearing   + ou itching   + ou  burning   No pain   + ha's   + ou  floaters   No flashes     Eye meds   Otc gtts ou prn     Pohx :   None     Fohx:   Cat - mother        Assessment/Plan:     1. Nuclear sclerosis of both eyes (Primary)  2. Cortical cataract of both eyes  VS OS. Visually significant cataract. Discussed diagnosis with patient and surgical process. Referral for cataract evaluation.     - Ambulatory referral/consult to Ophthalmology    3. Refractive error  Patient referred for cataract surgery, advised to hold on new glasses as prescription will change after cataract surgery.       Follow up in about 1 year (around 3/24/2026) for cataract consult.

## 2025-05-14 DIAGNOSIS — Z78.0 MENOPAUSE: ICD-10-CM

## 2025-05-19 ENCOUNTER — TELEPHONE (OUTPATIENT)
Dept: OPHTHALMOLOGY | Facility: CLINIC | Age: 70
End: 2025-05-19
Payer: MEDICARE

## 2025-05-19 NOTE — TELEPHONE ENCOUNTER
Spoke with pt about setting up cat eval that she missed this week. Pt says she was unaware of the cat eval.. Pt is now set up for 8/1/25 [facility] for 8:15 AM

## 2025-05-19 NOTE — TELEPHONE ENCOUNTER
----- Message from Stephan sent at 5/19/2025  8:53 AM CDT -----  Regarding: Bossman  Type: Patient Callback Who called: Bossman What is the request in detail: Patient had an appointment for a Cataract Eval that she was not aware of and she missed the appointment. The patient would like for someone in the office to give her a call about this as soon as possible. Please reach out to the patient. Can the clinic reply by [facility]? Yes Would the patient rather a call back or a response via [facility]? [facility] call back number: [phone] Information:

## 2025-05-26 DIAGNOSIS — Z00.00 ENCOUNTER FOR MEDICARE ANNUAL WELLNESS EXAM: ICD-10-CM

## 2025-05-30 ENCOUNTER — TELEPHONE (OUTPATIENT)
Dept: ENDOSCOPY | Facility: HOSPITAL | Age: 70
End: 2025-05-30

## 2025-05-30 ENCOUNTER — CLINICAL SUPPORT (OUTPATIENT)
Dept: ENDOSCOPY | Facility: HOSPITAL | Age: 70
End: 2025-05-30
Attending: FAMILY MEDICINE
Payer: MEDICARE

## 2025-05-30 VITALS — WEIGHT: 150 LBS | BODY MASS INDEX: 23.54 KG/M2 | HEIGHT: 67 IN

## 2025-05-30 DIAGNOSIS — Z12.11 SCREENING FOR COLORECTAL CANCER: ICD-10-CM

## 2025-05-30 DIAGNOSIS — Z12.12 SCREENING FOR COLORECTAL CANCER: ICD-10-CM

## 2025-05-30 DIAGNOSIS — Z12.11 SPECIAL SCREENING FOR MALIGNANT NEOPLASMS, COLON: Primary | ICD-10-CM

## 2025-05-30 NOTE — PLAN OF CARE
Patient is scheduled for a Colonoscopy on 07/12/2025 with Dr. ROBYN Laura  Referral for procedure from PAT appointment

## 2025-05-30 NOTE — PATIENT INSTRUCTIONS
Colonoscopy Procedure Prep Instructions    Date of procedure: 07/12/2025 Arrive at: 08:45 AM    Location of Department:   [facility] [place] [address]  Take the Elevators to 2nd Floor Endoscopy Procedural Area    As soon as possible:   your prep from pharmacy and over the counter DULCOLAX LAXATIVE TABLETS            On the day before your procedure   What You CAN do:   You may have clear liquids ONLY-see below for list.     Liquids That Are OK to Drink:   Water  Sports drinks (Gatorade, Power-Aid)  Coffee or tea (no cream or nondairy creamer)  Clear juices without pulp (apple, white grape)  Gelatin desserts (no fruit or toppings)  Clear soda (sprite, coke, ginger ale)  Chicken broth (until 12 midnight the night before procedure)    What You CANNOT do:   Do not EAT solid food, drink milk or anything   colored red.  Do not drink alcohol.  Do not take oral medications within 1 hour of starting   each dose of prep.  No gum chewing or candy morning of procedure                       Note:   (Please disregard the insert instructions from pharmacy).  PEG Bowel Prep is indicated for cleansing of the colon as a preparation for colonoscopy in adults.   Be sure to tell your doctor about all the medicines you take, including prescription and non-prescription medicines, vitamins, and herbal supplements. PEG Bowel Prep may affect how other medicines work.  Medication taken by mouth may not be absorbed properly when taken within 1 hour before the start of each dose of PEG Bowel Prep.    It is not uncommon to experience some abdominal cramping, nausea and/or vomiting when taking the prep. If you have nausea and/or vomiting while taking the prep, stop drinking for 20 to 30 minutes then continue.      How to take prep:    PEG Bowel Prep is a (2-day) prep.    One (1) bottle of prep are required for a complete preparation for colonoscopy. Dilute the solution concentrate as directed prior  to use. You must drink water with each dose of prep, and additional water after each dose.    DOSE 1--Day Before Colonoscopy 07/11/2025     Drink at least 6 to 8 glasses of clear liquids from time you wake up until you begin your prep and then continue until bedtime to avoid dehydration.     12:00 pm (NOON) Mix your entire container of prep with lukewarm water and refrigerate. Take four (4) Dulcolax (Bisacodyl) tablets with at least 8 ounces or more of clear liquids.       6:00 pm:    You must complete Steps 1 and 2 below before going to bed:    Step 1-Drink half the liquid in the container within one (1) hour.   Step 2-Refrigerate the remaining half of the liquid for dose 2. See below when to begin this step.                       IMPORTANT: If you experience preparation-related symptoms (for example, nausea, bloating, or cramping), stop, or slow the rate of drinking the additional water until your symptoms decrease.    DOSE 2--Day of the Colonoscopy 07/12/2025 at 2-3 AM.    For this dose, repeat Step 1 shown above using the remaining half of the liquid prep.   You may continue drinking water/clear liquids until   4 hours before your colonoscopy or as directed by the scheduling nurse  05:45 AM.      For information about your procedure, two (2) things to view prior to colonoscopy:  Please watch this informational video. It is important to watch this animated consent video prior to your arrival. If you haven't watched the video prior to arriving, you are required to watch it during admission which can causes delays.    Options for viewing:   Using a keyboard:  press and hold the control tab (Ctrl) and left mouse click to follow links.           Colonoscopy Instructional Video                                                                                   OR    Type link address into your web browser's address bar:  [email or web address]      Educational Booklet with  pictures:      Colonoscopy Prep - Liquid      Comments:        IMPORTANT INFORMATION TO KNOW BEFORE YOUR PROCEDURE    [facility] 2nd Floor       When you arrive:  If your procedure is Monday - Friday 5am - 7pm - Please enter through the front door near [facility]. Please proceed up the first set of elevators to the 2nd floor where you will check in at the endo registration desk.     If your procedure is on a Saturday (weekend), enter through the back Outpatient entrance. Please note this entrance is diagonal from the Emergency Department entrance.              If your procedure requires the administration of anesthesia, it is necessary for a responsible adult to drive you home. (Medical Transportation, Uber, Lyft, Taxi, etc. may ONLY be used if a responsible adult is present to accompany you home.  The responsible adult CAN'T be the  of the service).      person must be available to return to pick you up within 15 minutes of being notified of discharge.       Please bring a picture ID, insurance card, & copayment      Take Medications as directed below:        If you begin taking any blood thinning medications, injectable weight loss/diabetes medications (other than insulin) , Adipex (Phentermine) , please contact the endoscopy scheduling department listed below as soon as possible.    If you are diabetic see the attached instruction sheet regarding your medication.     If you take HEART, BLOOD PRESSURE, SEIZURE, PAIN, LUNG (including inhalers/nebulizers), ANTI-REJECTION (transplant patients), or PSYCHIATRIC medications, please take at your regular times with a sip of water or as directed by the scheduling nurse.     Important contact information:    Endoscopy [phone] Hours of operation Monday-Friday 8:00-4:30pm.    Questions about insurance or financial obligations call [phone] or [phone].    If you have questions regarding the prep or need  to reschedule, please call [phone]. After hours questions requiring immediate assistance, contact [facility] nurse line at [phone] or [phone].   NOTE:     On occasion, unforeseen circumstances may cause a delay in your procedure start time. We respect your time and appreciate your patience during these circumstances.      Comments:

## 2025-07-11 ENCOUNTER — ANESTHESIA EVENT (OUTPATIENT)
Dept: ENDOSCOPY | Facility: HOSPITAL | Age: 70
End: 2025-07-11
Payer: MEDICARE

## 2025-07-11 NOTE — ANESTHESIA PREPROCEDURE EVALUATION
07/11/2025  Bossman Kumari is a 69 y.o., female.  To undergo Procedure(s) (LRB):  COLONOSCOPY, SCREENING, LOW RISK PATIENT (N/A)     Denies CP/SOB/GERD/MI/CVA/URI symptoms.  METS > 4  NPO Adequate    Past Medical History:  Past Medical History:   Diagnosis Date    Acute cholecystitis 12/10/2015    Cholelithiasis 12/10/2015    DDD (degenerative disc disease), lumbar     Family history of diabetes mellitus type II 11/11/2015    GERD (gastroesophageal reflux disease)     Nuclear sclerosis of both eyes 6/13/2019    Unspecified asthma, with exacerbation     Urticaria     Vaginal delivery     x4       Past Surgical History:  Past Surgical History:   Procedure Laterality Date    CHOLECYSTECTOMY      TOTAL ABDOMINAL HYSTERECTOMY  1981       Social History:  Social History[1]    Medications:  Medications Ordered Prior to Encounter[2]    Allergies:  Review of patient's allergies indicates:  No Known Allergies    Active Problems:  Problem List[3]    Diagnostic Studies:   Latest Reference Range & Units 12/04/24 11:47   WBC 3.90 - 12.70 K/uL 6.38   RBC 4.00 - 5.40 M/uL 4.28   Hemoglobin 12.0 - 16.0 g/dL 12.4   Hematocrit 37.0 - 48.5 % 40.9   MCV 82 - 98 fL 96   MCH 27.0 - 31.0 pg 29.0   MCHC 32.0 - 36.0 g/dL 30.3 (L)   RDW 11.5 - 14.5 % 13.3   Platelet Count 150 - 450 K/uL 310      Latest Reference Range & Units 12/04/24 11:47   Sodium 136 - 145 mmol/L 143   Potassium 3.5 - 5.1 mmol/L 4.2   Chloride 95 - 110 mmol/L 109   CO2 23 - 29 mmol/L 24   Anion Gap 8 - 16 mmol/L 10   BUN 8 - 23 mg/dL 11   Creatinine 0.5 - 1.4 mg/dL 0.9   eGFR >60 mL/min/1.73 m^2 >60     24 Hour Vitals:      See Nursing Charting For Additional Vitals      Pre-op Assessment    I have reviewed the Patient Summary Reports.     I have reviewed the Nursing Notes.       Review of Systems  Anesthesia Hx:  No problems with previous Anesthesia                Denies Personal Hx of Anesthesia complications.                    Social:  Non-Smoker, No Alcohol Use       Cardiovascular:  Cardiovascular Normal Exercise tolerance: good                                             Pulmonary:    Asthma                    Hepatic/GI:  Hepatic/GI Normal                    Musculoskeletal:  Arthritis          Spine Disorders: lumbar Disc disease and Degenerative disease           Neurological:  Neurology Normal                                      Endocrine:  Endocrine Normal                Physical Exam  General: Well nourished and Cooperative    Airway:  Mallampati: II   Mouth Opening: Normal  TM Distance: Normal    Dental:  Dentures    Chest/Lungs:  Clear to auscultation, Normal Respiratory Rate    Heart:  Rate: Normal  Rhythm: Regular Rhythm        Anesthesia Plan  Type of Anesthesia, risks & benefits discussed:    Anesthesia Type: Gen ETT, MAC, Gen Natural Airway  Intra-op Monitoring Plan: Standard ASA Monitors  Post Op Pain Control Plan: multimodal analgesia  Induction:  IV  Informed Consent: Informed consent signed with the Patient and all parties understand the risks and agree with anesthesia plan.  All questions answered.   ASA Score: 2    Ready For Surgery From Anesthesia Perspective.     .           [1]   Social History  Socioeconomic History    Marital status:    Tobacco Use    Smoking status: Never    Smokeless tobacco: Never   Substance and Sexual Activity    Alcohol use: No    Drug use: No    Sexual activity: Yes     Partners: Male     Social Drivers of Health     Financial Resource Strain: Medium Risk (5/2/2023)    Overall Financial Resource Strain (CARDIA)     Difficulty of Paying Living Expenses: Somewhat hard   Food Insecurity: Food Insecurity Present (5/2/2023)    Hunger Vital Sign     Worried About Running Out of Food in the Last Year: Often true     Ran Out of Food in the Last Year: Never true   Transportation Needs: Unmet Transportation Needs (5/2/2023)     PRAPARE - Transportation     Lack of Transportation (Medical): Yes     Lack of Transportation (Non-Medical): Yes   Physical Activity: Sufficiently Active (5/2/2023)    Exercise Vital Sign     Days of Exercise per Week: 7 days     Minutes of Exercise per Session: 30 min   Stress: Stress Concern Present (5/2/2023)    [employer] - Occupational Stress Questionnaire     Feeling of Stress : To some extent   Housing Stability: Low Risk  (5/2/2023)    Housing Stability Vital Sign     Unable to Pay for Housing in the Last Year: No     Number of Places Lived in the Last Year: 1     Unstable Housing in the Last Year: No   [2]   No current facility-administered medications on file prior to encounter.     Current Outpatient Medications on File Prior to Encounter   Medication Sig Dispense Refill    albuterol (PROAIR HFA) 90 mcg/actuation inhaler Inhale 2 puffs into the lungs every 6 (six) hours as needed for Wheezing. 18 g 11    atorvastatin (LIPITOR) 10 MG tablet Take 1 tablet (10 mg total) by mouth once daily. 90 tablet 3    DULoxetine (CYMBALTA) 30 MG capsule Take 1 capsule (30 mg total) by mouth once daily. 90 capsule 3    ibuprofen (ADVIL,MOTRIN) 800 MG tablet Take 1 tablet (800 mg total) by mouth every 8 (eight) hours as needed for Pain. 30 tablet 0    levocetirizine (XYZAL) 5 MG tablet Take 1 tablet (5 mg total) by mouth every evening. 90 tablet 3    mirtazapine (REMERON) 7.5 MG Tab Take 1 tablet (7.5 mg total) by mouth once daily. Take an hour before bedtime 90 tablet 3    pregabalin (LYRICA) 50 MG capsule Take 1 capsule (50 mg total) by mouth once daily. 30 capsule 1   [3]   Patient Active Problem List  Diagnosis    DDD (degenerative disc disease), lumbar    GERD (gastroesophageal reflux disease)    Mild intermittent asthma without complication    Osteoarthritis of hip    Mild episode of recurrent major depressive disorder    Plantar fasciitis    Fatty liver    Pes planus of right foot     Hammertoe    Primary osteoarthritis of both knees    Osteoarthritis of ankle    Numbness of toes    Refractive error    Nuclear sclerosis of both eyes    Ptosis, left eyelid    Aortic atherosclerosis    Primary osteoarthritis of both wrists    Paresthesia of left leg    Pain in both hands    Dry eye syndrome, bilateral    Chronic left-sided low back pain with left-sided sciatica    Psychophysiological insomnia    Sciatica of right side    Sciatica, left side    Cortical cataract of both eyes

## 2025-07-12 ENCOUNTER — ANESTHESIA (OUTPATIENT)
Dept: ENDOSCOPY | Facility: HOSPITAL | Age: 70
End: 2025-07-12
Payer: MEDICARE

## 2025-07-12 ENCOUNTER — HOSPITAL ENCOUNTER (OUTPATIENT)
Facility: HOSPITAL | Age: 70
Discharge: HOME | End: 2025-07-12
Attending: INTERNAL MEDICINE | Admitting: INTERNAL MEDICINE
Payer: MEDICARE

## 2025-07-12 VITALS
OXYGEN SATURATION: 100 % | TEMPERATURE: 98 F | SYSTOLIC BLOOD PRESSURE: 109 MMHG | HEART RATE: 63 BPM | RESPIRATION RATE: 18 BRPM | DIASTOLIC BLOOD PRESSURE: 72 MMHG

## 2025-07-12 DIAGNOSIS — Z12.11 COLON CANCER SCREENING: Primary | ICD-10-CM

## 2025-07-12 PROCEDURE — G0105 COLORECTAL SCRN; HI RISK IND: HCPCS | Mod: 53,,, | Performed by: INTERNAL MEDICINE

## 2025-07-12 PROCEDURE — 63600175 PHARM REV CODE 636 W HCPCS: Performed by: STUDENT IN AN ORGANIZED HEALTH CARE EDUCATION/TRAINING PROGRAM

## 2025-07-12 PROCEDURE — 37000009 HC ANESTHESIA EA ADD 15 MINS: Performed by: INTERNAL MEDICINE

## 2025-07-12 PROCEDURE — 25000003 PHARM REV CODE 250: Performed by: STUDENT IN AN ORGANIZED HEALTH CARE EDUCATION/TRAINING PROGRAM

## 2025-07-12 PROCEDURE — 37000008 HC ANESTHESIA 1ST 15 MINUTES: Performed by: INTERNAL MEDICINE

## 2025-07-12 PROCEDURE — G0105 COLORECTAL SCRN; HI RISK IND: HCPCS | Performed by: INTERNAL MEDICINE

## 2025-07-12 RX ORDER — LIDOCAINE HYDROCHLORIDE 10 MG/ML
1 INJECTION, SOLUTION EPIDURAL; INFILTRATION; INTRACAUDAL; PERINEURAL ONCE
Status: DISCONTINUED | OUTPATIENT
Start: 2025-07-12 | End: 2025-07-12 | Stop reason: HOSPADM

## 2025-07-12 RX ORDER — LIDOCAINE HYDROCHLORIDE 20 MG/ML
INJECTION INTRAVENOUS
Status: DISCONTINUED | OUTPATIENT
Start: 2025-07-12 | End: 2025-07-12

## 2025-07-12 RX ORDER — SODIUM CHLORIDE 9 MG/ML
INJECTION, SOLUTION INTRAVENOUS CONTINUOUS
Status: DISCONTINUED | OUTPATIENT
Start: 2025-07-12 | End: 2025-07-12 | Stop reason: HOSPADM

## 2025-07-12 RX ORDER — PROPOFOL 10 MG/ML
VIAL (ML) INTRAVENOUS
Status: DISCONTINUED
Start: 2025-07-12 | End: 2025-07-12 | Stop reason: HOSPADM

## 2025-07-12 RX ORDER — PROPOFOL 10 MG/ML
VIAL (ML) INTRAVENOUS
Status: DISCONTINUED | OUTPATIENT
Start: 2025-07-12 | End: 2025-07-12

## 2025-07-12 RX ORDER — LIDOCAINE HYDROCHLORIDE 20 MG/ML
INJECTION, SOLUTION EPIDURAL; INFILTRATION; INTRACAUDAL; PERINEURAL
Status: DISCONTINUED
Start: 2025-07-12 | End: 2025-07-12 | Stop reason: HOSPADM

## 2025-07-12 RX ADMIN — PROPOFOL 70 MG: 10 INJECTION, EMULSION INTRAVENOUS at 09:07

## 2025-07-12 RX ADMIN — LIDOCAINE HYDROCHLORIDE 100 MG: 20 INJECTION, SOLUTION INTRAVENOUS at 09:07

## 2025-07-12 RX ADMIN — PROPOFOL 20 MG: 10 INJECTION, EMULSION INTRAVENOUS at 09:07

## 2025-07-12 RX ADMIN — SODIUM CHLORIDE: 0.9 INJECTION, SOLUTION INTRAVENOUS at 09:07

## 2025-07-12 NOTE — PLAN OF CARE
Procedure and recovery complete. Awake and alert. Vss, denies complain of pain. Dr. Laura discussed procedue findings with patient. Ambulates without assistance, gait steady. Discharge criteria meet.

## 2025-07-12 NOTE — TRANSFER OF CARE
Anesthesia Transfer of Care Note    Patient: Bossman Kumari    Procedure(s) Performed: Procedure(s) (LRB):  COLONOSCOPY, SCREENING, LOW RISK PATIENT (N/A)    Patient location: GI    Anesthesia Type: general    Transport from OR: Transported from OR on room air with adequate spontaneous ventilation    Post pain: adequate analgesia    Post assessment: no apparent anesthetic complications and tolerated procedure well    Post vital signs: stable    Level of consciousness: sedated    Nausea/Vomiting: no nausea/vomiting    Complications: none    Transfer of care protocol was followed      Last vitals: Visit Vitals  BP (!) 100/58 (BP Location: Left arm, Patient Position: Lying)   Pulse 68   Temp 36.4 °C (97.6 °F) (Oral)   Resp 16   SpO2 99%   Breastfeeding No

## 2025-07-12 NOTE — PROVATION PATIENT INSTRUCTIONS
Discharge Summary/Instructions after an Endoscopic Procedure  Patient Name: Bossman Kumari  Patient MRN: [id number]  Patient YOB: 1955 Saturday, July 12, 2025  Elizabeth Laura MD  Dear patient,  As a result of recent federal legislation (The Federal Cures Act), you may   receive lab or pathology results from your procedure in your [facility]   account before your physician is able to contact you. Your physician or   their representative will relay the results to you with their   recommendations at their soonest availability.  Thank you,  RESTRICTIONS:  During your procedure today, you received medications for sedation.  These   medications may affect your judgment, balance and coordination.  Therefore,   for 24 hours, you have the following restrictions:   - DO NOT drive a car, operate machinery, make legal/financial decisions,   sign important papers or drink alcohol.    ACTIVITY:  Today: no heavy lifting, straining or running due to procedural   sedation/anesthesia.  The following day: return to full activity including work.  DIET:  Eat and drink normally unless instructed otherwise.     TREATMENT FOR COMMON SIDE EFFECTS:  - Mild abdominal pain, nausea, belching, bloating or excessive gas:  rest,   eat lightly and use a heating pad.  - Sore Throat: treat with throat lozenges and/or gargle with warm salt   water.  - Because air was used during the procedure, expelling large amounts of air   from your rectum or belching is normal.  - If a bowel prep was taken, you may not have a bowel movement for 1-3 days.    This is normal.  SYMPTOMS TO WATCH FOR AND REPORT TO YOUR PHYSICIAN:  1. Abdominal pain or bloating, other than gas cramps.  2. Chest pain.  3. Back pain.  4. Signs of infection such as: chills or fever occurring within 24 hours   after the procedure.  5. Rectal bleeding, which would show as bright red, maroon, or black stools.   (A tablespoon of blood from the rectum is not serious, especially  if   hemorrhoids are present.)  6. Vomiting.  7. Weakness or dizziness.  GO DIRECTLY TO THE NEAREST EMERGENCY ROOM IF YOU HAVE ANY OF THE FOLLOWING:      Difficulty breathing              Chills and/or fever over 101 F   Persistent vomiting and/or vomiting blood   Severe abdominal pain   Severe chest pain   Black, tarry stools   Bleeding- more than one tablespoon   Any other symptom or condition that you feel may need urgent attention  Your doctor recommends these additional instructions:  If any biopsies were taken, your doctors clinic will contact you in 1 to 2   weeks with any results.  - Discharge patient to home.   - Resume previous diet.   - Continue present medications.   - Repeat colonoscopy at appointment to be scheduled because the bowel   preparation was poor.  For questions, problems or results please call your physician - Elizaebth Laura MD at Work:  [phone].  [facility] can be reached at [phone]     IF A COMPLICATION OR EMERGENCY SITUATION ARISES AND YOU ARE UNABLE TO REACH   YOUR PHYSICIAN - GO DIRECTLY TO THE EMERGENCY ROOM.  Elizabeth Laura MD  7/12/2025 10:01:51 AM  This report has been verified and signed electronically.  Dear patient,  As a result of recent federal legislation (The Federal Cures Act), you may   receive lab or pathology results from your procedure in your [facility]   account before your physician is able to contact you. Your physician or   their representative will relay the results to you with their   recommendations at their soonest availability.  Thank you,  PROVATION

## 2025-07-12 NOTE — ANESTHESIA POSTPROCEDURE EVALUATION
Anesthesia Post Evaluation    Patient: Bossman Kumari    Procedure(s) Performed: Procedure(s) (LRB):  COLONOSCOPY, SCREENING, LOW RISK PATIENT (N/A)    Final Anesthesia Type: general      Patient location during evaluation: GI PACU  Patient participation: Yes- Able to Participate  Level of consciousness: awake and alert and oriented  Post-procedure vital signs: reviewed and stable  Pain management: adequate  Airway patency: patent    PONV status at discharge: No PONV  Anesthetic complications: no      Cardiovascular status: hemodynamically stable and blood pressure returned to baseline  Respiratory status: spontaneous ventilation, room air and unassisted  Hydration status: euvolemic  Follow-up not needed.              Vitals Value Taken Time   /72 07/12/25 10:25   Temp 36.4 °C (97.6 °F) 07/12/25 09:59   Pulse 63 07/12/25 10:25   Resp 18 07/12/25 10:25   SpO2 100 % 07/12/25 10:25         Event Time   Out of Recovery 10:36:30         Pain/Ash Score: Ash Score: 10 (7/12/2025  9:59 AM)

## 2025-07-12 NOTE — H&P
Short Stay Endoscopy History and Physical    PCP - Shaun Justin Jr., MD    Procedure - Colonoscopy  ASA - per anesthesia  Mallampati - per anesthesia  History of Anesthesia problems - no  Family history Anesthesia problems - no   Plan of anesthesia - General    HPI:  This is a 69 y.o. female here for evaluation of :family history of colon cancer in her sister      ROS:  Constitutional: No fevers, chills, No weight loss  CV: No chest pain  Pulm: No cough, No shortness of breath  GI: see HPI  Derm: No rash    Medical History:  has a past medical history of Acute cholecystitis (12/10/2015), Cholelithiasis (12/10/2015), DDD (degenerative disc disease), lumbar, Family history of diabetes mellitus type II (11/11/2015), GERD (gastroesophageal reflux disease), Nuclear sclerosis of both eyes (6/13/2019), Unspecified asthma, with exacerbation, Urticaria, and Vaginal delivery.    Surgical History:  has a past surgical history that includes Cholecystectomy and Total abdominal hysterectomy (1981).    Family History: family history includes Cataracts in her father and mother; Diabetes in her mother; Hypertension in her father and mother; No Known Problems in her brother, maternal aunt, maternal grandfather, maternal grandmother, maternal uncle, paternal aunt, paternal grandfather, paternal grandmother, paternal uncle, sister, and another family member.. Otherwise no colon cancer, inflammatory bowel disease, or GI malignancies.    Social History:  reports that she has never smoked. She has never used smokeless tobacco. She reports that she does not drink alcohol and does not use drugs.    Review of patient's allergies indicates:  No Known Allergies    Medications:   Prescriptions Prior to Admission[1]      Physical Exam:    Vital Signs: There were no vitals filed for this visit.    Gen: NAD, lying comfortably  HENT: NCAT, oropharynx clear  Eyes: anicteric sclerae, EOMI grossly  Neck: supple, no visible  masses/goiter  Cardiac: RRR  Lungs: non-labored breathing  Abd: soft, NT/ND, normoactive BS  Ext: no LE edema, warm, well perfused  Skin: skin intact on exposed body parts, no visible rashes, lesions  Neuro: A&Ox4, neuro exam grossly intact, moves all extremities  Psych: appropriate mood, affect        Labs:  Lab Results   Component Value Date    WBC 6.38 12/04/2024    HGB 12.4 12/04/2024    HCT 40.9 12/04/2024     12/04/2024    CHOL 118 (L) 12/04/2024    TRIG 56 12/04/2024    HDL 35 (L) 12/04/2024    ALT 16 12/04/2024    AST 23 12/04/2024     12/04/2024    K 4.2 12/04/2024     12/04/2024    CREATININE 0.9 12/04/2024    BUN 11 12/04/2024    CO2 24 12/04/2024    TSH 0.917 03/02/2021    HGBA1C 5.2 12/04/2024       Plan:  Colonoscopy for a family history of colon cancer in her sister.    I have explained the risks and benefits of endoscopy procedures to the patient including but not limited to bleeding, perforation, infection, and death.  The patient was asked if they understand and allowed to ask any further questions to their satisfaction.    Elizabeth Laura MD         [1]   Medications Prior to Admission   Medication Sig Dispense Refill Last Dose/Taking    albuterol (PROAIR HFA) 90 mcg/actuation inhaler Inhale 2 puffs into the lungs every 6 (six) hours as needed for Wheezing. 18 g 11     atorvastatin (LIPITOR) 10 MG tablet Take 1 tablet (10 mg total) by mouth once daily. 90 tablet 3     DULoxetine (CYMBALTA) 30 MG capsule Take 1 capsule (30 mg total) by mouth once daily. 90 capsule 3     ibuprofen (ADVIL,MOTRIN) 800 MG tablet Take 1 tablet (800 mg total) by mouth every 8 (eight) hours as needed for Pain. 30 tablet 0     levocetirizine (XYZAL) 5 MG tablet Take 1 tablet (5 mg total) by mouth every evening. 90 tablet 3     mirtazapine (REMERON) 7.5 MG Tab Take 1 tablet (7.5 mg total) by mouth once daily. Take an hour before bedtime 90 tablet 3     pregabalin (LYRICA) 50 MG capsule Take 1 capsule (50  mg total) by mouth once daily. 30 capsule 1

## 2025-07-30 ENCOUNTER — TELEPHONE (OUTPATIENT)
Dept: ENDOSCOPY | Facility: HOSPITAL | Age: 70
End: 2025-07-30
Payer: MEDICARE

## 2025-07-30 NOTE — TELEPHONE ENCOUNTER
"Contacted the patient x2 to schedule an endoscopy procedure(s) Colonoscopy . The patient did not answer the call and left a voice message requesting a call back.       ----- Message -----   From: Elizabeth Laura MD   Sent: 7/12/2025   9:56 AM CDT   To: [facility]   Subject: Repeat colonoscopy with extended prep            Procedure: Colonoscopy     Diagnosis: family history of colon cancer     Procedure Timing: Within 12 weeks     *If within 4 weeks selected, please sindy as high priority*     *If greater than 12 weeks, please select "5-12 weeks" and delay sending until 3 months prior to requested date*     Location: Hospital Based ([address], [facility], [facility])     Additional Scheduling Information: No scheduling concerns     Prep Specifications:Extended/Constipation prep     Is the patient taking a GLP-1 Agonist:no     Have you attached a patient to this message: yes   "

## 2025-08-06 ENCOUNTER — TELEPHONE (OUTPATIENT)
Dept: GASTROENTEROLOGY | Facility: CLINIC | Age: 70
End: 2025-08-06
Payer: MEDICARE

## 2025-08-06 ENCOUNTER — TELEPHONE (OUTPATIENT)
Dept: ENDOSCOPY | Facility: HOSPITAL | Age: 70
End: 2025-08-06
Payer: MEDICARE

## 2025-08-06 NOTE — TELEPHONE ENCOUNTER
"----- Message from Jamila sent at 7/14/2025  8:13 AM CDT -----  Regarding: FW: Repeat colonoscopy with extended prep    ----- Message -----  From: Elizabeth Laura MD  Sent: 7/12/2025   9:56 AM CDT  To: [facility]  Subject: Repeat colonoscopy with extended prep            Procedure: Colonoscopy    Diagnosis: family history of colon cancer    Procedure Timing: Within 12 weeks    #If within 4 weeks selected, please sindy as high priority#    #If greater than 12 weeks, please select "5-12 weeks" and delay sending until 3 months prior to requested date#     Location: Hospital Based ([address],  [place], [facility])    Additional Scheduling Information: No scheduling concerns    Prep Specifications:Extended/Constipation prep    Is the patient taking a GLP-1 Agonist:no    Have you attached a patient to this message: yes  "

## 2025-08-06 NOTE — TELEPHONE ENCOUNTER
Dear Referring Provider and Staff,    The Endoscopy Scheduling Department has made 2+ attempts to reach the patient for scheduling their Colonoscopy. All final attempts have been made for this referral, if the referring provider would like the patient to receive endoscopic care, please confirm with the patient whether they would like to proceed. If so, then submit a new referral and inform the Pt that the Endoscopy Scheduling department will be contacting them to schedule their procedure.      Thank you,    Endoscopy Scheduling Department        Contacted the patient x2 to schedule an endoscopy procedure(s) Colonoscopy . The patient did not answer the call and was not able to leave a voicemail      Letter mailed to pt          Endoscopy Scheduling Department  [facility] [address], [place] [address]  Take the Elevators to 2nd Floor Endoscopy Procedural Area      Date: 8/6/25      Medical Record # [id number]        Dear  Bossman Kumari      An order for the following procedure(s) Colonoscopy was placed for you by   Dr. Laura.    Since multiple attempts have been made to get in touch with you, this is the last notification. Please call the scheduling nurse to schedule this procedure as soon as possible.    If you have already scheduled this appointment, please disregard this letter. If you would like to cancel this request, please call the number listed below.     Sincerely,        Endoscopy Scheduling Department [phone]        Comments: Office hours are Monday through Friday 8-430p.

## 2025-08-06 NOTE — TELEPHONE ENCOUNTER
MA left a vm for patient to call endoscopy or give our office a call back to get procedure scheduled.

## 2025-08-07 NOTE — TELEPHONE ENCOUNTER
MA left another vm for the patient to contact our office or Endoscopy to schedule procedure. Patient was given both numbers as well.